# Patient Record
Sex: MALE | Race: WHITE | Employment: OTHER | URBAN - METROPOLITAN AREA
[De-identification: names, ages, dates, MRNs, and addresses within clinical notes are randomized per-mention and may not be internally consistent; named-entity substitution may affect disease eponyms.]

---

## 2023-04-10 LAB
ANION GAP IN SER/PLAS: 12 MMOL/L (ref 10–20)
CALCIUM (MG/DL) IN SER/PLAS: 8.5 MG/DL (ref 8.6–10.3)
CARBON DIOXIDE, TOTAL (MMOL/L) IN SER/PLAS: 28 MMOL/L (ref 21–32)
CHLORIDE (MMOL/L) IN SER/PLAS: 104 MMOL/L (ref 98–107)
CHOLESTEROL (MG/DL) IN SER/PLAS: 129 MG/DL (ref 0–199)
CHOLESTEROL IN HDL (MG/DL) IN SER/PLAS: 60.6 MG/DL
CHOLESTEROL/HDL RATIO: 2.1
CREATININE (MG/DL) IN SER/PLAS: 0.91 MG/DL (ref 0.5–1.3)
GFR MALE: 85 ML/MIN/1.73M2
GLUCOSE (MG/DL) IN SER/PLAS: 63 MG/DL (ref 74–99)
LDL: 57 MG/DL (ref 0–99)
POTASSIUM (MMOL/L) IN SER/PLAS: 4.5 MMOL/L (ref 3.5–5.3)
SODIUM (MMOL/L) IN SER/PLAS: 139 MMOL/L (ref 136–145)
THYROTROPIN (MIU/L) IN SER/PLAS BY DETECTION LIMIT <= 0.05 MIU/L: 7.02 MIU/L (ref 0.44–3.98)
THYROXINE (T4) FREE (NG/DL) IN SER/PLAS: 0.66 NG/DL (ref 0.61–1.12)
TRIGLYCERIDE (MG/DL) IN SER/PLAS: 58 MG/DL (ref 0–149)
UREA NITROGEN (MG/DL) IN SER/PLAS: 25 MG/DL (ref 6–23)
VLDL: 12 MG/DL (ref 0–40)

## 2023-04-12 ENCOUNTER — APPOINTMENT (OUTPATIENT)
Dept: PRIMARY CARE | Facility: CLINIC | Age: 78
End: 2023-04-12
Payer: MEDICARE

## 2023-04-13 ENCOUNTER — OFFICE VISIT (OUTPATIENT)
Dept: PRIMARY CARE | Facility: CLINIC | Age: 78
End: 2023-04-13
Payer: MEDICARE

## 2023-04-13 VITALS
HEART RATE: 56 BPM | DIASTOLIC BLOOD PRESSURE: 70 MMHG | TEMPERATURE: 97.7 F | SYSTOLIC BLOOD PRESSURE: 124 MMHG | OXYGEN SATURATION: 96 %

## 2023-04-13 DIAGNOSIS — E78.2 MIXED HYPERLIPIDEMIA: ICD-10-CM

## 2023-04-13 DIAGNOSIS — I10 ESSENTIAL HYPERTENSION: ICD-10-CM

## 2023-04-13 DIAGNOSIS — R32 URINARY INCONTINENCE, UNSPECIFIED TYPE: ICD-10-CM

## 2023-04-13 DIAGNOSIS — E55.9 VITAMIN D DEFICIENCY: ICD-10-CM

## 2023-04-13 DIAGNOSIS — R79.9 ELEVATED BLOOD UREA NITROGEN: ICD-10-CM

## 2023-04-13 DIAGNOSIS — E03.9 HYPOTHYROIDISM, UNSPECIFIED TYPE: ICD-10-CM

## 2023-04-13 DIAGNOSIS — F32.A DEPRESSIVE DISORDER: ICD-10-CM

## 2023-04-13 DIAGNOSIS — H57.89 REDNESS OF LEFT EYE: ICD-10-CM

## 2023-04-13 DIAGNOSIS — I77.9 CAROTID ARTERY DISEASE, UNSPECIFIED LATERALITY, UNSPECIFIED TYPE (CMS-HCC): Primary | ICD-10-CM

## 2023-04-13 DIAGNOSIS — I73.9 PERIPHERAL VASCULAR DISEASE, UNSPECIFIED (CMS-HCC): ICD-10-CM

## 2023-04-13 DIAGNOSIS — R00.1 BRADYCARDIA: ICD-10-CM

## 2023-04-13 PROBLEM — E53.8 COBALAMIN DEFICIENCY: Status: ACTIVE | Noted: 2019-08-13

## 2023-04-13 PROBLEM — E78.5 DYSLIPIDEMIA: Status: ACTIVE | Noted: 2023-04-13

## 2023-04-13 PROBLEM — R60.0 LOWER EXTREMITY EDEMA: Status: ACTIVE | Noted: 2023-04-13

## 2023-04-13 PROBLEM — I63.9 CEREBRAL INFARCTION (MULTI): Status: ACTIVE | Noted: 2023-04-13

## 2023-04-13 PROBLEM — E78.5 DYSLIPIDEMIA: Status: RESOLVED | Noted: 2023-04-13 | Resolved: 2023-04-13

## 2023-04-13 PROBLEM — I65.29 CAROTID ARTERY OCCLUSION: Status: ACTIVE | Noted: 2020-08-04

## 2023-04-13 PROCEDURE — 3074F SYST BP LT 130 MM HG: CPT | Performed by: FAMILY MEDICINE

## 2023-04-13 PROCEDURE — 99214 OFFICE O/P EST MOD 30 MIN: CPT | Performed by: FAMILY MEDICINE

## 2023-04-13 PROCEDURE — 3078F DIAST BP <80 MM HG: CPT | Performed by: FAMILY MEDICINE

## 2023-04-13 PROCEDURE — 1036F TOBACCO NON-USER: CPT | Performed by: FAMILY MEDICINE

## 2023-04-13 PROCEDURE — 1159F MED LIST DOCD IN RCRD: CPT | Performed by: FAMILY MEDICINE

## 2023-04-13 RX ORDER — CLOPIDOGREL BISULFATE 75 MG/1
75 TABLET ORAL DAILY
Qty: 90 TABLET | Refills: 1 | Status: ON HOLD | OUTPATIENT
Start: 2023-04-13 | End: 2023-10-10 | Stop reason: SDUPTHER

## 2023-04-13 RX ORDER — LANOLIN ALCOHOL/MO/W.PET/CERES
1000 CREAM (GRAM) TOPICAL DAILY
COMMUNITY

## 2023-04-13 RX ORDER — ERGOCALCIFEROL 1.25 MG/1
CAPSULE ORAL
COMMUNITY
Start: 2023-04-07 | End: 2023-04-13 | Stop reason: SDUPTHER

## 2023-04-13 RX ORDER — ATORVASTATIN CALCIUM 40 MG/1
40 TABLET, FILM COATED ORAL DAILY
Qty: 90 TABLET | Refills: 1 | Status: ON HOLD | OUTPATIENT
Start: 2023-04-13 | End: 2023-10-10 | Stop reason: SDUPTHER

## 2023-04-13 RX ORDER — SERTRALINE HYDROCHLORIDE 100 MG/1
TABLET, FILM COATED ORAL
COMMUNITY
Start: 2012-01-04 | End: 2023-04-13 | Stop reason: SDUPTHER

## 2023-04-13 RX ORDER — BACLOFEN 10 MG/1
TABLET ORAL
COMMUNITY
Start: 2023-04-07 | End: 2023-04-13 | Stop reason: ALTCHOICE

## 2023-04-13 RX ORDER — OXYBUTYNIN CHLORIDE 5 MG/1
TABLET ORAL
COMMUNITY
Start: 2009-07-28 | End: 2023-04-13 | Stop reason: SDUPTHER

## 2023-04-13 RX ORDER — ATORVASTATIN CALCIUM 40 MG/1
TABLET, FILM COATED ORAL
COMMUNITY
Start: 2014-10-28 | End: 2023-04-13 | Stop reason: SDUPTHER

## 2023-04-13 RX ORDER — ERYTHROMYCIN 5 MG/G
OINTMENT OPHTHALMIC
COMMUNITY
Start: 2022-03-29 | End: 2023-04-13 | Stop reason: WASHOUT

## 2023-04-13 RX ORDER — ERGOCALCIFEROL 1.25 MG/1
50000 CAPSULE ORAL
Qty: 12 CAPSULE | Refills: 1 | Status: SHIPPED | OUTPATIENT
Start: 2023-04-13 | End: 2023-10-12 | Stop reason: HOSPADM

## 2023-04-13 RX ORDER — LEVOTHYROXINE SODIUM 112 UG/1
TABLET ORAL
COMMUNITY
Start: 2020-02-04 | End: 2023-04-13 | Stop reason: SDUPTHER

## 2023-04-13 RX ORDER — MELATONIN 3 MG
CAPSULE ORAL
COMMUNITY
Start: 2018-03-06 | End: 2023-10-12 | Stop reason: HOSPADM

## 2023-04-13 RX ORDER — CLOPIDOGREL BISULFATE 75 MG/1
TABLET ORAL
COMMUNITY
Start: 2018-03-06 | End: 2023-04-13 | Stop reason: SDUPTHER

## 2023-04-13 RX ORDER — OXYBUTYNIN CHLORIDE 5 MG/1
5 TABLET ORAL 3 TIMES DAILY
Qty: 270 TABLET | Refills: 1 | Status: ON HOLD | OUTPATIENT
Start: 2023-04-13 | End: 2023-10-10 | Stop reason: SDUPTHER

## 2023-04-13 RX ORDER — LEVOTHYROXINE SODIUM 112 UG/1
TABLET ORAL
Qty: 100 TABLET | Refills: 1 | Status: ON HOLD | OUTPATIENT
Start: 2023-04-13 | End: 2023-10-10 | Stop reason: SDUPTHER

## 2023-04-13 RX ORDER — CYANOCOBALAMIN (VITAMIN B-12) 500 MCG
1 TABLET ORAL DAILY
COMMUNITY
Start: 2018-03-06

## 2023-04-13 RX ORDER — SERTRALINE HYDROCHLORIDE 100 MG/1
100 TABLET, FILM COATED ORAL DAILY
Qty: 90 TABLET | Refills: 1 | Status: ON HOLD | OUTPATIENT
Start: 2023-04-13 | End: 2023-10-10 | Stop reason: SDUPTHER

## 2023-04-13 ASSESSMENT — PATIENT HEALTH QUESTIONNAIRE - PHQ9
SUM OF ALL RESPONSES TO PHQ9 QUESTIONS 1 AND 2: 0
1. LITTLE INTEREST OR PLEASURE IN DOING THINGS: NOT AT ALL
2. FEELING DOWN, DEPRESSED OR HOPELESS: NOT AT ALL

## 2023-04-13 NOTE — PROGRESS NOTES
Assessment     ASSESSMENT/PLAN:      Problem List Items Addressed This Visit          Circulatory    Carotid artery disease (CMS/HCC) - Primary    Essential hypertension       Endocrine/Metabolic    Hypothyroidism    Relevant Medications    levothyroxine (Synthroid, Levoxyl) 112 mcg tablet    Other Relevant Orders    Tsh With Reflex To Free T4 If Abnormal       Other    Depressive disorder    Relevant Medications    sertraline (Zoloft) 100 mg tablet    Mixed hyperlipidemia    Relevant Medications    atorvastatin (Lipitor) 40 mg tablet    Other Relevant Orders    Lipid Panel     Other Visit Diagnoses       Peripheral vascular disease, unspecified (CMS/HCC)        Relevant Medications    clopidogrel (Plavix) 75 mg tablet    Elevated blood urea nitrogen        Relevant Orders    Comprehensive Metabolic Panel    Redness of left eye        Relevant Orders    Referral to Ophthalmology    Vitamin D deficiency        Relevant Medications    ergocalciferol (Vitamin D-2) 1.25 MG (20727 UT) capsule    Urinary incontinence, unspecified type        Relevant Medications    oxybutynin (Ditropan) 5 mg tablet            Patient Instructions:  Patient Instructions   Chronic conditions controlled  Encouraged to continue eating healthy and exercising daily   Medications were reviewed and refilled   Discussed the following  Hypothyroid: Take 1.5 tablet on Sunday and 1 tablets on the rest of the days   Follow up in 6 months        Signed by: Mohan Hernandez DO       FUTURE DIRECTION:   Review ophthalmology recommendations, AWV and next visit  Reviewed TSH  Consider EKG if still bradycardic    Subjective   SUBJECTIVE:     HPI : Patient is a 77 y.o. 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 Fisher  Cardiology: Dr. Ruiz    Review of Systems    Past Medical History:   Diagnosis Date    Personal history of other diseases of the digestive system     History of gastroesophageal reflux (GERD)    Personal history of other endocrine, nutritional and metabolic disease     History of hypercholesterolemia    Personal history of other mental and behavioral disorders     History of depression    Personal history of transient ischemic attack (TIA), and cerebral infarction without residual deficits     History of cerebrovascular accident        Past Surgical History:   Procedure Laterality Date    MR HEAD ANGIO WO IV CONTRAST  3/13/2023    MR HEAD ANGIO WO IV CONTRAST [facility]    MR NECK ANGIO WO IV CONTRAST  6/7/2022    MR NECK ANGIO WO IV CONTRAST 6/7/2022 [facility]    MR NECK ANGIO WO IV CONTRAST  3/13/2023    MR NECK ANGIO WO IV CONTRAST [facility] MRI    OTHER SURGICAL HISTORY  04/13/2022    Inguinal hernia repair        Current Outpatient Medications   Medication Instructions    atorvastatin (LIPITOR) 40 mg, oral, Daily    clopidogrel (PLAVIX) 75 mg, oral, Daily    cyanocobalamin (Vitamin B-12) 1,000 mcg tablet oral    ergocalciferol (VITAMIN D-2) 50,000 Units, oral, Weekly    folic acid 0.8 mg capsule oral    levothyroxine (Synthroid, Levoxyl) 112 mcg tablet Take 1.5 tablet on Sundays and 1 tablet all other days    melatonin 3 mg capsule     oxybutynin (DITROPAN) 5 mg, oral, 3 times daily    sertraline (ZOLOFT) 100 mg, oral, Daily        Allergies   Allergen Reactions    Hexadrol Rash        Social History     Socioeconomic History    Marital status:      Spouse name: Not on file    Number of children: Not on file    Years of education: Not on  file    Highest education level: Not on file   Occupational History    Not on file   Tobacco Use    Smoking status: Former     Packs/day: 1.00     Types: Cigarettes     Quit date:      Years since quittin.2    Smokeless tobacco: Never   Vaping Use    Vaping status: Not on file   Substance and Sexual Activity    Alcohol use: Never    Drug use: Never    Sexual activity: Not on file   Other Topics Concern    Not on file   Social History Narrative    Not on file     Social Determinants of Health     Financial Resource Strain: Not on file   Food Insecurity: Not on file   Transportation Needs: Not on file   Physical Activity: Not on file   Stress: Not on file   Social Connections: Not on file   Intimate Partner Violence: Not on file   Housing Stability: Not on file        Family History   Problem Relation Name Age of Onset    Heart attack Mother      Coronary artery disease Mother      Coronary artery disease Brother      Heart attack Brother      Diabetes Other          Objective     OBJECTIVE:     Vitals:    23 1200 23 1225   BP: 124/70    Pulse: 50 56   Temp: 36.5 °C (97.7 °F)    SpO2: 96%         Physical Exam  Constitutional:       Appearance: Normal appearance.   HENT:      Head: Normocephalic.   Eyes:      Conjunctiva/sclera:      Left eye: Left conjunctiva is injected.      Comments: Bottom lid of left eye with erythema    Pulmonary:      Effort: Pulmonary effort is normal.   Musculoskeletal:      Cervical back: Normal range of motion.   Neurological:      Mental Status: He is alert.   Psychiatric:         Mood and Affect: Mood normal.

## 2023-04-13 NOTE — PATIENT INSTRUCTIONS
Chronic conditions controlled  Encouraged to continue eating healthy and exercising daily   Medications were reviewed and refilled   Discussed the following  Hypothyroid: Take 1.5 tablet on Sunday and 1 tablets on the rest of the days   Follow up in 6 months

## 2023-08-21 DIAGNOSIS — E55.9 VITAMIN D DEFICIENCY: ICD-10-CM

## 2023-08-23 RX ORDER — ERGOCALCIFEROL 1.25 1/1
1 CAPSULE ORAL
Qty: 11 CAPSULE | Refills: 3 | OUTPATIENT
Start: 2023-08-23

## 2023-08-30 DIAGNOSIS — F32.A DEPRESSIVE DISORDER: ICD-10-CM

## 2023-08-30 DIAGNOSIS — E78.2 MIXED HYPERLIPIDEMIA: ICD-10-CM

## 2023-08-30 DIAGNOSIS — I73.9 PERIPHERAL VASCULAR DISEASE, UNSPECIFIED (CMS-HCC): ICD-10-CM

## 2023-08-30 DIAGNOSIS — E03.9 HYPOTHYROIDISM, UNSPECIFIED TYPE: ICD-10-CM

## 2023-08-30 DIAGNOSIS — R32 URINARY INCONTINENCE, UNSPECIFIED TYPE: ICD-10-CM

## 2023-08-31 RX ORDER — OXYBUTYNIN CHLORIDE 5 MG/1
5 TABLET ORAL 3 TIMES DAILY
Qty: 270 TABLET | Refills: 3 | OUTPATIENT
Start: 2023-08-31

## 2023-08-31 RX ORDER — CLOPIDOGREL BISULFATE 75 MG/1
75 TABLET ORAL DAILY
Qty: 90 TABLET | Refills: 3 | OUTPATIENT
Start: 2023-08-31

## 2023-08-31 RX ORDER — LEVOTHYROXINE SODIUM 112 UG/1
TABLET ORAL
Qty: 98 TABLET | Refills: 3 | OUTPATIENT
Start: 2023-08-31

## 2023-08-31 RX ORDER — SERTRALINE HYDROCHLORIDE 100 MG/1
100 TABLET, FILM COATED ORAL DAILY
Qty: 90 TABLET | Refills: 3 | OUTPATIENT
Start: 2023-08-31

## 2023-08-31 RX ORDER — ATORVASTATIN CALCIUM 40 MG/1
40 TABLET, FILM COATED ORAL DAILY
Qty: 90 TABLET | Refills: 3 | OUTPATIENT
Start: 2023-08-31

## 2023-10-06 ENCOUNTER — HOSPITAL ENCOUNTER (INPATIENT)
Facility: HOSPITAL | Age: 78
LOS: 5 days | Discharge: NURSING, REHAB OR LONG-TERM CARE | DRG: 480 | End: 2023-10-12
Attending: EMERGENCY MEDICINE | Admitting: INTERNAL MEDICINE
Payer: MEDICARE

## 2023-10-06 DIAGNOSIS — E55.9 VITAMIN D DEFICIENCY: ICD-10-CM

## 2023-10-06 DIAGNOSIS — I65.29 OCCLUSION OF CAROTID ARTERY, UNSPECIFIED LATERALITY: ICD-10-CM

## 2023-10-06 DIAGNOSIS — S72.002A CLOSED FRACTURE OF LEFT HIP, INITIAL ENCOUNTER (MULTI): Primary | ICD-10-CM

## 2023-10-06 DIAGNOSIS — R32 URINARY INCONTINENCE, UNSPECIFIED TYPE: ICD-10-CM

## 2023-10-06 DIAGNOSIS — S72.301K CLOSED FRACTURE OF SHAFT OF RIGHT FEMUR WITH NONUNION, UNSPECIFIED FRACTURE MORPHOLOGY, SUBSEQUENT ENCOUNTER: ICD-10-CM

## 2023-10-06 PROCEDURE — 96374 THER/PROPH/DIAG INJ IV PUSH: CPT

## 2023-10-06 PROCEDURE — 99285 EMERGENCY DEPT VISIT HI MDM: CPT | Mod: 25 | Performed by: EMERGENCY MEDICINE

## 2023-10-06 ASSESSMENT — PAIN DESCRIPTION - ORIENTATION: ORIENTATION: LEFT

## 2023-10-06 ASSESSMENT — COLUMBIA-SUICIDE SEVERITY RATING SCALE - C-SSRS
1. IN THE PAST MONTH, HAVE YOU WISHED YOU WERE DEAD OR WISHED YOU COULD GO TO SLEEP AND NOT WAKE UP?: NO
2. HAVE YOU ACTUALLY HAD ANY THOUGHTS OF KILLING YOURSELF?: NO
6. HAVE YOU EVER DONE ANYTHING, STARTED TO DO ANYTHING, OR PREPARED TO DO ANYTHING TO END YOUR LIFE?: NO

## 2023-10-06 ASSESSMENT — PAIN - FUNCTIONAL ASSESSMENT: PAIN_FUNCTIONAL_ASSESSMENT: 0-10

## 2023-10-06 ASSESSMENT — PAIN DESCRIPTION - PAIN TYPE: TYPE: ACUTE PAIN

## 2023-10-06 ASSESSMENT — PAIN SCALES - GENERAL: PAINLEVEL_OUTOF10: 5 - MODERATE PAIN

## 2023-10-06 ASSESSMENT — PAIN DESCRIPTION - LOCATION: LOCATION: HIP

## 2023-10-07 ENCOUNTER — ANESTHESIA EVENT (OUTPATIENT)
Dept: OPERATING ROOM | Facility: HOSPITAL | Age: 78
DRG: 480 | End: 2023-10-07
Payer: MEDICARE

## 2023-10-07 ENCOUNTER — APPOINTMENT (OUTPATIENT)
Dept: RADIOLOGY | Facility: HOSPITAL | Age: 78
DRG: 480 | End: 2023-10-07
Payer: MEDICARE

## 2023-10-07 ENCOUNTER — ANESTHESIA (OUTPATIENT)
Dept: OPERATING ROOM | Facility: HOSPITAL | Age: 78
DRG: 480 | End: 2023-10-07
Payer: MEDICARE

## 2023-10-07 PROBLEM — Z79.01 ANTICOAGULANT LONG-TERM USE: Status: ACTIVE | Noted: 2023-10-07

## 2023-10-07 PROBLEM — I25.10 CAD (CORONARY ARTERY DISEASE): Status: ACTIVE | Noted: 2023-10-07

## 2023-10-07 PROBLEM — S72.002A CLOSED FRACTURE OF LEFT HIP, INITIAL ENCOUNTER (MULTI): Status: ACTIVE | Noted: 2023-10-07

## 2023-10-07 PROBLEM — I63.9 CVA (CEREBRAL VASCULAR ACCIDENT) (MULTI): Status: ACTIVE | Noted: 2023-10-07

## 2023-10-07 LAB
ALBUMIN SERPL BCP-MCNC: 3.9 G/DL (ref 3.4–5)
ALP SERPL-CCNC: 47 U/L (ref 33–136)
ALT SERPL W P-5'-P-CCNC: 28 U/L (ref 10–52)
ANION GAP SERPL CALC-SCNC: 11 MMOL/L (ref 10–20)
AST SERPL W P-5'-P-CCNC: 26 U/L (ref 9–39)
BASOPHILS # BLD AUTO: 0.02 X10*3/UL (ref 0–0.1)
BASOPHILS NFR BLD AUTO: 0.2 %
BILIRUB DIRECT SERPL-MCNC: 0.2 MG/DL (ref 0–0.3)
BILIRUB SERPL-MCNC: 0.7 MG/DL (ref 0–1.2)
BUN SERPL-MCNC: 27 MG/DL (ref 6–23)
CALCIUM SERPL-MCNC: 9.3 MG/DL (ref 8.6–10.3)
CHLORIDE SERPL-SCNC: 107 MMOL/L (ref 98–107)
CO2 SERPL-SCNC: 26 MMOL/L (ref 21–32)
CREAT SERPL-MCNC: 0.88 MG/DL (ref 0.5–1.3)
EOSINOPHIL # BLD AUTO: 0.05 X10*3/UL (ref 0–0.4)
EOSINOPHIL NFR BLD AUTO: 0.4 %
ERYTHROCYTE [DISTWIDTH] IN BLOOD BY AUTOMATED COUNT: 13.2 % (ref 11.5–14.5)
GFR SERPL CREATININE-BSD FRML MDRD: 87 ML/MIN/1.73M*2
GLUCOSE SERPL-MCNC: 101 MG/DL (ref 74–99)
HCT VFR BLD AUTO: 40.1 % (ref 41–52)
HGB BLD-MCNC: 13.5 G/DL (ref 13.5–17.5)
IMM GRANULOCYTES # BLD AUTO: 0.04 X10*3/UL (ref 0–0.5)
IMM GRANULOCYTES NFR BLD AUTO: 0.3 % (ref 0–0.9)
INR PPP: 1.1 (ref 0.9–1.1)
LYMPHOCYTES # BLD AUTO: 1.13 X10*3/UL (ref 0.8–3)
LYMPHOCYTES NFR BLD AUTO: 9.2 %
MCH RBC QN AUTO: 29.9 PG (ref 26–34)
MCHC RBC AUTO-ENTMCNC: 33.7 G/DL (ref 32–36)
MCV RBC AUTO: 89 FL (ref 80–100)
MONOCYTES # BLD AUTO: 0.42 X10*3/UL (ref 0.05–0.8)
MONOCYTES NFR BLD AUTO: 3.4 %
NEUTROPHILS # BLD AUTO: 10.65 X10*3/UL (ref 1.6–5.5)
NEUTROPHILS NFR BLD AUTO: 86.5 %
NRBC BLD-RTO: 0 /100 WBCS (ref 0–0)
PLATELET # BLD AUTO: 194 X10*3/UL (ref 150–450)
PMV BLD AUTO: 10.5 FL (ref 7.5–11.5)
POTASSIUM SERPL-SCNC: 3.7 MMOL/L (ref 3.5–5.3)
PROT SERPL-MCNC: 7.1 G/DL (ref 6.4–8.2)
PROTHROMBIN TIME: 12.4 SECONDS (ref 9.8–12.8)
RBC # BLD AUTO: 4.52 X10*6/UL (ref 4.5–5.9)
SODIUM SERPL-SCNC: 140 MMOL/L (ref 136–145)
WBC # BLD AUTO: 12.3 X10*3/UL (ref 4.4–11.3)

## 2023-10-07 PROCEDURE — 2500000004 HC RX 250 GENERAL PHARMACY W/ HCPCS (ALT 636 FOR OP/ED): Performed by: ANESTHESIOLOGY

## 2023-10-07 PROCEDURE — 3600000009 HC OR TIME - EACH INCREMENTAL 1 MINUTE - PROCEDURE LEVEL FOUR: Performed by: STUDENT IN AN ORGANIZED HEALTH CARE EDUCATION/TRAINING PROGRAM

## 2023-10-07 PROCEDURE — 27235 TREAT THIGH FRACTURE: CPT | Performed by: STUDENT IN AN ORGANIZED HEALTH CARE EDUCATION/TRAINING PROGRAM

## 2023-10-07 PROCEDURE — 36415 COLL VENOUS BLD VENIPUNCTURE: CPT | Performed by: EMERGENCY MEDICINE

## 2023-10-07 PROCEDURE — 73552 X-RAY EXAM OF FEMUR 2/>: CPT | Mod: LEFT SIDE | Performed by: RADIOLOGY

## 2023-10-07 PROCEDURE — 73502 X-RAY EXAM HIP UNI 2-3 VIEWS: CPT | Mod: LT

## 2023-10-07 PROCEDURE — 99233 SBSQ HOSP IP/OBS HIGH 50: CPT | Performed by: HOSPITALIST

## 2023-10-07 PROCEDURE — 73502 X-RAY EXAM HIP UNI 2-3 VIEWS: CPT | Mod: LEFT SIDE | Performed by: RADIOLOGY

## 2023-10-07 PROCEDURE — 71045 X-RAY EXAM CHEST 1 VIEW: CPT | Mod: FY

## 2023-10-07 PROCEDURE — 85025 COMPLETE CBC W/AUTO DIFF WBC: CPT | Performed by: EMERGENCY MEDICINE

## 2023-10-07 PROCEDURE — 84075 ASSAY ALKALINE PHOSPHATASE: CPT | Performed by: EMERGENCY MEDICINE

## 2023-10-07 PROCEDURE — 2500000001 HC RX 250 WO HCPCS SELF ADMINISTERED DRUGS (ALT 637 FOR MEDICARE OP): Performed by: STUDENT IN AN ORGANIZED HEALTH CARE EDUCATION/TRAINING PROGRAM

## 2023-10-07 PROCEDURE — 96372 THER/PROPH/DIAG INJ SC/IM: CPT | Performed by: HOSPITALIST

## 2023-10-07 PROCEDURE — 3700000002 HC GENERAL ANESTHESIA TIME - EACH INCREMENTAL 1 MINUTE: Performed by: STUDENT IN AN ORGANIZED HEALTH CARE EDUCATION/TRAINING PROGRAM

## 2023-10-07 PROCEDURE — 2500000004 HC RX 250 GENERAL PHARMACY W/ HCPCS (ALT 636 FOR OP/ED): Performed by: HOSPITALIST

## 2023-10-07 PROCEDURE — 70450 CT HEAD/BRAIN W/O DYE: CPT | Performed by: SURGERY

## 2023-10-07 PROCEDURE — 2580000001 HC RX 258 IV SOLUTIONS: Performed by: ANESTHESIOLOGY

## 2023-10-07 PROCEDURE — 2500000004 HC RX 250 GENERAL PHARMACY W/ HCPCS (ALT 636 FOR OP/ED): Performed by: INTERNAL MEDICINE

## 2023-10-07 PROCEDURE — 73552 X-RAY EXAM OF FEMUR 2/>: CPT | Mod: LT,FY

## 2023-10-07 PROCEDURE — 7100000001 HC RECOVERY ROOM TIME - INITIAL BASE CHARGE: Performed by: STUDENT IN AN ORGANIZED HEALTH CARE EDUCATION/TRAINING PROGRAM

## 2023-10-07 PROCEDURE — 70450 CT HEAD/BRAIN W/O DYE: CPT | Mod: ME

## 2023-10-07 PROCEDURE — 99221 1ST HOSP IP/OBS SF/LOW 40: CPT | Performed by: STUDENT IN AN ORGANIZED HEALTH CARE EDUCATION/TRAINING PROGRAM

## 2023-10-07 PROCEDURE — 99223 1ST HOSP IP/OBS HIGH 75: CPT | Performed by: INTERNAL MEDICINE

## 2023-10-07 PROCEDURE — 2500000005 HC RX 250 GENERAL PHARMACY W/O HCPCS: Performed by: ANESTHESIOLOGY

## 2023-10-07 PROCEDURE — G1004 CDSM NDSC: HCPCS

## 2023-10-07 PROCEDURE — 72192 CT PELVIS W/O DYE: CPT | Mod: LEFT SIDE | Performed by: RADIOLOGY

## 2023-10-07 PROCEDURE — 71045 X-RAY EXAM CHEST 1 VIEW: CPT | Performed by: RADIOLOGY

## 2023-10-07 PROCEDURE — 82310 ASSAY OF CALCIUM: CPT | Performed by: EMERGENCY MEDICINE

## 2023-10-07 PROCEDURE — 2500000001 HC RX 250 WO HCPCS SELF ADMINISTERED DRUGS (ALT 637 FOR MEDICARE OP): Performed by: INTERNAL MEDICINE

## 2023-10-07 PROCEDURE — C1713 ANCHOR/SCREW BN/BN,TIS/BN: HCPCS | Performed by: STUDENT IN AN ORGANIZED HEALTH CARE EDUCATION/TRAINING PROGRAM

## 2023-10-07 PROCEDURE — 2580000001 HC RX 258 IV SOLUTIONS: Performed by: INTERNAL MEDICINE

## 2023-10-07 PROCEDURE — 2720000007 HC OR 272 NO HCPCS: Performed by: STUDENT IN AN ORGANIZED HEALTH CARE EDUCATION/TRAINING PROGRAM

## 2023-10-07 PROCEDURE — 1210000001 HC SEMI-PRIVATE ROOM DAILY

## 2023-10-07 PROCEDURE — 3700000001 HC GENERAL ANESTHESIA TIME - INITIAL BASE CHARGE: Performed by: STUDENT IN AN ORGANIZED HEALTH CARE EDUCATION/TRAINING PROGRAM

## 2023-10-07 PROCEDURE — 85610 PROTHROMBIN TIME: CPT | Performed by: EMERGENCY MEDICINE

## 2023-10-07 PROCEDURE — 73552 X-RAY EXAM OF FEMUR 2/>: CPT | Performed by: STUDENT IN AN ORGANIZED HEALTH CARE EDUCATION/TRAINING PROGRAM

## 2023-10-07 PROCEDURE — 0QS734Z REPOSITION LEFT UPPER FEMUR WITH INTERNAL FIXATION DEVICE, PERCUTANEOUS APPROACH: ICD-10-PCS | Performed by: STUDENT IN AN ORGANIZED HEALTH CARE EDUCATION/TRAINING PROGRAM

## 2023-10-07 PROCEDURE — 7100000002 HC RECOVERY ROOM TIME - EACH INCREMENTAL 1 MINUTE: Performed by: STUDENT IN AN ORGANIZED HEALTH CARE EDUCATION/TRAINING PROGRAM

## 2023-10-07 PROCEDURE — 73700 CT LOWER EXTREMITY W/O DYE: CPT | Mod: LT,MG

## 2023-10-07 PROCEDURE — 2780000003 HC OR 278 NO HCPCS: Performed by: STUDENT IN AN ORGANIZED HEALTH CARE EDUCATION/TRAINING PROGRAM

## 2023-10-07 PROCEDURE — 76000 FLUOROSCOPY <1 HR PHYS/QHP: CPT

## 2023-10-07 PROCEDURE — 3600000004 HC OR TIME - INITIAL BASE CHARGE - PROCEDURE LEVEL FOUR: Performed by: STUDENT IN AN ORGANIZED HEALTH CARE EDUCATION/TRAINING PROGRAM

## 2023-10-07 DEVICE — IMPLANTABLE DEVICE: Type: IMPLANTABLE DEVICE | Site: HIP | Status: FUNCTIONAL

## 2023-10-07 RX ORDER — LANOLIN ALCOHOL/MO/W.PET/CERES
1000 CREAM (GRAM) TOPICAL DAILY
Status: DISCONTINUED | OUTPATIENT
Start: 2023-10-07 | End: 2023-10-12 | Stop reason: HOSPADM

## 2023-10-07 RX ORDER — ONDANSETRON HYDROCHLORIDE 2 MG/ML
INJECTION, SOLUTION INTRAVENOUS AS NEEDED
Status: DISCONTINUED | OUTPATIENT
Start: 2023-10-07 | End: 2023-10-07

## 2023-10-07 RX ORDER — ACETAMINOPHEN 650 MG/1
650 SUPPOSITORY RECTAL EVERY 4 HOURS PRN
Status: DISCONTINUED | OUTPATIENT
Start: 2023-10-07 | End: 2023-10-12 | Stop reason: HOSPADM

## 2023-10-07 RX ORDER — ACETAMINOPHEN 325 MG/1
975 TABLET ORAL ONCE
Status: DISCONTINUED | OUTPATIENT
Start: 2023-10-07 | End: 2023-10-07 | Stop reason: HOSPADM

## 2023-10-07 RX ORDER — NORETHINDRONE AND ETHINYL ESTRADIOL 0.5-0.035
KIT ORAL AS NEEDED
Status: DISCONTINUED | OUTPATIENT
Start: 2023-10-07 | End: 2023-10-07

## 2023-10-07 RX ORDER — TALC
6 POWDER (GRAM) TOPICAL NIGHTLY PRN
Status: DISCONTINUED | OUTPATIENT
Start: 2023-10-07 | End: 2023-10-12 | Stop reason: HOSPADM

## 2023-10-07 RX ORDER — HYDRALAZINE HYDROCHLORIDE 20 MG/ML
5 INJECTION INTRAMUSCULAR; INTRAVENOUS EVERY 30 MIN PRN
Status: DISCONTINUED | OUTPATIENT
Start: 2023-10-07 | End: 2023-10-07 | Stop reason: HOSPADM

## 2023-10-07 RX ORDER — FENTANYL CITRATE 50 UG/ML
INJECTION, SOLUTION INTRAMUSCULAR; INTRAVENOUS AS NEEDED
Status: DISCONTINUED | OUTPATIENT
Start: 2023-10-07 | End: 2023-10-07

## 2023-10-07 RX ORDER — ONDANSETRON HYDROCHLORIDE 2 MG/ML
4 INJECTION, SOLUTION INTRAVENOUS EVERY 8 HOURS PRN
Status: DISCONTINUED | OUTPATIENT
Start: 2023-10-07 | End: 2023-10-12 | Stop reason: HOSPADM

## 2023-10-07 RX ORDER — TALC
3 POWDER (GRAM) TOPICAL DAILY
Status: DISCONTINUED | OUTPATIENT
Start: 2023-10-07 | End: 2023-10-12 | Stop reason: HOSPADM

## 2023-10-07 RX ORDER — CEFAZOLIN SODIUM 2 G/100ML
2 INJECTION, SOLUTION INTRAVENOUS EVERY 8 HOURS
Status: COMPLETED | OUTPATIENT
Start: 2023-10-07 | End: 2023-10-08

## 2023-10-07 RX ORDER — ONDANSETRON HYDROCHLORIDE 2 MG/ML
4 INJECTION, SOLUTION INTRAVENOUS ONCE AS NEEDED
Status: DISCONTINUED | OUTPATIENT
Start: 2023-10-07 | End: 2023-10-07 | Stop reason: HOSPADM

## 2023-10-07 RX ORDER — PHENYLEPHRINE 10 MG/250 ML(40 MCG/ML)IN 0.9 % SOD.CHLORIDE INTRAVENOUS
CONTINUOUS PRN
Status: DISCONTINUED | OUTPATIENT
Start: 2023-10-07 | End: 2023-10-07

## 2023-10-07 RX ORDER — OXYBUTYNIN CHLORIDE 5 MG/1
5 TABLET ORAL 3 TIMES DAILY
Status: DISCONTINUED | OUTPATIENT
Start: 2023-10-07 | End: 2023-10-12 | Stop reason: HOSPADM

## 2023-10-07 RX ORDER — PROPOFOL 10 MG/ML
INJECTION, EMULSION INTRAVENOUS AS NEEDED
Status: DISCONTINUED | OUTPATIENT
Start: 2023-10-07 | End: 2023-10-07

## 2023-10-07 RX ORDER — POLYETHYLENE GLYCOL 3350 17 G/17G
17 POWDER, FOR SOLUTION ORAL DAILY
Status: DISCONTINUED | OUTPATIENT
Start: 2023-10-07 | End: 2023-10-12 | Stop reason: HOSPADM

## 2023-10-07 RX ORDER — OXYCODONE HYDROCHLORIDE 10 MG/1
10 TABLET ORAL EVERY 4 HOURS PRN
Status: DISCONTINUED | OUTPATIENT
Start: 2023-10-07 | End: 2023-10-12 | Stop reason: HOSPADM

## 2023-10-07 RX ORDER — ONDANSETRON 4 MG/1
4 TABLET, ORALLY DISINTEGRATING ORAL EVERY 8 HOURS PRN
Status: DISCONTINUED | OUTPATIENT
Start: 2023-10-07 | End: 2023-10-12 | Stop reason: HOSPADM

## 2023-10-07 RX ORDER — HYDROCODONE BITARTRATE AND ACETAMINOPHEN 5; 325 MG/1; MG/1
1 TABLET ORAL EVERY 6 HOURS PRN
Status: DISCONTINUED | OUTPATIENT
Start: 2023-10-07 | End: 2023-10-07

## 2023-10-07 RX ORDER — MORPHINE SULFATE 2 MG/ML
2 INJECTION, SOLUTION INTRAMUSCULAR; INTRAVENOUS EVERY 5 MIN PRN
Status: DISCONTINUED | OUTPATIENT
Start: 2023-10-07 | End: 2023-10-07 | Stop reason: HOSPADM

## 2023-10-07 RX ORDER — LEVOTHYROXINE SODIUM 112 UG/1
112 TABLET ORAL
Status: DISCONTINUED | OUTPATIENT
Start: 2023-10-07 | End: 2023-10-12 | Stop reason: HOSPADM

## 2023-10-07 RX ORDER — ENOXAPARIN SODIUM 100 MG/ML
40 INJECTION SUBCUTANEOUS DAILY
Status: DISCONTINUED | OUTPATIENT
Start: 2023-10-07 | End: 2023-10-10

## 2023-10-07 RX ORDER — LABETALOL HYDROCHLORIDE 5 MG/ML
5 INJECTION, SOLUTION INTRAVENOUS ONCE AS NEEDED
Status: DISCONTINUED | OUTPATIENT
Start: 2023-10-07 | End: 2023-10-07 | Stop reason: HOSPADM

## 2023-10-07 RX ORDER — SODIUM CHLORIDE, SODIUM LACTATE, POTASSIUM CHLORIDE, CALCIUM CHLORIDE 600; 310; 30; 20 MG/100ML; MG/100ML; MG/100ML; MG/100ML
100 INJECTION, SOLUTION INTRAVENOUS CONTINUOUS
Status: DISCONTINUED | OUTPATIENT
Start: 2023-10-07 | End: 2023-10-07 | Stop reason: HOSPADM

## 2023-10-07 RX ORDER — SODIUM CHLORIDE, SODIUM LACTATE, POTASSIUM CHLORIDE, CALCIUM CHLORIDE 600; 310; 30; 20 MG/100ML; MG/100ML; MG/100ML; MG/100ML
50 INJECTION, SOLUTION INTRAVENOUS CONTINUOUS
Status: DISCONTINUED | OUTPATIENT
Start: 2023-10-07 | End: 2023-10-07 | Stop reason: HOSPADM

## 2023-10-07 RX ORDER — LIDOCAINE HYDROCHLORIDE 20 MG/ML
INJECTION, SOLUTION INFILTRATION; PERINEURAL AS NEEDED
Status: DISCONTINUED | OUTPATIENT
Start: 2023-10-07 | End: 2023-10-07

## 2023-10-07 RX ORDER — SODIUM CHLORIDE, SODIUM LACTATE, POTASSIUM CHLORIDE, CALCIUM CHLORIDE 600; 310; 30; 20 MG/100ML; MG/100ML; MG/100ML; MG/100ML
INJECTION, SOLUTION INTRAVENOUS CONTINUOUS PRN
Status: DISCONTINUED | OUTPATIENT
Start: 2023-10-07 | End: 2023-10-07

## 2023-10-07 RX ORDER — OXYCODONE HYDROCHLORIDE 5 MG/1
5 TABLET ORAL EVERY 4 HOURS PRN
Status: DISCONTINUED | OUTPATIENT
Start: 2023-10-07 | End: 2023-10-12 | Stop reason: HOSPADM

## 2023-10-07 RX ORDER — SERTRALINE HYDROCHLORIDE 100 MG/1
100 TABLET, FILM COATED ORAL DAILY
Status: DISCONTINUED | OUTPATIENT
Start: 2023-10-07 | End: 2023-10-12 | Stop reason: HOSPADM

## 2023-10-07 RX ORDER — ATORVASTATIN CALCIUM 40 MG/1
40 TABLET, FILM COATED ORAL DAILY
Status: DISCONTINUED | OUTPATIENT
Start: 2023-10-07 | End: 2023-10-12 | Stop reason: HOSPADM

## 2023-10-07 RX ORDER — OXYCODONE HYDROCHLORIDE 10 MG/1
10 TABLET ORAL EVERY 4 HOURS PRN
Status: DISCONTINUED | OUTPATIENT
Start: 2023-10-07 | End: 2023-10-07

## 2023-10-07 RX ORDER — ACETAMINOPHEN 160 MG/5ML
650 SOLUTION ORAL EVERY 4 HOURS PRN
Status: DISCONTINUED | OUTPATIENT
Start: 2023-10-07 | End: 2023-10-12 | Stop reason: HOSPADM

## 2023-10-07 RX ORDER — OXYCODONE HYDROCHLORIDE 5 MG/1
5 TABLET ORAL EVERY 4 HOURS PRN
Status: DISCONTINUED | OUTPATIENT
Start: 2023-10-07 | End: 2023-10-07 | Stop reason: HOSPADM

## 2023-10-07 RX ORDER — PHENYLEPHRINE HCL IN 0.9% NACL 0.4MG/10ML
SYRINGE (ML) INTRAVENOUS AS NEEDED
Status: DISCONTINUED | OUTPATIENT
Start: 2023-10-07 | End: 2023-10-07

## 2023-10-07 RX ORDER — MORPHINE SULFATE 4 MG/ML
4 INJECTION INTRAVENOUS EVERY 5 MIN PRN
Status: DISCONTINUED | OUTPATIENT
Start: 2023-10-07 | End: 2023-10-07 | Stop reason: HOSPADM

## 2023-10-07 RX ORDER — ALBUTEROL SULFATE 0.83 MG/ML
2.5 SOLUTION RESPIRATORY (INHALATION) ONCE AS NEEDED
Status: DISCONTINUED | OUTPATIENT
Start: 2023-10-07 | End: 2023-10-07 | Stop reason: HOSPADM

## 2023-10-07 RX ORDER — SODIUM CHLORIDE, SODIUM LACTATE, POTASSIUM CHLORIDE, CALCIUM CHLORIDE 600; 310; 30; 20 MG/100ML; MG/100ML; MG/100ML; MG/100ML
100 INJECTION, SOLUTION INTRAVENOUS CONTINUOUS
Status: DISCONTINUED | OUTPATIENT
Start: 2023-10-07 | End: 2023-10-10

## 2023-10-07 RX ORDER — ACETAMINOPHEN 325 MG/1
650 TABLET ORAL EVERY 4 HOURS PRN
Status: DISCONTINUED | OUTPATIENT
Start: 2023-10-07 | End: 2023-10-12 | Stop reason: HOSPADM

## 2023-10-07 RX ORDER — CEFAZOLIN SODIUM 2 G/100ML
INJECTION, SOLUTION INTRAVENOUS AS NEEDED
Status: DISCONTINUED | OUTPATIENT
Start: 2023-10-07 | End: 2023-10-07

## 2023-10-07 RX ADMIN — ONDANSETRON 4 MG: 2 INJECTION INTRAMUSCULAR; INTRAVENOUS at 15:28

## 2023-10-07 RX ADMIN — PROPOFOL 50 MG: 10 INJECTION, EMULSION INTRAVENOUS at 14:56

## 2023-10-07 RX ADMIN — Medication 100 MCG: at 15:00

## 2023-10-07 RX ADMIN — PROPOFOL 100 MG: 10 INJECTION, EMULSION INTRAVENOUS at 14:16

## 2023-10-07 RX ADMIN — MORPHINE SULFATE 4 MG: 4 INJECTION INTRAVENOUS at 16:16

## 2023-10-07 RX ADMIN — SODIUM CHLORIDE, POTASSIUM CHLORIDE, SODIUM LACTATE AND CALCIUM CHLORIDE: 600; 310; 30; 20 INJECTION, SOLUTION INTRAVENOUS at 15:00

## 2023-10-07 RX ADMIN — LIDOCAINE HYDROCHLORIDE 70 MG: 20 INJECTION, SOLUTION INFILTRATION; PERINEURAL at 14:16

## 2023-10-07 RX ADMIN — OXYCODONE HYDROCHLORIDE 10 MG: 10 TABLET ORAL at 20:55

## 2023-10-07 RX ADMIN — SODIUM CHLORIDE, POTASSIUM CHLORIDE, SODIUM LACTATE AND CALCIUM CHLORIDE: 600; 310; 30; 20 INJECTION, SOLUTION INTRAVENOUS at 14:10

## 2023-10-07 RX ADMIN — CEFAZOLIN SODIUM 2 G: 2 INJECTION, SOLUTION INTRAVENOUS at 14:26

## 2023-10-07 RX ADMIN — EPHEDRINE SULFATE 10 MG: 50 INJECTION, SOLUTION INTRAVENOUS at 14:59

## 2023-10-07 RX ADMIN — EPHEDRINE SULFATE 15 MG: 50 INJECTION, SOLUTION INTRAVENOUS at 14:22

## 2023-10-07 RX ADMIN — CEFAZOLIN SODIUM 2 G: 2 INJECTION, SOLUTION INTRAVENOUS at 23:15

## 2023-10-07 RX ADMIN — ATORVASTATIN CALCIUM 40 MG: 40 TABLET, FILM COATED ORAL at 20:55

## 2023-10-07 RX ADMIN — SODIUM CHLORIDE, POTASSIUM CHLORIDE, SODIUM LACTATE AND CALCIUM CHLORIDE 100 ML/HR: 600; 310; 30; 20 INJECTION, SOLUTION INTRAVENOUS at 18:50

## 2023-10-07 RX ADMIN — HYDROMORPHONE HYDROCHLORIDE 0.2 MG: 0.5 INJECTION, SOLUTION INTRAMUSCULAR; INTRAVENOUS; SUBCUTANEOUS at 02:28

## 2023-10-07 RX ADMIN — FENTANYL CITRATE 50 MCG: 50 INJECTION, SOLUTION INTRAMUSCULAR; INTRAVENOUS at 14:26

## 2023-10-07 RX ADMIN — Medication 100 MCG: at 14:25

## 2023-10-07 RX ADMIN — Medication 3 MG: at 20:55

## 2023-10-07 RX ADMIN — PROPOFOL 50 MG: 10 INJECTION, EMULSION INTRAVENOUS at 14:53

## 2023-10-07 RX ADMIN — PROPOFOL 50 MG: 10 INJECTION, EMULSION INTRAVENOUS at 14:21

## 2023-10-07 RX ADMIN — PROPOFOL 50 MG: 10 INJECTION, EMULSION INTRAVENOUS at 14:24

## 2023-10-07 RX ADMIN — FENTANYL CITRATE 50 MCG: 50 INJECTION, SOLUTION INTRAMUSCULAR; INTRAVENOUS at 15:07

## 2023-10-07 RX ADMIN — Medication 200 MCG: at 14:27

## 2023-10-07 RX ADMIN — OXYBUTYNIN CHLORIDE 5 MG: 5 TABLET ORAL at 20:55

## 2023-10-07 RX ADMIN — ENOXAPARIN SODIUM 40 MG: 40 INJECTION SUBCUTANEOUS at 18:50

## 2023-10-07 RX ADMIN — Medication 200 MCG: at 14:31

## 2023-10-07 SDOH — SOCIAL STABILITY: SOCIAL INSECURITY: DO YOU FEEL ANYONE HAS EXPLOITED OR TAKEN ADVANTAGE OF YOU FINANCIALLY OR OF YOUR PERSONAL PROPERTY?: NO

## 2023-10-07 SDOH — SOCIAL STABILITY: SOCIAL INSECURITY: DO YOU FEEL UNSAFE GOING BACK TO THE PLACE WHERE YOU ARE LIVING?: NO

## 2023-10-07 SDOH — SOCIAL STABILITY: SOCIAL INSECURITY: HAVE YOU HAD THOUGHTS OF HARMING ANYONE ELSE?: NO

## 2023-10-07 SDOH — SOCIAL STABILITY: SOCIAL INSECURITY: ARE THERE ANY APPARENT SIGNS OF INJURIES/BEHAVIORS THAT COULD BE RELATED TO ABUSE/NEGLECT?: NO

## 2023-10-07 SDOH — SOCIAL STABILITY: SOCIAL INSECURITY: HAS ANYONE EVER THREATENED TO HURT YOUR FAMILY OR YOUR PETS?: NO

## 2023-10-07 SDOH — SOCIAL STABILITY: SOCIAL INSECURITY: ABUSE: ADULT

## 2023-10-07 SDOH — HEALTH STABILITY: MENTAL HEALTH: CURRENT SMOKER: 0

## 2023-10-07 SDOH — SOCIAL STABILITY: SOCIAL INSECURITY: DOES ANYONE TRY TO KEEP YOU FROM HAVING/CONTACTING OTHER FRIENDS OR DOING THINGS OUTSIDE YOUR HOME?: NO

## 2023-10-07 SDOH — SOCIAL STABILITY: SOCIAL INSECURITY: ARE YOU OR HAVE YOU BEEN THREATENED OR ABUSED PHYSICALLY, EMOTIONALLY, OR SEXUALLY BY ANYONE?: NO

## 2023-10-07 ASSESSMENT — ACTIVITIES OF DAILY LIVING (ADL)
BATHING: NEEDS ASSISTANCE
TOILETING: NEEDS ASSISTANCE
PATIENT'S MEMORY ADEQUATE TO SAFELY COMPLETE DAILY ACTIVITIES?: YES
FEEDING YOURSELF: NEEDS ASSISTANCE
GROOMING: NEEDS ASSISTANCE
HEARING - LEFT EAR: FUNCTIONAL
TOILETING: NEEDS ASSISTANCE
FEEDING YOURSELF: NEEDS ASSISTANCE
HEARING - RIGHT EAR: FUNCTIONAL
HEARING - LEFT EAR: FUNCTIONAL
DRESSING YOURSELF: NEEDS ASSISTANCE
BATHING: NEEDS ASSISTANCE
WALKS IN HOME: INDEPENDENT
GROOMING: NEEDS ASSISTANCE
WALKS IN HOME: INDEPENDENT
JUDGMENT_ADEQUATE_SAFELY_COMPLETE_DAILY_ACTIVITIES: YES
ADEQUATE_TO_COMPLETE_ADL: YES
HEARING - RIGHT EAR: FUNCTIONAL
DRESSING YOURSELF: NEEDS ASSISTANCE

## 2023-10-07 ASSESSMENT — COGNITIVE AND FUNCTIONAL STATUS - GENERAL
DRESSING REGULAR LOWER BODY CLOTHING: A LOT
DAILY ACTIVITIY SCORE: 15
DRESSING REGULAR LOWER BODY CLOTHING: TOTAL
MOBILITY SCORE: 11
DRESSING REGULAR UPPER BODY CLOTHING: A LITTLE
PERSONAL GROOMING: A LITTLE
STANDING UP FROM CHAIR USING ARMS: A LOT
HELP NEEDED FOR BATHING: A LITTLE
MOBILITY SCORE: 11
EATING MEALS: A LITTLE
STANDING UP FROM CHAIR USING ARMS: TOTAL
DRESSING REGULAR UPPER BODY CLOTHING: A LOT
PATIENT BASELINE BEDBOUND: NO
TOILETING: TOTAL
TURNING FROM BACK TO SIDE WHILE IN FLAT BAD: A LOT
HELP NEEDED FOR BATHING: A LOT
MOVING FROM LYING ON BACK TO SITTING ON SIDE OF FLAT BED WITH BEDRAILS: A LITTLE
WALKING IN HOSPITAL ROOM: TOTAL
WALKING IN HOSPITAL ROOM: TOTAL
MOVING FROM LYING ON BACK TO SITTING ON SIDE OF FLAT BED WITH BEDRAILS: A LITTLE
TOILETING: TOTAL
CLIMB 3 TO 5 STEPS WITH RAILING: TOTAL
EATING MEALS: A LOT
CLIMB 3 TO 5 STEPS WITH RAILING: TOTAL
TURNING FROM BACK TO SIDE WHILE IN FLAT BAD: A LITTLE
PERSONAL GROOMING: A LOT
DAILY ACTIVITIY SCORE: 10
MOVING TO AND FROM BED TO CHAIR: A LOT
MOVING TO AND FROM BED TO CHAIR: A LOT

## 2023-10-07 ASSESSMENT — PAIN SCALES - PAIN ASSESSMENT IN ADVANCED DEMENTIA (PAINAD)
NEGVOCALIZATION: OCCASIONAL MOAN/GROAN, LOW SPEECH, NEGATIVE/DISAPPROVING QUALITY
BODYLANGUAGE: RIGID, FISTS CLENCHED, KNEES UP, PUSHING/PULLING AWAY, STRIKES OUT
TOTALSCORE: NORMAL
TOTALSCORE: 1
CONSOLABILITY: DISTRACTED OR REASSURED BY VOICE/TOUCH
BODYLANGUAGE: RELAXED
CONSOLABILITY: DISTRACTED OR REASSURED BY VOICE/TOUCH
CONSOLABILITY: NO NEED TO CONSOLE
FACIALEXPRESSION: SMILING OR INEXPRESSIVE
NEGVOCALIZATION: OCCASIONAL MOAN/GROAN, LOW SPEECH, NEGATIVE/DISAPPROVING QUALITY
BODYLANGUAGE: RELAXED
TOTALSCORE: NORMAL
TOTALSCORE: 6
FACIALEXPRESSION: FACIAL GRIMACING
TOTALSCORE: 1
TOTALSCORE: NORMAL
FACIALEXPRESSION: SMILING OR INEXPRESSIVE
TOTALSCORE: NORMAL
FACIALEXPRESSION: SMILING OR INEXPRESSIVE
TOTALSCORE: 0
TOTALSCORE: 6
BREATHING: NORMAL
FACIALEXPRESSION: FACIAL GRIMACING
BODYLANGUAGE: RELAXED
BODYLANGUAGE: RIGID, FISTS CLENCHED, KNEES UP, PUSHING/PULLING AWAY, STRIKES OUT
CONSOLABILITY: DISTRACTED OR REASSURED BY VOICE/TOUCH
CONSOLABILITY: DISTRACTED OR REASSURED BY VOICE/TOUCH

## 2023-10-07 ASSESSMENT — PAIN SCALES - GENERAL
PAINLEVEL_OUTOF10: 6
PAINLEVEL_OUTOF10: 0 - NO PAIN
PAINLEVEL_OUTOF10: 8
PAINLEVEL_OUTOF10: 0 - NO PAIN
PAINLEVEL_OUTOF10: 0 - NO PAIN
PAINLEVEL_OUTOF10: 6
PAINLEVEL_OUTOF10: 1
PAINLEVEL_OUTOF10: 6
PAINLEVEL_OUTOF10: 1
PAINLEVEL_OUTOF10: 1
PAIN_LEVEL: 7
PAINLEVEL_OUTOF10: 0 - NO PAIN

## 2023-10-07 ASSESSMENT — LIFESTYLE VARIABLES
HOW OFTEN DO YOU HAVE 6 OR MORE DRINKS ON ONE OCCASION: NEVER
HOW OFTEN DO YOU HAVE A DRINK CONTAINING ALCOHOL: NEVER
PRESCIPTION_ABUSE_PAST_12_MONTHS: NO
AUDIT-C TOTAL SCORE: 0
AUDIT-C TOTAL SCORE: 0
SKIP TO QUESTIONS 9-10: 1
HOW MANY STANDARD DRINKS CONTAINING ALCOHOL DO YOU HAVE ON A TYPICAL DAY: PATIENT DOES NOT DRINK
SUBSTANCE_ABUSE_PAST_12_MONTHS: NO

## 2023-10-07 ASSESSMENT — PAIN - FUNCTIONAL ASSESSMENT
PAIN_FUNCTIONAL_ASSESSMENT: 0-10
PAIN_FUNCTIONAL_ASSESSMENT: PAINAD (PAIN ASSESSMENT IN ADVANCED DEMENTIA SCALE)
PAIN_FUNCTIONAL_ASSESSMENT: 0-10
PAIN_FUNCTIONAL_ASSESSMENT: PAINAD (PAIN ASSESSMENT IN ADVANCED DEMENTIA SCALE)
PAIN_FUNCTIONAL_ASSESSMENT: WONG-BAKER FACES

## 2023-10-07 ASSESSMENT — PATIENT HEALTH QUESTIONNAIRE - PHQ9
1. LITTLE INTEREST OR PLEASURE IN DOING THINGS: NOT AT ALL
2. FEELING DOWN, DEPRESSED OR HOPELESS: NOT AT ALL
SUM OF ALL RESPONSES TO PHQ9 QUESTIONS 1 & 2: 0

## 2023-10-07 ASSESSMENT — PAIN DESCRIPTION - DESCRIPTORS
DESCRIPTORS: SHARP
DESCRIPTORS: ACHING;SORE;SHOOTING

## 2023-10-07 NOTE — ANESTHESIA PROCEDURE NOTES
Airway  Date/Time: 10/7/2023 2:25 PM  Urgency: elective    Airway not difficult    Staffing  Performed: CRNA and attending   Authorized by: Gulshan Ray MD    Performed by: Gulshan Ray MD  Patient location during procedure: OR    Indications and Patient Condition  Indications for airway management: anesthesia  Spontaneous ventilation: present  Sedation level: deep  Preoxygenated: yes  Patient position: sniffing  Mask difficulty assessment: 0 - not attempted    Final Airway Details  Final airway type: supraglottic airway      Successful airway: Supraglottic airway: iGel.  Size 4     Number of attempts at approach: 1

## 2023-10-07 NOTE — CONSULTS
Reason For Consult  Left hip fracture a left minimally displaced valgus impacted femoral neck fracture.    History Of Present Illness  Saleem Alba is a 78 y.o. male presenting with a left valgus impacted femoral neck fracture.  Patient fell yesterday onto the left side.  He sustained immediate pain to the left hip.  X-ray and CT confirms the fracture.  At baseline he does have some motor and sensory deficits to the left side due to previous stroke.  He ambulates with a walker at baseline.     Past Medical History  He has a past medical history of Personal history of other diseases of the digestive system, Personal history of other endocrine, nutritional and metabolic disease, Personal history of other mental and behavioral disorders, and Personal history of transient ischemic attack (TIA), and cerebral infarction without residual deficits.    Surgical History  He has a past surgical history that includes Other surgical history (04/13/2022); MR angio neck wo IV contrast (6/7/2022); MR angio neck wo IV contrast (3/13/2023); and MR angio head wo IV contrast (3/13/2023).     Social History  He reports that he quit smoking about 16 years ago. His smoking use included cigarettes. He smoked an average of 1 pack per day. He has never used smokeless tobacco. He reports that he does not drink alcohol and does not use drugs.    Family History  Family History   Problem Relation Name Age of Onset    Heart attack Mother      Coronary artery disease Mother      Coronary artery disease Brother      Heart attack Brother      Diabetes Other          Allergies  Hexadrol    Physical Exam  Is an elderly male resting in bed.  He has tenderness palpation over the left lateral hip.  He is able to plantarflex and dorsiflex the left foot with 2/5 strength due to baseline function status post a previous stroke.  He is sensate to light touch in the sural, saphenous, DP, SP, tibial distributions.  His foot is warm and well-perfused.     Last  "Recorded Vitals  Blood pressure 115/70, pulse 73, temperature 36.9 °C (98.5 °F), temperature source Temporal, resp. rate 20, height 1.753 m (5' 9\"), weight 72.6 kg (160 lb), SpO2 90 %.    Relevant Results      Scheduled medications     Continuous medications     PRN medications  PRN medications: HYDROcodone-acetaminophen, HYDROmorphone  Results for orders placed or performed during the hospital encounter of 10/06/23 (from the past 24 hour(s))   CBC and Auto Differential   Result Value Ref Range    WBC 12.3 (H) 4.4 - 11.3 x10*3/uL    nRBC 0.0 0.0 - 0.0 /100 WBCs    RBC 4.52 4.50 - 5.90 x10*6/uL    Hemoglobin 13.5 13.5 - 17.5 g/dL    Hematocrit 40.1 (L) 41.0 - 52.0 %    MCV 89 80 - 100 fL    MCH 29.9 26.0 - 34.0 pg    MCHC 33.7 32.0 - 36.0 g/dL    RDW 13.2 11.5 - 14.5 %    Platelets 194 150 - 450 x10*3/uL    MPV 10.5 7.5 - 11.5 fL    Neutrophils % 86.5 40.0 - 80.0 %    Immature Granulocytes %, Automated 0.3 0.0 - 0.9 %    Lymphocytes % 9.2 13.0 - 44.0 %    Monocytes % 3.4 2.0 - 10.0 %    Eosinophils % 0.4 0.0 - 6.0 %    Basophils % 0.2 0.0 - 2.0 %    Neutrophils Absolute 10.65 (H) 1.60 - 5.50 x10*3/uL    Immature Granulocytes Absolute, Automated 0.04 0.00 - 0.50 x10*3/uL    Lymphocytes Absolute 1.13 0.80 - 3.00 x10*3/uL    Monocytes Absolute 0.42 0.05 - 0.80 x10*3/uL    Eosinophils Absolute 0.05 0.00 - 0.40 x10*3/uL    Basophils Absolute 0.02 0.00 - 0.10 x10*3/uL   Basic metabolic panel   Result Value Ref Range    Glucose 101 (H) 74 - 99 mg/dL    Sodium 140 136 - 145 mmol/L    Potassium 3.7 3.5 - 5.3 mmol/L    Chloride 107 98 - 107 mmol/L    Bicarbonate 26 21 - 32 mmol/L    Anion Gap 11 10 - 20 mmol/L    Urea Nitrogen 27 (H) 6 - 23 mg/dL    Creatinine 0.88 0.50 - 1.30 mg/dL    eGFR 87 >60 mL/min/1.73m*2    Calcium 9.3 8.6 - 10.3 mg/dL   Hepatic function panel   Result Value Ref Range    Albumin 3.9 3.4 - 5.0 g/dL    Bilirubin, Total 0.7 0.0 - 1.2 mg/dL    Bilirubin, Direct 0.2 0.0 - 0.3 mg/dL    Alkaline Phosphatase " 47 33 - 136 U/L    ALT 28 10 - 52 U/L    AST 26 9 - 39 U/L    Total Protein 7.1 6.4 - 8.2 g/dL   Protime-INR   Result Value Ref Range    Protime 12.4 9.8 - 12.8 seconds    INR 1.1 0.9 - 1.1        Assessment/Plan   This is an 78-year-old male with a valgus impacted minimally displaced left femoral neck fracture.  Operative and nonoperative management options were discussed with the patient and their family.  We have elected to pursue operative management of the CRPP.    I explained to the patient the risk of surgery which includes but are not limited to blood loss, nerve damage, infection, persistent pain, loss of function, hardware failure, loss of fixation, persistent pain, blood clot, and death.  They are in understanding of these risks and agreed to surgical procedure to be performed today.    Donnie Meek MD MPH

## 2023-10-07 NOTE — ED PROVIDER NOTES
HPI   Chief Complaint   Patient presents with    Fall     Mechanical fall on thinners, no loc and didn't hit head       HPI: []  78-year-old white male with a history of hypertension, CVA with residual left-sided weakness on Plavix comes in with mechanical fall.  He states he tripped and fell hitting his left hip on the floor.  Hit his head also no LOC.  No neck pain.  He denies any chest pain pressure heaviness fever chills nausea vomit diarrhea cough congestion incontinence seizures syncope or near syncope comes in with left hip pain.    Past history: Hypertension, CVA, dyslipidemia  Social: Denies current tobacco alcohol drug abuse.  REVIEW OF SYSTEMS:    GENERAL.: No weight loss, fatigue, anorexia, insomnia, fever.    EYES: No vision loss, double vision, drainage, eye pain.    ENT: No pharyngitis, dry mouth.    CARDIOPULMONARY: No chest pain, palpitations, syncope, near syncope. No shortness of breath, cough, hemoptysis.    GI: No abdominal pain, change in bowel habits, melena, hematemesis, hematochezia, nausea, vomiting, diarrhea.    : No discharge, dysuria, frequency, urgency, hematuria.    MS: No limb pain, positive for left hip pain, no, joint swelling.    SKIN: No rashes.    PSYCH: No depression, anxiety, suicidality, homicidality.    Review of systems is otherwise negative unless stated above or in history of present illness.  Social history, family history, allergies reviewed.  PHYSICAL EXAM:    GENERAL: Vitals noted, no distress. Alert and oriented  x 3. Non-toxic.      EENT: TMs clear. Posterior oropharynx unremarkable. No meningismus. No LAD.  Negative hemotympanum negative wray sign negative mastoid tenderness  Eyes: Pupils equal round and react light accommodation EOMs are intact  NECK: Supple. Nontender. No midline tenderness.  Negative midline C-spine tenderness    CARDIAC: Regular, rate, rhythm. No murmurs rubs or gallops. No JVD    PULMONARY: Lungs clear bilaterally with good aeration. No  wheezes rales or rhonchi. No respiratory distress.  No tachypnea stridor or retractions able to speak in full sentences    ABDOMEN: Soft, nonsurgical. Nontender. No peritoneal signs. Normoactive bowel sounds. No pulsatile masses.  Negative CVA tenderness    EXTREMITIES: No peripheral edema. Negative Homans bilaterally, no cords.  2+ bounding possible perfused  Musculoskeletal: Patient has no midline C, T, L-spine tenderness.  Pelvis stable.  Left hip has no deformity but range of motion limited due to pain left knee has no deformity good range of motion left ankle has some swelling of the foot which is chronic.  SKIN: No rash. Intact.     NEURO: Has residual left-sided weakness from previous CVA no new focal neurodeficits.    MEDICAL DECISION MAKING:  EKG on my interpretation shows normal sinus rhythm normal axis rate mid 80s with no acute ischemic changes.    Labs including CBC with differential chemistry liver functions coags urinalysis are pending.  Chest x-ray pending.  CT head negative.  X-ray of the hip and pelvis did confirm subcapital left femoral neck fracture.    Treatment in ED: IV established, given IV fluids.    Consult: Discussed with Dr. Lomax [facility] and I will discuss with orthopedics.    Impression: #1 mechanical fall, #2 hip fracture    Plan/MDM: Elderly 78-year-old male on Plavix comes in with a mechanical fall.  Low suspicion for TIA or stroke systemic infection STEMI or NSTEMI.,  Imaging concerning for hip fracture patient will be hospitalized for further care.                              Brittany Coma Scale Score: 15                  Patient History   Past Medical History:   Diagnosis Date    Personal history of other diseases of the digestive system     History of gastroesophageal reflux (GERD)    Personal history of other endocrine, nutritional and metabolic disease     History of hypercholesterolemia    Personal history of other mental and behavioral disorders     History of depression     Personal history of transient ischemic attack (TIA), and cerebral infarction without residual deficits     History of cerebrovascular accident     Past Surgical History:   Procedure Laterality Date    MR HEAD ANGIO WO IV CONTRAST  3/13/2023    MR HEAD ANGIO WO IV CONTRAST [facility] MRI    MR NECK ANGIO WO IV CONTRAST  2022    MR NECK ANGIO WO IV CONTRAST 2022 [facility]    MR NECK ANGIO WO IV CONTRAST  3/13/2023    MR NECK ANGIO WO IV CONTRAST [facility] MRI    OTHER SURGICAL HISTORY  2022    Inguinal hernia repair     Family History   Problem Relation Name Age of Onset    Heart attack Mother      Coronary artery disease Mother      Coronary artery disease Brother      Heart attack Brother      Diabetes Other       Social History     Tobacco Use    Smoking status: Former     Packs/day: 1     Types: Cigarettes     Quit date:      Years since quittin.7    Smokeless tobacco: Never   Substance Use Topics    Alcohol use: Never    Drug use: Never       Physical Exam   ED Triage Vitals [10/06/23 2336]   Temp Heart Rate Resp BP   -- 55 22 137/55      SpO2 Temp src Heart Rate Source Patient Position   96 % -- Monitor --      BP Location FiO2 (%)     -- --       Physical Exam    ED Course & MDM   ED Course as of 23 2307   Sat Oct 07, 2023   0142 Patient's head CT is negative x-ray of the hip and pelvis I do show impacted left femoral subcapital fracture.,  I have ordered preop labs discussed with the hospitalist I will consult orthopedic surgery and patient will be hospitalized. [MT]      ED Course User Index  [MT] Lucy Seo MD         Diagnoses as of 23   Closed fracture of left hip, initial encounter (CMS/[facility])   Occlusion of carotid artery, unspecified laterality   Closed fracture of shaft of right femur with nonunion, unspecified fracture morphology, subsequent encounter       Medical Decision Making      Procedure  Procedures     Lucy Seo MD  10/07/23 0144        Lucy Seo MD  11/26/23 1838

## 2023-10-07 NOTE — BRIEF OP NOTE
Date: 10/7/2023  OR Location: [facility] OR    Name: Saleem Alba, : 1945, Age: 78 y.o., MRN: [id number], Sex: male    Diagnosis  L minimally displaced, valgus impacted femoral neck fracture    Procedures  L hip CRPP    Surgeons      * Donnie Meek - Primary    Resident/Fellow/Other Assistant:  No surgical staff documented.    Procedure Summary  Anesthesia: * No anesthesia type entered *  ASA: III  Anesthesia Staff: Anesthesiologist: Gulshan Ray MD  Estimated Blood Loss: 200mL  Intra-op Medications: * No intraprocedure medications in log *           Anesthesia Record               Intraprocedure I/O Totals          Intake    lactated Ringer's infusion 1600.00 mL    Total Intake 1600 mL       Output    Est. Blood Loss 200 mL    Total Output 200 mL       Net    Net Volume 1400 mL          Specimen: No specimens collected     Staff:   Circulator: Alexy Erwin RN; Megan He RN  Relief Scrub: Stacy Metcalf RN  Scrub Person: Thania Cardona RN          Findings: L minimally displaced, valgua impacted femoral neck fracture    Complications:  None; patient tolerated the procedure well.     Disposition: PACU - hemodynamically stable.  Condition: stable  Specimens Collected: No specimens collected  Attending Attestation: I was present and scrubbed for the entire procedure.    Donnie Meek  Phone Number: [phone]

## 2023-10-07 NOTE — ANESTHESIA POSTPROCEDURE EVALUATION
Patient: Saleem Alba    Procedure Summary       Date: 10/07/23 Room / Location: [facility] OR 03 / Virtual [facility] OR    Anesthesia Start: 1411 Anesthesia Stop: 1619    Procedure: Hip Fracture ORIF w/Nail Recon (Left: Hip) Diagnosis:     Surgeons: Donnie Meek MD MPH Responsible Provider: Gulshan Ray MD    Anesthesia Type: general ASA Status: 3            Anesthesia Type: general    Vitals Value Taken Time   /74 10/07/23 1620   Temp 36.3 °C (97.4 °F) 10/07/23 1606   Pulse 76 10/07/23 1606   Resp 16 10/07/23 1606   SpO2 99 % 10/07/23 1606       Anesthesia Post Evaluation    Patient location during evaluation: PACU  Patient participation: complete - patient participated  Level of consciousness: responsive to light touch and sleepy but conscious  Pain score: 7  Pain management: inadequate (administering morphine in PACU)  Multimodal analgesia pain management approach  Airway patency: patent  Cardiovascular status: acceptable  Respiratory status: acceptable and face mask  Hydration status: acceptable      No notable events documented.

## 2023-10-07 NOTE — ANESTHESIA PREPROCEDURE EVALUATION
Patient: Saleem Alba    Procedure Information       Date/Time: 10/07/23 1330    Procedure: Hip Fracture ORIF w/Nail Recon (Left: Hip)    Location: [facility] / [facility]    Surgeons: Donnie Meek MD MPH        History Of Present Illness  Saleem Alba is a 78 y.o. male presenting with past medical history as below that includes stroke with residual left-sided weakness who is mostly wheelchair-bound but able to use a walker with difficulty at baseline.  He presented to the ER after a fall at home described as tripped and fell while trying to get out of the dining table to the room.  He did hit his head but no loss of consciousness reported.  He started to have pain in the left hip area.  He denies pain at other locations.  ER course: Patient was awake, alert, oriented.  He is in no acute distress.  He does have dysarthria and left-sided weakness from prior stroke.  His vitals were stable.  There is mild leukocytosis 12.3 likely reactive, and chemistry in general is within acceptable range.  CT head reported negative for acute intracranial pathology.  Chest x-ray did not reveal rib fractures.  X-ray of the left hip and left femur did confirm impacted left subcapital femoral fracture.  Orthopedic surgery consulted by ER provider.  Admission is for official orthopedic consultation, rehab, and further management.    Relevant Problems   Cardiovascular   (+) Carotid artery disease (CMS/HCC)   (+) Carotid artery occlusion   (+) Essential hypertension   (+) Mixed hyperlipidemia      Endocrine   (+) Hypothyroidism      Neuro/Psych   (+) Carotid artery disease (CMS/HCC)   (+) Carotid artery occlusion   (+) Depressive disorder      Hematology  On plavix for hx CVA       Clinical information reviewed:    Allergies    Med Hx             NPO Detail:  No data recorded     Vitals:    10/07/23 0812   BP: 115/70   Pulse: 73   Resp: 20   Temp: 36.9 °C (98.5 °F)   SpO2: 90%       Past Surgical History:   Procedure  Laterality Date    MR HEAD ANGIO WO IV CONTRAST  3/13/2023    MR HEAD ANGIO WO IV CONTRAST [facility]    MR NECK ANGIO WO IV CONTRAST  6/7/2022    MR NECK ANGIO WO IV CONTRAST 6/7/2022 [facility]    MR NECK ANGIO WO IV CONTRAST  3/13/2023    MR NECK ANGIO WO IV CONTRAST [facility]    OTHER SURGICAL HISTORY  04/13/2022    Inguinal hernia repair     Past Medical History:   Diagnosis Date    Personal history of other diseases of the digestive system     History of gastroesophageal reflux (GERD)    Personal history of other endocrine, nutritional and metabolic disease     History of hypercholesterolemia    Personal history of other mental and behavioral disorders     History of depression    Personal history of transient ischemic attack (TIA), and cerebral infarction without residual deficits     History of cerebrovascular accident       Current Facility-Administered Medications:     HYDROcodone-acetaminophen (Norco) 5-325 mg per tablet 1 tablet, 1 tablet, oral, q6h PRN, Christina Will MD    HYDROmorphone (Dilaudid) injection 0.2 mg, 0.2 mg, intravenous, q4h PRN, Christina Will MD, 0.2 mg at 10/07/23 0228    Current Outpatient Medications:     atorvastatin (Lipitor) 40 mg tablet, Take 1 tablet (40 mg) by mouth once daily., Disp: 90 tablet, Rfl: 1    clopidogrel (Plavix) 75 mg tablet, Take 1 tablet (75 mg) by mouth once daily., Disp: 90 tablet, Rfl: 1    cyanocobalamin (Vitamin B-12) 1,000 mcg tablet, Take by mouth., Disp: , Rfl:     ergocalciferol (Vitamin D-2) 1.25 MG (98575 UT) capsule, Take 1 capsule (50,000 Units) by mouth 1 (one) time per week., Disp: 12 capsule, Rfl: 1    folic acid 0.8 mg capsule, Take by mouth., Disp: , Rfl:     levothyroxine (Synthroid, Levoxyl) 112 mcg tablet, Take 1.5 tablet on Sundays and 1 tablet all other days, Disp: 100 tablet, Rfl: 1    melatonin 3 mg capsule, , Disp: , Rfl:     oxybutynin (Ditropan) 5 mg tablet, Take 1 tablet (5 mg) by mouth in the morning and 1 tablet (5 mg) in the  evening and 1 tablet (5 mg) before bedtime., Disp: 270 tablet, Rfl: 1    sertraline (Zoloft) 100 mg tablet, Take 1 tablet (100 mg) by mouth once daily., Disp: 90 tablet, Rfl: 1  Prior to Admission medications    Medication Sig Start Date End Date Taking? Authorizing Provider   atorvastatin (Lipitor) 40 mg tablet Take 1 tablet (40 mg) by mouth once daily. 4/13/23 10/10/23  Mohan Hernandez DO   clopidogrel (Plavix) 75 mg tablet Take 1 tablet (75 mg) by mouth once daily. 4/13/23 10/10/23  Mohan Hernandez DO   cyanocobalamin (Vitamin B-12) 1,000 mcg tablet Take by mouth. 10/28/14   Historical Provider, MD   ergocalciferol (Vitamin D-2) 1.25 MG (58798 UT) capsule Take 1 capsule (50,000 Units) by mouth 1 (one) time per week. 4/13/23 10/10/23  Mohan Hernandez DO   folic acid 0.8 mg capsule Take by mouth. 3/6/18   Historical Provider, MD   levothyroxine (Synthroid, Levoxyl) 112 mcg tablet Take 1.5 tablet on Sundays and 1 tablet all other days 23   Mohan Hernandez DO   melatonin 3 mg capsule  3/6/18   Historical Provider, MD   oxybutynin (Ditropan) 5 mg tablet Take 1 tablet (5 mg) by mouth in the morning and 1 tablet (5 mg) in the evening and 1 tablet (5 mg) before bedtime. 4/13/23 10/10/23  Mohan Hernandez DO   sertraline (Zoloft) 100 mg tablet Take 1 tablet (100 mg) by mouth once daily. 4/13/23 10/10/23  Mohan Hernandez DO     Allergies   Allergen Reactions    Hexadrol Rash     Social History     Tobacco Use    Smoking status: Former     Packs/day: 1     Types: Cigarettes     Quit date:      Years since quittin.7    Smokeless tobacco: Never   Substance Use Topics    Alcohol use: Never         Chemistry    Lab Results   Component Value Date/Time     10/07/2023 0233    K 3.7 10/07/2023 0233     10/07/2023 0233    CO2 26 10/07/2023 0233    BUN 27 (H) 10/07/2023 0233    CREATININE 0.88 10/07/2023 0233    Lab Results   Component Value Date/Time    CALCIUM 9.3  10/07/2023 0233    ALKPHOS 47 10/07/2023 0233    AST 26 10/07/2023 0233    ALT 28 10/07/2023 0233    BILITOT 0.7 10/07/2023 0233          Lab Results   Component Value Date/Time    WBC 12.3 (H) 10/07/2023 0233    HGB 13.5 10/07/2023 0233    HCT 40.1 (L) 10/07/2023 0233     10/07/2023 0233     Lab Results   Component Value Date/Time    PROTIME 12.4 10/07/2023 0233    INR 1.1 10/07/2023 0233     No results found for this or any previous visit (from the past 4464 hour(s)).    Nuclear Stress Test 8/11/2022  IMPRESSION:  Small area of fixed perfusion defect of the apical segment, either  soft tissue attenuation or small myocardial scar in LAD territory. No  prone images available. No ischemia was identified.  Well-maintained left ventricular function.  No results found for this or any previous visit from the past 1095 days.     Physical Exam    Airway  TM distance: >3 FB  Neck ROM: full     Cardiovascular   Rhythm: regular  Rate: normal     Dental        Pulmonary   Breath sounds clear to auscultation     Abdominal   Abdomen: soft  Bowel sounds: normal     Other findings: Poor dentition, 4 teeth, all loose        Anesthesia Plan    ASA 3     general     The patient is not a current smoker.  Patient was not previously instructed to abstain from smoking on day of procedure.  Patient did not smoke on day of procedure.    intravenous induction   Postoperative administration of opioids is intended.  Anesthetic plan and risks discussed with patient.  Use of blood products discussed with patient who.    Plan discussed with attending.

## 2023-10-07 NOTE — PROGRESS NOTES
Patient care signed out to me by Dr. Seo. Pt admitted.    MDM/ED Course  ED Course as of 11/28/23 0946   Sat Oct 07, 2023   0142 Patient's head CT is negative x-ray of the hip and pelvis I do show impacted left femoral subcapital fracture.,  I have ordered preop labs discussed with the hospitalist I will consult orthopedic surgery and patient will be hospitalized. [MT]      ED Course User Index  [MT] Lucy Seo MD         Diagnoses as of 11/28/23 0946   Closed fracture of left hip, initial encounter (CMS/[facility])   Occlusion of carotid artery, unspecified laterality   Closed fracture of shaft of right femur with nonunion, unspecified fracture morphology, subsequent encounter     EKG obtained at 232 was interpreted by me, rate 65, sinus rhythm, , QTc is 475, , right bundle branch block, inverted T wave in V2 and V3

## 2023-10-07 NOTE — H&P
History Of Present Illness  Saleem Alba is a 78 y.o. male presenting with past medical history as below that includes stroke with residual left-sided weakness who is mostly wheelchair-bound but able to use a walker with difficulty at baseline.  He presented to the ER after a fall at home described as tripped and fell while trying to get out of the dining table to the room.  He did hit his head but no loss of consciousness reported.  He started to have pain in the left hip area.  He denies pain at other locations.  ER course: Patient was awake, alert, oriented.  He is in no acute distress.  He does have dysarthria and left-sided weakness from prior stroke.  His vitals were stable.  There is mild leukocytosis 12.3 likely reactive, and chemistry in general is within acceptable range.  CT head reported negative for acute intracranial pathology.  Chest x-ray did not reveal rib fractures.  X-ray of the left hip and left femur did confirm impacted left subcapital femoral fracture.  Orthopedic surgery consulted by ER provider.  Admission is for official orthopedic consultation, rehab, and further management.      Past Medical History  He has a past medical history of Personal history of other diseases of the digestive system, Personal history of other endocrine, nutritional and metabolic disease, Personal history of other mental and behavioral disorders, and Personal history of transient ischemic attack (TIA), and cerebral infarction without residual deficits.    Surgical History  He has a past surgical history that includes Other surgical history (04/13/2022); MR angio neck wo IV contrast (6/7/2022); MR angio neck wo IV contrast (3/13/2023); and MR angio head wo IV contrast (3/13/2023).     Social History  He reports that he quit smoking about 16 years ago. His smoking use included cigarettes. He smoked an average of 1 pack per day. He has never used smokeless tobacco. He reports that he does not drink alcohol and does  "not use drugs.    Family History  Family History   Problem Relation Name Age of Onset    Heart attack Mother      Coronary artery disease Mother      Coronary artery disease Brother      Heart attack Brother      Diabetes Other          Allergies  Hexadrol    Review of Systems  10/10 points review of system were conducted, negative except    Physical Exam  Constitutional:       Appearance: Normal appearance.   HENT:      Head: Normocephalic and atraumatic.      Nose: No congestion or rhinorrhea.      Mouth/Throat:      Mouth: Mucous membranes are moist.      Pharynx: No posterior oropharyngeal erythema.   Eyes:      Pupils: Pupils are equal, round, and reactive to light.   Cardiovascular:      Rate and Rhythm: Normal rate and regular rhythm.   Pulmonary:      Effort: Pulmonary effort is normal.      Breath sounds: Normal breath sounds. No wheezing or rhonchi.   Abdominal:      General: Abdomen is flat. There is no distension.      Palpations: Abdomen is soft. There is no mass.      Tenderness: There is no abdominal tenderness.   Musculoskeletal:      Cervical back: Normal range of motion and neck supple.      Comments: Patient does have limited range of motion of the left upper and lower extremity with some contractures due to hemiplegia from prior stroke.   Skin:     General: Skin is warm and dry.   Neurological:      Mental Status: He is alert and oriented to person, place, and time. Mental status is at baseline.      Comments: Patient does have left facial droop, left eyelid drop, and weakness of both left upper and lower extremities motor 1/5   Psychiatric:         Mood and Affect: Mood normal.          Last Recorded Vitals  Blood pressure 137/75, pulse 66, temperature 36.7 °C (98.1 °F), temperature source Temporal, resp. rate 18, height 1.753 m (5' 9\"), weight 72.6 kg (160 lb), SpO2 94 %.    Relevant Results    Scheduled medications     Continuous medications     PRN medications  PRN medications: " HYDROcodone-acetaminophen, HYDROmorphone    Current Outpatient Medications   Medication Instructions    atorvastatin (LIPITOR) 40 mg, oral, Daily    clopidogrel (PLAVIX) 75 mg, oral, Daily    cyanocobalamin (Vitamin B-12) 1,000 mcg tablet oral    ergocalciferol (VITAMIN D-2) 50,000 Units, oral, Weekly    folic acid 0.8 mg capsule oral    levothyroxine (Synthroid, Levoxyl) 112 mcg tablet Take 1.5 tablet on Sundays and 1 tablet all other days    melatonin 3 mg capsule     oxybutynin (DITROPAN) 5 mg, oral, 3 times daily    sertraline (ZOLOFT) 100 mg, oral, Daily      Results for orders placed or performed during the hospital encounter of 10/06/23 (from the past 24 hour(s))   CBC and Auto Differential   Result Value Ref Range    WBC 12.3 (H) 4.4 - 11.3 x10*3/uL    nRBC 0.0 0.0 - 0.0 /100 WBCs    RBC 4.52 4.50 - 5.90 x10*6/uL    Hemoglobin 13.5 13.5 - 17.5 g/dL    Hematocrit 40.1 (L) 41.0 - 52.0 %    MCV 89 80 - 100 fL    MCH 29.9 26.0 - 34.0 pg    MCHC 33.7 32.0 - 36.0 g/dL    RDW 13.2 11.5 - 14.5 %    Platelets 194 150 - 450 x10*3/uL    MPV 10.5 7.5 - 11.5 fL    Neutrophils % 86.5 40.0 - 80.0 %    Immature Granulocytes %, Automated 0.3 0.0 - 0.9 %    Lymphocytes % 9.2 13.0 - 44.0 %    Monocytes % 3.4 2.0 - 10.0 %    Eosinophils % 0.4 0.0 - 6.0 %    Basophils % 0.2 0.0 - 2.0 %    Neutrophils Absolute 10.65 (H) 1.60 - 5.50 x10*3/uL    Immature Granulocytes Absolute, Automated 0.04 0.00 - 0.50 x10*3/uL    Lymphocytes Absolute 1.13 0.80 - 3.00 x10*3/uL    Monocytes Absolute 0.42 0.05 - 0.80 x10*3/uL    Eosinophils Absolute 0.05 0.00 - 0.40 x10*3/uL    Basophils Absolute 0.02 0.00 - 0.10 x10*3/uL   Basic metabolic panel   Result Value Ref Range    Glucose 101 (H) 74 - 99 mg/dL    Sodium 140 136 - 145 mmol/L    Potassium 3.7 3.5 - 5.3 mmol/L    Chloride 107 98 - 107 mmol/L    Bicarbonate 26 21 - 32 mmol/L    Anion Gap 11 10 - 20 mmol/L    Urea Nitrogen 27 (H) 6 - 23 mg/dL    Creatinine 0.88 0.50 - 1.30 mg/dL    eGFR 87 >60  mL/min/1.73m*2    Calcium 9.3 8.6 - 10.3 mg/dL   Hepatic function panel   Result Value Ref Range    Albumin 3.9 3.4 - 5.0 g/dL    Bilirubin, Total 0.7 0.0 - 1.2 mg/dL    Bilirubin, Direct 0.2 0.0 - 0.3 mg/dL    Alkaline Phosphatase 47 33 - 136 U/L    ALT 28 10 - 52 U/L    AST 26 9 - 39 U/L    Total Protein 7.1 6.4 - 8.2 g/dL   Protime-INR   Result Value Ref Range    Protime 12.4 9.8 - 12.8 seconds    INR 1.1 0.9 - 1.1         XR chest 1 view    Result Date: 10/7/2023  Interpreted By:  Austin Webster, STUDY: XR CHEST 1 VIEW;  10/7/2023 2:02 am   INDICATION: Hypertension status post fall   COMPARISON: Chest x-ray 03/12/2023   ACCESSION NUMBER(S): [id number]   ORDERING CLINICIAN: PEPE MORAN   FINDINGS: Overlying leads noted.   CARDIOMEDIASTINAL SILHOUETTE: Cardiomediastinal silhouette is stable in size and configuration. Atherosclerotic calcification of the aorta.   LUNGS: There is subtle increased airspace opacity overlying the left mid lung which may relate to atelectasis versus developing airspace disease. Right lung is clear. No consolidation, effusion or pneumothorax   ABDOMEN: No remarkable upper abdominal findings.   BONES: Multilevel degenerative changes of the spine.       Subtle increased airspace opacity in the left mid lung may relate to atelectasis versus developing airspace disease. Correlate clinically.   MACRO: None   Signed by: Austin Webster 10/7/2023 2:04 AM Dictation workstation:   [id number]    XR hip left 2 or 3 views    Result Date: 10/7/2023  Interpreted By:  Austin Webster, STUDY: XR HIP LEFT 2 OR 3 VIEWS; XR FEMUR LEFT 2+ VIEWS; ;  10/7/2023 1:03 am   INDICATION: Signs/Symptoms:fall.   COMPARISON: Pelvic x-rays 3/12/2023.   ACCESSION NUMBER(S): [id number]; [id number]   ORDERING CLINICIAN: PEPE MORAN   FINDINGS: AP view of the pelvis as well as AP and lateral views of the left hip and femur are obtained.   Generalized diffuse osteopenia. There is an acute mildly impacted  subcapital femoral fracture. The femoral head articulates with the acetabulum. Mild bilateral hip osteoarthrosis. Degenerative changes of the lower lumbar spine, SI joints and symphysis pubis. Left knee osteoarthrosis. No significant knee joint effusion. Enthesopathy changes noted along the superior pole of the patella.       Acute impacted left femoral subcapital fracture.   Degenerative changes as described above.   MACRO: None   Signed by: Austin Webster 10/7/2023 1:21 AM Dictation workstation:   [id number]    XR femur left 2+ views    Result Date: 10/7/2023  Interpreted By:  Austin Webster, STUDY: XR HIP LEFT 2 OR 3 VIEWS; XR FEMUR LEFT 2+ VIEWS; ;  10/7/2023 1:03 am   INDICATION: Signs/Symptoms:fall.   COMPARISON: Pelvic x-rays 3/12/2023.   ACCESSION NUMBER(S): [id number]; [id number]   ORDERING CLINICIAN: PEPE MORAN   FINDINGS: AP view of the pelvis as well as AP and lateral views of the left hip and femur are obtained.   Generalized diffuse osteopenia. There is an acute mildly impacted subcapital femoral fracture. The femoral head articulates with the acetabulum. Mild bilateral hip osteoarthrosis. Degenerative changes of the lower lumbar spine, SI joints and symphysis pubis. Left knee osteoarthrosis. No significant knee joint effusion. Enthesopathy changes noted along the superior pole of the patella.       Acute impacted left femoral subcapital fracture.   Degenerative changes as described above.   MACRO: None   Signed by: Austin Webster 10/7/2023 1:21 AM Dictation workstation:   [id number]    CT head wo IV contrast    Result Date: 10/7/2023  Interpreted By:  Eagle Rose, STUDY: CT HEAD WO IV CONTRAST;  10/7/2023 12:31 am   INDICATION: Signs/Symptoms:fall.   COMPARISON: Noncontrast head CT of 03/12/2023.   ACCESSION NUMBER(S): [id number]   ORDERING CLINICIAN: PEPE MORAN   TECHNIQUE: Noncontrast axial CT scan of head was performed. Angled reformats in brain and bone windows were generated.  The images were reviewed in bone, brain, blood and soft tissue windows.   FINDINGS: CSF Spaces: Redemonstrated ex vacuo dilation of the right lateral ventricle. Ventricular system is stable in caliber without definite hydrocephalus. There is no extraaxial fluid collection.   Parenchyma: Moderate to advanced volume loss.  There is periventricular and subcortical white matter hypoattenuation, most in keeping with chronic microvascular ischemic change. Stable right cerebral hemispheric encephalomalacia. The grey-white differentiation is intact. There is no mass effect or midline shift.  There is no intracranial hemorrhage.   Calvarium: The calvarium is unremarkable.   Paranasal sinuses and mastoids: Visualized paranasal sinuses and mastoids are clear.       No evidence of acute cortical infarct or intracranial hemorrhage. No substantial change relative to the prior exam.   MACRO: None   Signed by: Eagle Rose 10/7/2023 12:36 AM Dictation workstation:   [id number]             Assessment/Plan   Principal Problem:    Closed fracture of left hip, initial encounter (CMS/[facility])  Active Problems:    Cerebral infarction (CMS/[facility])    Essential hypertension    Hypothyroidism    Plan:  -Optimize pain control.  -Keep n.p.o. for now awaiting orthopedics evaluation.  -Hold Plavix for now awaiting orthopedics consult.  -Gentle IV fluid hydration.  -Fall precautions.  -Resume home medication levothyroxine  -Blood pressure monitoring.  So far well controlled.  -Resume Ditropan for hyperactive bladder.  -Resume home medication Zoloft for depression.  -OT/PT consultation    Christina Will MD

## 2023-10-07 NOTE — PROGRESS NOTES
Saleem Flores [id number]   Service: Internal Medicine / Hospitalist Date of service 10/7/23        .              History Of Present Illness  Saleem Alba is a 78 y.o. male presenting with past medical history as below that includes stroke with residual left-sided weakness who is mostly wheelchair-bound but able to use a walker with difficulty at baseline.  He presented to the ER after a fall at home described as tripped and fell while trying to get out of the dining table to the room.  He did hit his head but no loss of consciousness reported.  He started to have pain in the left hip area.  He denies pain at other locations.  ER course: Patient was awake, alert, oriented.  He is in no acute distress.  He does have dysarthria and left-sided weakness from prior stroke.  His vitals were stable.  There is mild leukocytosis 12.3 likely reactive, and chemistry in general is within acceptable range.  CT head reported negative for acute intracranial pathology.  Chest x-ray did not reveal rib fractures.  X-ray of the left hip and left femur did confirm impacted left subcapital femoral fracture.  Orthopedic surgery consulted by ER provider.  Admission is for official orthopedic consultation, rehab, and further management.        Past Medical History  He has a past medical history of Personal history of other diseases of the digestive system, Personal history of other endocrine, nutritional and metabolic disease, Personal history of other mental and behavioral disorders, and Personal history of transient ischemic attack (TIA), and cerebral infarction without residual deficits.     Surgical History  He has a past surgical history that includes Other surgical history (04/13/2022); MR angio neck wo IV contrast (6/7/2022); MR angio neck wo IV contrast (3/13/2023); and MR angio head wo IV contrast (3/13/2023).     Social History  He reports that he quit smoking about 16 years ago. His smoking use included cigarettes. He smoked an  average of 1 pack per day. He has never used smokeless tobacco. He reports that he does not drink alcohol and does not use drugs.             Above information gathered from admission H&P.        Subjective data:  Patient seen and examined, lab data and diagnostics reviewed.  No reported: acute symptomatology at the time of evaluation including but not limited to :chest pain, dyspnea, constipation, abdominal pain, dysuria , nausea, vomiting ,headache, new focal weakness, diaphoresis,fever, chills, syncope,presyncope or palpitations. The patient however did endorsed left hip pain      Review of Systems:   Review of system otherwise negative if not aforementioned above in subjective.    Objective    Physical Exam     Constitutional:       Appearance: Patient appeared in no acute cardiopulmonary distress.     Comments: Patient alert and oriented to person place time and situation.  HENT:      Head: Normocephalic and atraumatic.Trachea midline      Nose: Nose normal. No congestion or rhinorrhea.     Mouth: Mucous membranes Moist.   Eyes:      Extraocular Movements: Extraocular movements intact.      Pupils: Pupils are equal, round, and reactive to light.      Comments: No scleral icterus or conjunctival injection appreciated.   Cardiovascular:      Rate and Rhythm: Normal rate and regular rhythm. No clicks rubs or gallops, normal S1 and S2.No peripheral stigmata of endocarditis appreciated.     Pulmonary:      Effort: Pulmonary effort is normal.      Breath sounds: Lungs clear to auscultation globally  Abdominal:      General: Abdomen soft, nontender, active bowel sounds, no involuntary guarding or rebound tenderness appreciated.     Comments: None   Musculoskeletal:         General: No Swelling present.   Comment:Patient does have limited range of motion of the left upper and lower extremity with some contractures due to hemiplegia from prior stroke.   Lymphadenopathy:      No appreciable palpable  lymphadenopathy  Skin:     General: Skin is warm.      Coloration:  No jaundice     Findings: No abnormal appearing skin rashes or lesions that appeared acute noted on unclothed area of the skin..   Neurological:      General: No  new focal sensory or motor deficits appreciated, no meningeal signs or dysmetria noted. Cranial Nerves: Cranial nerves II to XII appearing grossly intact.  Comment: ( chronic) Patient does have left facial droop, left eyelid drop, and weakness of both left upper and lower extremities motor 1/5 and mild expressive aphasia .         Psychiatric:         The patient appears to be displaying normal mood and affect at the time of evaluation.    Labs:     Lab Results   Component Value Date    GLUCOSE 101 (H) 10/07/2023    CALCIUM 9.3 10/07/2023     10/07/2023    K 3.7 10/07/2023    CO2 26 10/07/2023     10/07/2023    BUN 27 (H) 10/07/2023    CREATININE 0.88 10/07/2023      Lab Results   Component Value Date    WBC 12.3 (H) 10/07/2023    HGB 13.5 10/07/2023    HCT 40.1 (L) 10/07/2023    MCV 89 10/07/2023     10/07/2023      [unfilled]   [unfilled]   No results found for the last 90 days.   X-rays/ Images      Radiology Results (last 21 days)    Procedure Component Value Units Date/Time   XR chest 1 view [[id number]] Collected: 10/07/23 0205   Order Status: Completed Updated: 10/07/23 0206   Narrative:     Interpreted By:  Austin Webster,  STUDY:  XR CHEST 1 VIEW;  10/7/2023 2:02 am      INDICATION:  Hypertension status post fall      COMPARISON:  Chest x-ray 03/12/2023      ACCESSION NUMBER(S):  [id number]      ORDERING CLINICIAN:  PEPE MORAN      FINDINGS:  Overlying leads noted.      CARDIOMEDIASTINAL SILHOUETTE:  Cardiomediastinal silhouette is stable in size and configuration.  Atherosclerotic calcification of the aorta.      LUNGS:  There is subtle increased airspace opacity overlying the left mid  lung which may relate to atelectasis versus developing  airspace  disease. Right lung is clear. No consolidation, effusion or  pneumothorax      ABDOMEN:  No remarkable upper abdominal findings.      BONES:  Multilevel degenerative changes of the spine.       Impression:     Subtle increased airspace opacity in the left mid lung may relate to  atelectasis versus developing airspace disease. Correlate clinically.      MACRO:  None      Signed by: Austin Webster 10/7/2023 2:04 AM  Dictation workstation:   [id number]   XR hip left 2 or 3 views [[id number]] Collected: 10/07/23 0122   Order Status: Completed Updated: 10/07/23 0127   Narrative:     Interpreted By:  Austin Webster,  STUDY:  XR HIP LEFT 2 OR 3 VIEWS; XR FEMUR LEFT 2+ VIEWS; ;  10/7/2023 1:03 am      INDICATION:  Signs/Symptoms:fall.      COMPARISON:  Pelvic x-rays 3/12/2023.      ACCESSION NUMBER(S):  [id number]; [id number]      ORDERING CLINICIAN:  PEPE MORAN      FINDINGS:  AP view of the pelvis as well as AP and lateral views of the left hip  and femur are obtained.      Generalized diffuse osteopenia. There is an acute mildly impacted  subcapital femoral fracture. The femoral head articulates with the  acetabulum. Mild bilateral hip osteoarthrosis. Degenerative changes  of the lower lumbar spine, SI joints and symphysis pubis. Left knee  osteoarthrosis. No significant knee joint effusion. Enthesopathy  changes noted along the superior pole of the patella.       Impression:     Acute impacted left femoral subcapital fracture.      Degenerative changes as described above.      MACRO:  None      Signed by: Austin Webster 10/7/2023 1:21 AM  Dictation workstation:   [id number]   XR femur left 2+ views [[id number]] Collected: 10/07/23 0122   Order Status: Completed Updated: 10/07/23 0127   Narrative:     Interpreted By:  Austin Webster,  STUDY:  XR HIP LEFT 2 OR 3 VIEWS; XR FEMUR LEFT 2+ VIEWS; ;  10/7/2023 1:03 am      INDICATION:  Signs/Symptoms:fall.      COMPARISON:  Pelvic x-rays 3/12/2023.       ACCESSION NUMBER(S):  [id number]; [id number]      ORDERING CLINICIAN:  PEPE MORAN      FINDINGS:  AP view of the pelvis as well as AP and lateral views of the left hip  and femur are obtained.      Generalized diffuse osteopenia. There is an acute mildly impacted  subcapital femoral fracture. The femoral head articulates with the  acetabulum. Mild bilateral hip osteoarthrosis. Degenerative changes  of the lower lumbar spine, SI joints and symphysis pubis. Left knee  osteoarthrosis. No significant knee joint effusion. Enthesopathy  changes noted along the superior pole of the patella.       Impression:     Acute impacted left femoral subcapital fracture.      Degenerative changes as described above.      MACRO:  None      Signed by: Austin Webster 10/7/2023 1:21 AM  Dictation workstation:   [id number]   CT head wo IV contrast [[id number]] Collected: 10/07/23 0038   Order Status: Completed Updated: 10/07/23 0038   Narrative:     Interpreted By:  Eagle Rose,  STUDY:  CT HEAD WO IV CONTRAST;  10/7/2023 12:31 am      INDICATION:  Signs/Symptoms:fall.      COMPARISON:  Noncontrast head CT of 03/12/2023.      ACCESSION NUMBER(S):  [id number]      ORDERING CLINICIAN:  PEPE MORAN      TECHNIQUE:  Noncontrast axial CT scan of head was performed. Angled reformats in  brain and bone windows were generated. The images were reviewed in  bone, brain, blood and soft tissue windows.      FINDINGS:  CSF Spaces: Redemonstrated ex vacuo dilation of the right lateral  ventricle. Ventricular system is stable in caliber without definite  hydrocephalus. There is no extraaxial fluid collection.      Parenchyma: Moderate to advanced volume loss.  There is  periventricular and subcortical white matter hypoattenuation, most in  keeping with chronic microvascular ischemic change. Stable right  cerebral hemispheric encephalomalacia. The grey-white differentiation  is intact. There is no mass effect or midline shift.  There is  no  intracranial hemorrhage.      Calvarium: The calvarium is unremarkable.      Paranasal sinuses and mastoids: Visualized paranasal sinuses and  mastoids are clear.       Impression:     No evidence of acute cortical infarct or intracranial hemorrhage. No  substantial change relative to the prior exam.      MACRO:  None      Signed by: Eagle Rose 10/7/2023 12:36 AM  Dictation workstation:   [id number]             Medical Problems       Problem List       * (Principal) Closed fracture of left hip, initial encounter (CMS/[facility])    Carotid artery disease (CMS/[facility])    Carotid artery occlusion    Cerebral infarction (CMS/[facility])    Cobalamin deficiency    Depressive disorder    Overview Signed 4/13/2023 11:50 AM by Marni Méndez MA     [place] [facility] [place]         Essential hypertension    Overview Signed 4/13/2023 11:50 AM by Marni Méndez MA     [place] [facility] [place]         Hypothyroidism    Overview Signed 4/13/2023 11:50 AM by Marni Méndez MA     [place] [facility] [place]         Lower extremity edema    Mixed hyperlipidemia    Overview Signed 4/13/2023 11:50 AM by Marni Méndez MA     [place] [facility] [place]         Vitamin D deficiency               Above medical problems may be reflective of historical medical problems that will resolve and may not related to acute clinical condition/medical problems.    Clinical impression/plan:    Principal Problem:    1.Closed fracture of left hip, initial encounter (CMS/[facility])  Active Problems:    2.Cerebral infarction (CMS/[facility])    3.Essential hypertension    4. Hypothyroidism     Plan:  -Optimize pain control.  -Keep n.p.o. for now awaiting orthopedics evaluation.  -Hold Plavix for now awaiting orthopedics consult.  -Gentle IV fluid hydration.  -Fall precautions.  -Resume home medication levothyroxine  -Blood pressure monitoring.  So far well controlled.  -Resume Ditropan for hyperactive bladder.  -Resume home medication Zoloft for depression.  -OT/PT consultation       Additional care plan: Discussed with  orthopedic service, recommendation by orthopedic service for CT scan of the hip.The patient is in agreement with orthopedic service recommendation for surgical intervention.  Patient understand the possible risk and complications of surgical intervention.  Geriatric-sensitive perioperative cardiac risk index ( GSCRI): Probability for perioperative myocardial infarction or cardiac arrest 6.5%    The patient was informed of differential diagnosis , work up , plan of care and possible sequelae of clinical disposition.Patient in agreement with plan of care. Further recommendations forthcoming in accordance with patient's clinical disposition and response to care.    Discharge planning:Anticipate discharge in the next 48 to 72 hours    Care time: >55 mins    Dictation performed with assistance of voice recognition device therefore transcription errors are possible.    Addendum: Communicated with orthopedic service post surgical intervention it appears that patient did quite well in surgery.  Orthopedic service recommended Lovenox for DVT prophylaxis and Ancef 2 g IV every 8 for 2 doses.  We will place order.

## 2023-10-08 LAB
ANION GAP SERPL CALC-SCNC: 8 MMOL/L (ref 10–20)
APPEARANCE UR: CLEAR
BILIRUB UR STRIP.AUTO-MCNC: NEGATIVE MG/DL
BUN SERPL-MCNC: 20 MG/DL (ref 6–23)
CALCIUM SERPL-MCNC: 7.9 MG/DL (ref 8.6–10.3)
CHLORIDE SERPL-SCNC: 106 MMOL/L (ref 98–107)
CO2 SERPL-SCNC: 29 MMOL/L (ref 21–32)
COLOR UR: YELLOW
CREAT SERPL-MCNC: 0.84 MG/DL (ref 0.5–1.3)
ERYTHROCYTE [DISTWIDTH] IN BLOOD BY AUTOMATED COUNT: 13.4 % (ref 11.5–14.5)
GFR SERPL CREATININE-BSD FRML MDRD: 88 ML/MIN/1.73M*2
GLUCOSE SERPL-MCNC: 124 MG/DL (ref 74–99)
GLUCOSE UR STRIP.AUTO-MCNC: NEGATIVE MG/DL
HCT VFR BLD AUTO: 32.5 % (ref 41–52)
HGB BLD-MCNC: 10.6 G/DL (ref 13.5–17.5)
KETONES UR STRIP.AUTO-MCNC: NEGATIVE MG/DL
LEUKOCYTE ESTERASE UR QL STRIP.AUTO: NEGATIVE
MCH RBC QN AUTO: 29.7 PG (ref 26–34)
MCHC RBC AUTO-ENTMCNC: 32.6 G/DL (ref 32–36)
MCV RBC AUTO: 91 FL (ref 80–100)
NITRITE UR QL STRIP.AUTO: NEGATIVE
NRBC BLD-RTO: 0 /100 WBCS (ref 0–0)
PH UR STRIP.AUTO: 5 [PH]
PLATELET # BLD AUTO: 144 X10*3/UL (ref 150–450)
PMV BLD AUTO: 11.2 FL (ref 7.5–11.5)
POTASSIUM SERPL-SCNC: 3.8 MMOL/L (ref 3.5–5.3)
PROT UR STRIP.AUTO-MCNC: NEGATIVE MG/DL
RBC # BLD AUTO: 3.57 X10*6/UL (ref 4.5–5.9)
RBC # UR STRIP.AUTO: NEGATIVE /UL
SODIUM SERPL-SCNC: 139 MMOL/L (ref 136–145)
SP GR UR STRIP.AUTO: 1.02
UROBILINOGEN UR STRIP.AUTO-MCNC: 2 MG/DL
WBC # BLD AUTO: 9.3 X10*3/UL (ref 4.4–11.3)

## 2023-10-08 PROCEDURE — 80048 BASIC METABOLIC PNL TOTAL CA: CPT | Performed by: HOSPITALIST

## 2023-10-08 PROCEDURE — 99024 POSTOP FOLLOW-UP VISIT: CPT | Performed by: STUDENT IN AN ORGANIZED HEALTH CARE EDUCATION/TRAINING PROGRAM

## 2023-10-08 PROCEDURE — 96372 THER/PROPH/DIAG INJ SC/IM: CPT | Performed by: HOSPITALIST

## 2023-10-08 PROCEDURE — 2500000001 HC RX 250 WO HCPCS SELF ADMINISTERED DRUGS (ALT 637 FOR MEDICARE OP): Performed by: INTERNAL MEDICINE

## 2023-10-08 PROCEDURE — 99232 SBSQ HOSP IP/OBS MODERATE 35: CPT | Performed by: HOSPITALIST

## 2023-10-08 PROCEDURE — 85027 COMPLETE CBC AUTOMATED: CPT | Performed by: HOSPITALIST

## 2023-10-08 PROCEDURE — 81003 URINALYSIS AUTO W/O SCOPE: CPT | Performed by: STUDENT IN AN ORGANIZED HEALTH CARE EDUCATION/TRAINING PROGRAM

## 2023-10-08 PROCEDURE — 1210000001 HC SEMI-PRIVATE ROOM DAILY

## 2023-10-08 PROCEDURE — 36415 COLL VENOUS BLD VENIPUNCTURE: CPT | Performed by: HOSPITALIST

## 2023-10-08 PROCEDURE — 2500000004 HC RX 250 GENERAL PHARMACY W/ HCPCS (ALT 636 FOR OP/ED): Performed by: HOSPITALIST

## 2023-10-08 PROCEDURE — 2500000001 HC RX 250 WO HCPCS SELF ADMINISTERED DRUGS (ALT 637 FOR MEDICARE OP): Performed by: STUDENT IN AN ORGANIZED HEALTH CARE EDUCATION/TRAINING PROGRAM

## 2023-10-08 PROCEDURE — 2500000004 HC RX 250 GENERAL PHARMACY W/ HCPCS (ALT 636 FOR OP/ED): Performed by: INTERNAL MEDICINE

## 2023-10-08 PROCEDURE — 2580000001 HC RX 258 IV SOLUTIONS: Performed by: INTERNAL MEDICINE

## 2023-10-08 RX ORDER — SODIUM CHLORIDE 0.9 % (FLUSH) 0.9 %
SYRINGE (ML) INJECTION
Status: COMPLETED
Start: 2023-10-08 | End: 2023-10-08

## 2023-10-08 RX ADMIN — ENOXAPARIN SODIUM 40 MG: 40 INJECTION SUBCUTANEOUS at 10:08

## 2023-10-08 RX ADMIN — ACETAMINOPHEN 650 MG: 325 TABLET ORAL at 17:32

## 2023-10-08 RX ADMIN — ATORVASTATIN CALCIUM 40 MG: 40 TABLET, FILM COATED ORAL at 10:08

## 2023-10-08 RX ADMIN — OXYBUTYNIN CHLORIDE 5 MG: 5 TABLET ORAL at 20:20

## 2023-10-08 RX ADMIN — OXYCODONE HYDROCHLORIDE 5 MG: 5 TABLET ORAL at 15:02

## 2023-10-08 RX ADMIN — OXYCODONE HYDROCHLORIDE 5 MG: 5 TABLET ORAL at 05:24

## 2023-10-08 RX ADMIN — Medication: at 20:00

## 2023-10-08 RX ADMIN — OXYBUTYNIN CHLORIDE 5 MG: 5 TABLET ORAL at 15:00

## 2023-10-08 RX ADMIN — LEVOTHYROXINE SODIUM 112 MCG: 112 TABLET ORAL at 05:38

## 2023-10-08 RX ADMIN — SERTRALINE 100 MG: 100 TABLET, FILM COATED ORAL at 10:09

## 2023-10-08 RX ADMIN — CEFAZOLIN SODIUM 2 G: 2 INJECTION, SOLUTION INTRAVENOUS at 05:17

## 2023-10-08 RX ADMIN — SODIUM CHLORIDE, POTASSIUM CHLORIDE, SODIUM LACTATE AND CALCIUM CHLORIDE 100 ML/HR: 600; 310; 30; 20 INJECTION, SOLUTION INTRAVENOUS at 03:28

## 2023-10-08 RX ADMIN — CYANOCOBALAMIN TAB 1000 MCG 1000 MCG: 1000 TAB at 10:08

## 2023-10-08 RX ADMIN — OXYCODONE HYDROCHLORIDE 5 MG: 5 TABLET ORAL at 19:49

## 2023-10-08 RX ADMIN — Medication 3 MG: at 20:20

## 2023-10-08 RX ADMIN — OXYBUTYNIN CHLORIDE 5 MG: 5 TABLET ORAL at 10:09

## 2023-10-08 ASSESSMENT — PAIN DESCRIPTION - DESCRIPTORS
DESCRIPTORS: DULL
DESCRIPTORS: DULL

## 2023-10-08 ASSESSMENT — COGNITIVE AND FUNCTIONAL STATUS - GENERAL
TURNING FROM BACK TO SIDE WHILE IN FLAT BAD: A LOT
TOILETING: TOTAL
STANDING UP FROM CHAIR USING ARMS: A LOT
MOVING FROM LYING ON BACK TO SITTING ON SIDE OF FLAT BED WITH BEDRAILS: A LITTLE
HELP NEEDED FOR BATHING: A LOT
WALKING IN HOSPITAL ROOM: TOTAL
DAILY ACTIVITIY SCORE: 10
EATING MEALS: A LOT
CLIMB 3 TO 5 STEPS WITH RAILING: TOTAL
MOVING TO AND FROM BED TO CHAIR: A LOT
DRESSING REGULAR LOWER BODY CLOTHING: TOTAL
DRESSING REGULAR UPPER BODY CLOTHING: A LOT
MOBILITY SCORE: 11
PERSONAL GROOMING: A LOT

## 2023-10-08 ASSESSMENT — PAIN - FUNCTIONAL ASSESSMENT
PAIN_FUNCTIONAL_ASSESSMENT: 0-10

## 2023-10-08 ASSESSMENT — PAIN SCALES - GENERAL
PAINLEVEL_OUTOF10: 5 - MODERATE PAIN
PAINLEVEL_OUTOF10: 4
PAINLEVEL_OUTOF10: 6

## 2023-10-08 NOTE — PROGRESS NOTES
Saleem Flores [id number]   Service: Internal Medicine / Hospitalist Date of service 10/8/23         .                    History Of Present Illness  Saleem Alba is a 78 y.o. male presenting with past medical history as below that includes stroke with residual left-sided weakness who is mostly wheelchair-bound but able to use a walker with difficulty at baseline.  He presented to the ER after a fall at home described as tripped and fell while trying to get out of the dining table to the room.  He did hit his head but no loss of consciousness reported.  He started to have pain in the left hip area.  He denies pain at other locations.  ER course: Patient was awake, alert, oriented.  He is in no acute distress.  He does have dysarthria and left-sided weakness from prior stroke.  His vitals were stable.  There is mild leukocytosis 12.3 likely reactive, and chemistry in general is within acceptable range.  CT head reported negative for acute intracranial pathology.  Chest x-ray did not reveal rib fractures.  X-ray of the left hip and left femur did confirm impacted left subcapital femoral fracture.  Orthopedic surgery consulted by ER provider.  Admission is for official orthopedic consultation, rehab, and further management.        Past Medical History  He has a past medical history of Personal history of other diseases of the digestive system, Personal history of other endocrine, nutritional and metabolic disease, Personal history of other mental and behavioral disorders, and Personal history of transient ischemic attack (TIA), and cerebral infarction without residual deficits.     Surgical History  He has a past surgical history that includes Other surgical history (04/13/2022); MR angio neck wo IV contrast (6/7/2022); MR angio neck wo IV contrast (3/13/2023); and MR angio head wo IV contrast (3/13/2023).     Social History  He reports that he quit smoking about 16 years ago. His smoking use included cigarettes. He  smoked an average of 1 pack per day. He has never used smokeless tobacco. He reports that he does not drink alcohol and does not use drugs.                  Above information gathered from admission H&P.           Subjective data:  Patient seen and examined, lab data and diagnostics reviewed.  No reported: acute symptomatology at the time of evaluation including but not limited to :chest pain, dyspnea, constipation, abdominal pain, dysuria , nausea, vomiting ,headache, new focal weakness, diaphoresis,fever, chills, syncope,presyncope or palpitations. The patient however did endorsed left hip pain.    10/8......................Patient seen and examined in presence of family members patient's granddaughter, patient's son and patient's wife.  Patient reported overall he is doing fairly well did ask for analgesics therapy at hand of my interview and exam.  No reported : Dyspnea, cough, chest pain, fevers, chills, headaches, nausea, vomiting or palpitations.        Review of Systems:   Review of system otherwise negative if not aforementioned above in subjective.     Objective     Physical Exam    Constitutional:       Appearance: Patient appeared in no acute cardiopulmonary distress.     Comments: Patient alert and oriented to person place time and situation.  HENT:      Head: Normocephalic and atraumatic.Trachea midline      Nose: Nose normal. No congestion or rhinorrhea.     Mouth: Mucous membranes Moist.   Eyes:      Extraocular Movements: Extraocular movements intact.      Pupils: Pupils are equal, round, and reactive to light.      Comments: No scleral icterus or conjunctival injection appreciated.   Cardiovascular:      Rate and Rhythm: Normal rate and regular rhythm. No clicks rubs or gallops, normal S1 and S2.No peripheral stigmata of endocarditis appreciated.     Pulmonary:      Effort: Pulmonary effort is normal.      Breath sounds: Lungs clear to auscultation globally  Abdominal:      General: Abdomen soft,  nontender, active bowel sounds, no involuntary guarding or rebound tenderness appreciated.     Comments: None   Musculoskeletal:         General: No Swelling present.   Comment:Patient does have limited range of motion of the left upper and lower extremity with some contractures due to hemiplegia from prior stroke and superimposed left hip fracture status post arthroplasty.   Lymphadenopathy:      No appreciable palpable lymphadenopathy  Skin:     General: Skin is warm.      Coloration:  No jaundice     Findings: No abnormal appearing skin rashes or lesions that appeared acute noted on unclothed area of the skin..   Neurological:      General: No  new focal sensory or motor deficits appreciated, no meningeal signs or dysmetria noted. Cranial Nerves: Cranial nerves II to XII appearing grossly intact.  Comment: ( chronic) Patient does have left facial droop, left eyelid drop, and weakness of both left upper and lower extremities motor 1/5 and mild expressive aphasia .          Psychiatric:         The patient appears to be displaying normal mood and affect at the time of evaluation.     Labs:           Lab Results   Component Value Date     GLUCOSE 101 (H) 10/07/2023     CALCIUM 9.3 10/07/2023      10/07/2023     K 3.7 10/07/2023     CO2 26 10/07/2023      10/07/2023     BUN 27 (H) 10/07/2023     CREATININE 0.88 10/07/2023            Lab Results   Component Value Date     WBC 12.3 (H) 10/07/2023     HGB 13.5 10/07/2023     HCT 40.1 (L) 10/07/2023     MCV 89 10/07/2023      10/07/2023      Lab Results   Component Value Date    GLUCOSE 124 (H) 10/08/2023    CALCIUM 7.9 (L) 10/08/2023     10/08/2023    K 3.8 10/08/2023    CO2 29 10/08/2023     10/08/2023    BUN 20 10/08/2023    CREATININE 0.84 10/08/2023      Lab Results   Component Value Date    WBC 9.3 10/08/2023    HGB 10.6 (L) 10/08/2023    HCT 32.5 (L) 10/08/2023    MCV 91 10/08/2023     (L) 10/08/2023       [unfilled]   [unfilled]   No results found for the last 90 days.   X-rays/ Images     Most recent CT  CT scan.      Radiology Results (last 21 days)    Procedure Component Value Units Date/Time   FL less than 1 hour [[id number]] Resulted: 10/07/23 1432   Order Status: Sent Updated: 10/07/23 1543   CT hip left wo IV contrast [[id number]] Collected: 10/07/23 1252   Order Status: Completed Updated: 10/07/23 1252   Narrative:     Interpreted By:  Kris Wagner,  STUDY:  CT HIP LEFT WO IV CONTRAST;  10/7/2023 11:38 am      INDICATION:  Signs/Symptoms:evaluate L hip fracture.      COMPARISON:  Plain film examination same day reporting impacted left femoral  subcapital fracture.      ACCESSION NUMBER(S):  [id number]      ORDERING CLINICIAN:  VEL HAYNES      TECHNIQUE:  Helical CT of the pelvis was performed without IV contrast. Axial,  coronal, and sagittal reconstructions were performed and reviewed. In  addition, oblique reconstructions of the bilateral hips and 3D  reconstructions of the hips and pelvis were performed.      FINDINGS:  11 mm bone lesion present of the lower left L4 vertebral body  nonspecific. This likely reflects a Schmorl's node. Other less likely  possibilities include hemangioma, plasmacytoma, lytic metastasis  among other causes.      There is a nondisplaced fracture of the left femoral neck. Minimal  impaction. No distraction or butterfly fragments. Small joint  effusion.      Prostate gland is enlarged. Consider digital rectal examination and  correlation with PSA.          .       Impression:     Nondisplaced mildly impacted fracture of the left femoral neck.      Probable benign lesion of the L4 vertebral body.          MACRO:  None      Signed by: Kris Wagner 10/7/2023 12:50 PM       Radiology Results (last 21 days)     Procedure Component Value Units Date/Time   XR chest 1 view [[id number]] Collected: 10/07/23 0205   Order Status: Completed Updated: 10/07/23 0206    Narrative:     Interpreted By:  Austin Webster,  STUDY:  XR CHEST 1 VIEW;  10/7/2023 2:02 am      INDICATION:  Hypertension status post fall      COMPARISON:  Chest x-ray 03/12/2023      ACCESSION NUMBER(S):  [id number]      ORDERING CLINICIAN:  PEPE MORAN      FINDINGS:  Overlying leads noted.      CARDIOMEDIASTINAL SILHOUETTE:  Cardiomediastinal silhouette is stable in size and configuration.  Atherosclerotic calcification of the aorta.      LUNGS:  There is subtle increased airspace opacity overlying the left mid  lung which may relate to atelectasis versus developing airspace  disease. Right lung is clear. No consolidation, effusion or  pneumothorax      ABDOMEN:  No remarkable upper abdominal findings.      BONES:  Multilevel degenerative changes of the spine.       Impression:     Subtle increased airspace opacity in the left mid lung may relate to  atelectasis versus developing airspace disease. Correlate clinically.      MACRO:  None      Signed by: Austin Webster 10/7/2023 2:04 AM  Dictation workstation:   [id number]   XR hip left 2 or 3 views [[id number]] Collected: 10/07/23 0122   Order Status: Completed Updated: 10/07/23 0127   Narrative:     Interpreted By:  Austin Webster,  STUDY:  XR HIP LEFT 2 OR 3 VIEWS; XR FEMUR LEFT 2+ VIEWS; ;  10/7/2023 1:03 am      INDICATION:  Signs/Symptoms:fall.      COMPARISON:  Pelvic x-rays 3/12/2023.      ACCESSION NUMBER(S):  [id number]; [id number]      ORDERING CLINICIAN:  PEPE MORAN      FINDINGS:  AP view of the pelvis as well as AP and lateral views of the left hip  and femur are obtained.      Generalized diffuse osteopenia. There is an acute mildly impacted  subcapital femoral fracture. The femoral head articulates with the  acetabulum. Mild bilateral hip osteoarthrosis. Degenerative changes  of the lower lumbar spine, SI joints and symphysis pubis. Left knee  osteoarthrosis. No significant knee joint effusion. Enthesopathy  changes noted along the  superior pole of the patella.       Impression:     Acute impacted left femoral subcapital fracture.      Degenerative changes as described above.      MACRO:  None      Signed by: Austin Webster 10/7/2023 1:21 AM  Dictation workstation:   [id number]   XR femur left 2+ views [[id number]] Collected: 10/07/23 0122   Order Status: Completed Updated: 10/07/23 0127   Narrative:     Interpreted By:  Austin Webster,  STUDY:  XR HIP LEFT 2 OR 3 VIEWS; XR FEMUR LEFT 2+ VIEWS; ;  10/7/2023 1:03 am      INDICATION:  Signs/Symptoms:fall.      COMPARISON:  Pelvic x-rays 3/12/2023.      ACCESSION NUMBER(S):  [id number]; [id number]      ORDERING CLINICIAN:  PEPE MORAN      FINDINGS:  AP view of the pelvis as well as AP and lateral views of the left hip  and femur are obtained.      Generalized diffuse osteopenia. There is an acute mildly impacted  subcapital femoral fracture. The femoral head articulates with the  acetabulum. Mild bilateral hip osteoarthrosis. Degenerative changes  of the lower lumbar spine, SI joints and symphysis pubis. Left knee  osteoarthrosis. No significant knee joint effusion. Enthesopathy  changes noted along the superior pole of the patella.       Impression:     Acute impacted left femoral subcapital fracture.      Degenerative changes as described above.      MACRO:  None      Signed by: Austin Webster 10/7/2023 1:21 AM  Dictation workstation:   [id number]   CT head wo IV contrast [[id number]] Collected: 10/07/23 0038   Order Status: Completed Updated: 10/07/23 0038   Narrative:     Interpreted By:  Eagle Rose,  STUDY:  CT HEAD WO IV CONTRAST;  10/7/2023 12:31 am      INDICATION:  Signs/Symptoms:fall.      COMPARISON:  Noncontrast head CT of 03/12/2023.      ACCESSION NUMBER(S):  [id number]      ORDERING CLINICIAN:  PEPE MORAN      TECHNIQUE:  Noncontrast axial CT scan of head was performed. Angled reformats in  brain and bone windows were generated. The images were reviewed in  bone,  brain, blood and soft tissue windows.      FINDINGS:  CSF Spaces: Redemonstrated ex vacuo dilation of the right lateral  ventricle. Ventricular system is stable in caliber without definite  hydrocephalus. There is no extraaxial fluid collection.      Parenchyma: Moderate to advanced volume loss.  There is  periventricular and subcortical white matter hypoattenuation, most in  keeping with chronic microvascular ischemic change. Stable right  cerebral hemispheric encephalomalacia. The grey-white differentiation  is intact. There is no mass effect or midline shift.  There is no  intracranial hemorrhage.      Calvarium: The calvarium is unremarkable.      Paranasal sinuses and mastoids: Visualized paranasal sinuses and  mastoids are clear.       Impression:     No evidence of acute cortical infarct or intracranial hemorrhage. No  substantial change relative to the prior exam.      MACRO:  None      Signed by: Eagle Rose 10/7/2023 12:36 AM  Dictation workstation:   [id number]                  Medical Problems         Problem List         * (Principal) Closed fracture of left hip, initial encounter (CMS/HCC)     Carotid artery disease (CMS/[facility])     Carotid artery occlusion     Cerebral infarction (CMS/[facility])     Cobalamin deficiency     Depressive disorder     Overview Signed 4/13/2023 11:50 AM by Marni Méndez MA       Note: [facility]           Essential hypertension     Overview Signed 4/13/2023 11:50 AM by Marni Méndez MA       Note: [facility]           Hypothyroidism     Overview Signed 4/13/2023 11:50 AM by Marni Méndez MA       Note: [facility]           Lower extremity edema     Mixed hyperlipidemia     Overview Signed 4/13/2023 11:50 AM by Marni Méndez MA       Note: [facility]           Vitamin D deficiency                  Above medical problems may be reflective of historical medical problems that will resolve and may not related to acute clinical condition/medical problems.     Clinical impression/plan:      Principal Problem:    1.Closed fracture of left hip, initial encounter (CMS/[facility]), Status post left hip arthroplasty, postop day #1.  Active Problems:    2.Cerebral infarction (CMS/[facility])    3.Essential hypertension    4. Hypothyroidism     Plan 10/8 :  -Continue toOptimize pain control.  -  -  -Continue current longitudinal care, secondary stroke modifying therapy for CVA.Resume Plavix when ok with orthopedics service , LMWH for DVT prophylaxis.  -Gentle IV fluid hydration.  -Fall precautions.  --Continue Synthroid  -Blood pressure monitoring.  So far well controlled.  -Resume Ditropan for hyperactive bladder.  -Resume home medication Zoloft for depression.  -OT/PT consultation        Additional care plan: 10/7/23...... Discussed with orthopedic service, recommendation by orthopedic service for CT scan of the hip.The patient is in agreement with orthopedic service recommendation for surgical intervention.  Patient understand the possible risk and complications of surgical intervention.  Geriatric-sensitive perioperative cardiac risk index ( GSCRI): Probability for perioperative myocardial infarction or cardiac arrest 6.5%    Additional plan of care 10/8/2023: Recommend close follow-up with family physician concerning prostate gland enlargement and consideration for digital rectal exam and correlation with PSA, in addition left millimeter bone lesion present at  lower L4 vertebral body nonspecific recommend follow-up with family physicianDiscussed in detail at bedside with patient and patient's wife.          Discharge planning:Given clinical disposition anticipateDischarge barrier awaiting placement to skilled nursing facility versus rehab.  Physical therapy Occupational Therapy if not yet done.     The patient was informed of differential diagnosis , work up , plan of care and possible sequelae of clinical disposition.Patient in agreement with plan of care. Further recommendations forthcoming in accordance with  patient's clinical disposition and response to care.     Dictation performed with assistance of voice recognition device therefore transcription errors are possible.       Addendum: Secure message from patient's nurse concerning low-grade temperature patient asymptomatic.  Tylenol given, will recommend checking UA for completeness.

## 2023-10-08 NOTE — CARE PLAN
The patient's goals for the shift include  pain control    The clinical goals for the shift include  pain control and maintain safety

## 2023-10-09 LAB
ANION GAP SERPL CALC-SCNC: 7 MMOL/L (ref 10–20)
BUN SERPL-MCNC: 19 MG/DL (ref 6–23)
CALCIUM SERPL-MCNC: 8 MG/DL (ref 8.6–10.3)
CHLORIDE SERPL-SCNC: 104 MMOL/L (ref 98–107)
CO2 SERPL-SCNC: 29 MMOL/L (ref 21–32)
CREAT SERPL-MCNC: 0.8 MG/DL (ref 0.5–1.3)
ERYTHROCYTE [DISTWIDTH] IN BLOOD BY AUTOMATED COUNT: 13.5 % (ref 11.5–14.5)
GFR SERPL CREATININE-BSD FRML MDRD: 89 ML/MIN/1.73M*2
GLUCOSE SERPL-MCNC: 101 MG/DL (ref 74–99)
HCT VFR BLD AUTO: 27.9 % (ref 41–52)
HGB BLD-MCNC: 9.1 G/DL (ref 13.5–17.5)
MCH RBC QN AUTO: 30.1 PG (ref 26–34)
MCHC RBC AUTO-ENTMCNC: 32.6 G/DL (ref 32–36)
MCV RBC AUTO: 92 FL (ref 80–100)
NRBC BLD-RTO: 0 /100 WBCS (ref 0–0)
PLATELET # BLD AUTO: 126 X10*3/UL (ref 150–450)
PMV BLD AUTO: 11.3 FL (ref 7.5–11.5)
POTASSIUM SERPL-SCNC: 4 MMOL/L (ref 3.5–5.3)
RBC # BLD AUTO: 3.02 X10*6/UL (ref 4.5–5.9)
SODIUM SERPL-SCNC: 136 MMOL/L (ref 136–145)
WBC # BLD AUTO: 7.5 X10*3/UL (ref 4.4–11.3)

## 2023-10-09 PROCEDURE — 36415 COLL VENOUS BLD VENIPUNCTURE: CPT | Performed by: HOSPITALIST

## 2023-10-09 PROCEDURE — 80048 BASIC METABOLIC PNL TOTAL CA: CPT | Performed by: HOSPITALIST

## 2023-10-09 PROCEDURE — 99232 SBSQ HOSP IP/OBS MODERATE 35: CPT | Performed by: HOSPITALIST

## 2023-10-09 PROCEDURE — 97162 PT EVAL MOD COMPLEX 30 MIN: CPT | Mod: GP

## 2023-10-09 PROCEDURE — 82728 ASSAY OF FERRITIN: CPT | Performed by: PHYSICIAN ASSISTANT

## 2023-10-09 PROCEDURE — 96372 THER/PROPH/DIAG INJ SC/IM: CPT | Performed by: HOSPITALIST

## 2023-10-09 PROCEDURE — 2500000001 HC RX 250 WO HCPCS SELF ADMINISTERED DRUGS (ALT 637 FOR MEDICARE OP): Performed by: STUDENT IN AN ORGANIZED HEALTH CARE EDUCATION/TRAINING PROGRAM

## 2023-10-09 PROCEDURE — 85027 COMPLETE CBC AUTOMATED: CPT | Performed by: HOSPITALIST

## 2023-10-09 PROCEDURE — 82607 VITAMIN B-12: CPT | Performed by: PHYSICIAN ASSISTANT

## 2023-10-09 PROCEDURE — 82746 ASSAY OF FOLIC ACID SERUM: CPT | Performed by: PHYSICIAN ASSISTANT

## 2023-10-09 PROCEDURE — 2500000004 HC RX 250 GENERAL PHARMACY W/ HCPCS (ALT 636 FOR OP/ED): Performed by: INTERNAL MEDICINE

## 2023-10-09 PROCEDURE — 2500000004 HC RX 250 GENERAL PHARMACY W/ HCPCS (ALT 636 FOR OP/ED): Performed by: HOSPITALIST

## 2023-10-09 PROCEDURE — 2580000001 HC RX 258 IV SOLUTIONS: Performed by: INTERNAL MEDICINE

## 2023-10-09 PROCEDURE — 97110 THERAPEUTIC EXERCISES: CPT | Mod: GP

## 2023-10-09 PROCEDURE — 1210000001 HC SEMI-PRIVATE ROOM DAILY

## 2023-10-09 PROCEDURE — 2500000001 HC RX 250 WO HCPCS SELF ADMINISTERED DRUGS (ALT 637 FOR MEDICARE OP): Performed by: INTERNAL MEDICINE

## 2023-10-09 PROCEDURE — 83540 ASSAY OF IRON: CPT | Performed by: PHYSICIAN ASSISTANT

## 2023-10-09 RX ADMIN — OXYBUTYNIN CHLORIDE 5 MG: 5 TABLET ORAL at 21:11

## 2023-10-09 RX ADMIN — ENOXAPARIN SODIUM 40 MG: 40 INJECTION SUBCUTANEOUS at 09:03

## 2023-10-09 RX ADMIN — OXYBUTYNIN CHLORIDE 5 MG: 5 TABLET ORAL at 14:38

## 2023-10-09 RX ADMIN — OXYCODONE HYDROCHLORIDE 5 MG: 5 TABLET ORAL at 17:17

## 2023-10-09 RX ADMIN — OXYCODONE HYDROCHLORIDE 5 MG: 5 TABLET ORAL at 21:11

## 2023-10-09 RX ADMIN — Medication 1.5 L/MIN: at 15:48

## 2023-10-09 RX ADMIN — Medication 2 L/MIN: at 21:06

## 2023-10-09 RX ADMIN — SERTRALINE 100 MG: 100 TABLET, FILM COATED ORAL at 09:03

## 2023-10-09 RX ADMIN — ATORVASTATIN CALCIUM 40 MG: 40 TABLET, FILM COATED ORAL at 09:03

## 2023-10-09 RX ADMIN — OXYCODONE HYDROCHLORIDE 5 MG: 5 TABLET ORAL at 12:12

## 2023-10-09 RX ADMIN — OXYCODONE HYDROCHLORIDE 5 MG: 5 TABLET ORAL at 05:31

## 2023-10-09 RX ADMIN — LEVOTHYROXINE SODIUM 112 MCG: 112 TABLET ORAL at 05:31

## 2023-10-09 RX ADMIN — OXYBUTYNIN CHLORIDE 5 MG: 5 TABLET ORAL at 09:03

## 2023-10-09 RX ADMIN — Medication 3 MG: at 21:11

## 2023-10-09 RX ADMIN — SODIUM CHLORIDE, POTASSIUM CHLORIDE, SODIUM LACTATE AND CALCIUM CHLORIDE 100 ML/HR: 600; 310; 30; 20 INJECTION, SOLUTION INTRAVENOUS at 13:00

## 2023-10-09 RX ADMIN — CYANOCOBALAMIN TAB 1000 MCG 1000 MCG: 1000 TAB at 09:03

## 2023-10-09 RX ADMIN — SODIUM CHLORIDE, POTASSIUM CHLORIDE, SODIUM LACTATE AND CALCIUM CHLORIDE 100 ML/HR: 600; 310; 30; 20 INJECTION, SOLUTION INTRAVENOUS at 23:22

## 2023-10-09 ASSESSMENT — PAIN SCALES - GENERAL
PAINLEVEL_OUTOF10: 4
PAINLEVEL_OUTOF10: 5 - MODERATE PAIN

## 2023-10-09 ASSESSMENT — COGNITIVE AND FUNCTIONAL STATUS - GENERAL
MOVING FROM LYING ON BACK TO SITTING ON SIDE OF FLAT BED WITH BEDRAILS: A LOT
MOVING TO AND FROM BED TO CHAIR: A LOT
WALKING IN HOSPITAL ROOM: A LOT
MOVING FROM LYING ON BACK TO SITTING ON SIDE OF FLAT BED WITH BEDRAILS: TOTAL
DAILY ACTIVITIY SCORE: 12
TURNING FROM BACK TO SIDE WHILE IN FLAT BAD: A LOT
PERSONAL GROOMING: A LITTLE
MOBILITY SCORE: 6
WALKING IN HOSPITAL ROOM: TOTAL
DRESSING REGULAR LOWER BODY CLOTHING: TOTAL
MOVING TO AND FROM BED TO CHAIR: TOTAL
CLIMB 3 TO 5 STEPS WITH RAILING: TOTAL
STANDING UP FROM CHAIR USING ARMS: A LITTLE
DRESSING REGULAR UPPER BODY CLOTHING: A LITTLE
EATING MEALS: A LITTLE
HELP NEEDED FOR BATHING: TOTAL
TOILETING: TOTAL
STANDING UP FROM CHAIR USING ARMS: TOTAL
MOBILITY SCORE: 12
TURNING FROM BACK TO SIDE WHILE IN FLAT BAD: TOTAL
CLIMB 3 TO 5 STEPS WITH RAILING: TOTAL

## 2023-10-09 ASSESSMENT — PAIN - FUNCTIONAL ASSESSMENT
PAIN_FUNCTIONAL_ASSESSMENT: 0-10
PAIN_FUNCTIONAL_ASSESSMENT: FLACC (FACE, LEGS, ACTIVITY, CRY, CONSOLABILITY)

## 2023-10-09 NOTE — PROGRESS NOTES
"Saleem Alba is a 78 y.o. male on day 1 of admission presenting with Closed fracture of left hip, initial encounter (CMS/[facility]).    Subjective   The patient is postop day 1 status post left hip CRPP.  Overall he is doing well.  Pain is controlled to the left hip.  As of this afternoon he had not stood up with physical therapy.       Objective     Physical Exam  Elderly male resting in bed.  Dressing over left hip moderately saturated.  This was changed there is no active bleeding.  A new dressing was placed.  Grossly sensate to review.  Plantarflex and dorsiflexing ankle with 2/5 strength which is at baseline.  Warm and well-perfused distally.    Last Recorded Vitals  Blood pressure 101/58, pulse 63, temperature 38.1 °C (100.6 °F), temperature source Temporal, resp. rate 18, height 1.753 m (5' 9\"), weight 72.6 kg (160 lb), SpO2 92 %.  Intake/Output last 3 Shifts:  I/O last 3 completed shifts:  In: 4481.7 (61.8 mL/kg) [P.O.:450; I.V.:3831.7 (52.8 mL/kg); IV Piggyback:200]  Out: 1200 (16.5 mL/kg) [Urine:1000 (0.4 mL/kg/hr); Blood:200]  Weight: 72.6 kg     Relevant Results      Scheduled medications  sodium chloride 0.9%, , ,   atorvastatin, 40 mg, oral, Daily  cyanocobalamin, 1,000 mcg, oral, Daily  enoxaparin, 40 mg, subcutaneous, Daily  levothyroxine, 112 mcg, oral, Daily  melatonin, 3 mg, oral, Daily  oxybutynin, 5 mg, oral, TID  polyethylene glycol, 17 g, oral, Daily  sertraline, 100 mg, oral, Daily      Continuous medications  lactated Ringer's, 100 mL/hr, Last Rate: 100 mL/hr (10/08/23 0647)      PRN medications  PRN medications: sodium chloride 0.9%, acetaminophen **OR** acetaminophen **OR** acetaminophen, melatonin, ondansetron ODT **OR** ondansetron, oxyCODONE, oxyCODONE, oxygen  Results for orders placed or performed during the hospital encounter of 10/06/23 (from the past 24 hour(s))   CBC   Result Value Ref Range    WBC 9.3 4.4 - 11.3 x10*3/uL    nRBC 0.0 0.0 - 0.0 /100 WBCs    RBC 3.57 (L) 4.50 - 5.90 " x10*6/uL    Hemoglobin 10.6 (L) 13.5 - 17.5 g/dL    Hematocrit 32.5 (L) 41.0 - 52.0 %    MCV 91 80 - 100 fL    MCH 29.7 26.0 - 34.0 pg    MCHC 32.6 32.0 - 36.0 g/dL    RDW 13.4 11.5 - 14.5 %    Platelets 144 (L) 150 - 450 x10*3/uL    MPV 11.2 7.5 - 11.5 fL   Basic Metabolic Panel   Result Value Ref Range    Glucose 124 (H) 74 - 99 mg/dL    Sodium 139 136 - 145 mmol/L    Potassium 3.8 3.5 - 5.3 mmol/L    Chloride 106 98 - 107 mmol/L    Bicarbonate 29 21 - 32 mmol/L    Anion Gap 8 (L) 10 - 20 mmol/L    Urea Nitrogen 20 6 - 23 mg/dL    Creatinine 0.84 0.50 - 1.30 mg/dL    eGFR 88 >60 mL/min/1.73m*2    Calcium 7.9 (L) 8.6 - 10.3 mg/dL   Urinalysis with Reflex Microscopic   Result Value Ref Range    Color, Urine Yellow Straw, Yellow    Appearance, Urine Clear Clear    Specific Gravity, Urine 1.021 1.005 - 1.035    pH, Urine 5.0 5.0, 5.5, 6.0, 6.5, 7.0, 7.5, 8.0    Protein, Urine NEGATIVE NEGATIVE mg/dL    Glucose, Urine NEGATIVE NEGATIVE mg/dL    Blood, Urine NEGATIVE NEGATIVE    Ketones, Urine NEGATIVE NEGATIVE mg/dL    Bilirubin, Urine NEGATIVE NEGATIVE    Urobilinogen, Urine 2.0 (N) <2.0 mg/dL    Nitrite, Urine NEGATIVE NEGATIVE    Leukocyte Esterase, Urine NEGATIVE NEGATIVE                            Assessment/Plan   Principal Problem:    Closed fracture of left hip, initial encounter (CMS/[facility])  Active Problems:    Cerebral infarction (CMS/[facility])    Essential hypertension    Hypothyroidism    CAD (coronary artery disease)    CVA (cerebral vascular accident) (CMS/[facility])    Anticoagulant long-term use    The patient is postop day 1 status post left hip CRPP.  - 50% partial weightbearing to the left hip.  - Lovenox for DVT prophylaxis.  - We will continue to monitor drainage to the left hip.  - Pain control as needed.  - Disposition pending clinical course.  He will likely require discharge to rehab due to a surgical procedure in addition to the need for help at home.         Donnie Meek MD MPH

## 2023-10-09 NOTE — PROGRESS NOTES
10/09/23 1009   Discharge Planning   Living Arrangements Spouse/significant other   Support Systems Spouse/significant other   Assistance Needed Assist with Walker   Type of Residence Private residence   Home or Post Acute Services Post acute facilities (Rehab/SNF/etc)   Type of Post Acute Facility Services Skilled nursing   Patient expects to be discharged to: SNF     PT/ OT Pending. Patient unsure about SNF.     Spoke to Patient and wife regarding SNF recommendation, they are agreeable. Patient has been recommended for Skilled Nursing Facility. Provided skilled nursing list to patient from [facility] directory that includes facilities that are within  [facility], as well as meeting patient's medical needs, and are in-network for patient's insurance; while also in discharge geographic area patient prefers, and identifies each facilities CMS star rating. Patient and wife will review list.

## 2023-10-09 NOTE — PROGRESS NOTES
Saleem Flores [id number]   Service: Internal Medicine / Hospitalist Date of service 10/9/23         .                    History Of Present Illness  Saleem Alba is a 78 y.o. male presenting with past medical history as below that includes stroke with residual left-sided weakness who is mostly wheelchair-bound but able to use a walker with difficulty at baseline.  He presented to the ER after a fall at home described as tripped and fell while trying to get out of the dining table to the room.  He did hit his head but no loss of consciousness reported.  He started to have pain in the left hip area.  He denies pain at other locations.  ER course: Patient was awake, alert, oriented.  He is in no acute distress.  He does have dysarthria and left-sided weakness from prior stroke.  His vitals were stable.  There is mild leukocytosis 12.3 likely reactive, and chemistry in general is within acceptable range.  CT head reported negative for acute intracranial pathology.  Chest x-ray did not reveal rib fractures.  X-ray of the left hip and left femur did confirm impacted left subcapital femoral fracture.  Orthopedic surgery consulted by ER provider.  Admission is for official orthopedic consultation, rehab, and further management.        Past Medical History  He has a past medical history of Personal history of other diseases of the digestive system, Personal history of other endocrine, nutritional and metabolic disease, Personal history of other mental and behavioral disorders, and Personal history of transient ischemic attack (TIA), and cerebral infarction without residual deficits.     Surgical History  He has a past surgical history that includes Other surgical history (04/13/2022); MR angio neck wo IV contrast (6/7/2022); MR angio neck wo IV contrast (3/13/2023); and MR angio head wo IV contrast (3/13/2023).     Social History  He reports that he quit smoking about 16 years ago. His smoking use included cigarettes. He  smoked an average of 1 pack per day. He has never used smokeless tobacco. He reports that he does not drink alcohol and does not use drugs.                  Above information gathered from admission H&P.           Subjective data:  Patient seen and examined, lab data and diagnostics reviewed.  No reported: acute symptomatology at the time of evaluation including but not limited to :chest pain, dyspnea, constipation, abdominal pain, dysuria , nausea, vomiting ,headache, new focal weakness, diaphoresis,fever, chills, syncope,presyncope or palpitations. The patient however did endorsed left hip pain.     10/8......................Patient seen and examined in presence of family members patient's granddaughter, patient's son and patient's wife.  Patient reported overall he is doing fairly well did ask for analgesics therapy at hand of my interview and exam.  No reported : Dyspnea, cough, chest pain, fevers, chills, headaches, nausea, vomiting or palpitations.    10/9.................Patient reports that overall he is doing very well today no reported headaches, chest pains, fevers, chills, nausea or vomiting patient wife is present at the bedside he disclosed that his left hip pain is manageable and reports good response to narcotics.        Review of Systems:   Review of system otherwise negative if not aforementioned above in subjective.     Objective     Physical Exam    Constitutional:       Appearance: Patient appeared in no acute cardiopulmonary distress.     Comments: Patient alert and oriented to person place time and situation.  HENT:      Head: Normocephalic and atraumatic.Trachea midline      Nose: Nose normal. No congestion or rhinorrhea.     Mouth: Mucous membranes Moist.   Eyes:      Extraocular Movements: Extraocular movements intact.      Pupils: Pupils are equal, round, and reactive to light.      Comments: No scleral icterus or conjunctival injection appreciated.   Cardiovascular:      Rate and Rhythm:  Normal rate and regular rhythm. No clicks rubs or gallops, normal S1 and S2.No peripheral stigmata of endocarditis appreciated.     Pulmonary:      Effort: Pulmonary effort is normal.      Breath sounds: Lungs clear to auscultation globally  Abdominal:      General: Abdomen soft, nontender, active bowel sounds, no involuntary guarding or rebound tenderness appreciated.     Comments: None   Musculoskeletal:         General: No Swelling present.   Comment:Patient does have limited range of motion of the left upper and lower extremity with some contractures due to hemiplegia from prior stroke and superimposed left hip fracture status post arthroplasty. Dressing in place signs of old blood but clinically doubt underlying hematoma.  Lymphadenopathy:      No appreciable palpable lymphadenopathy  Skin:     General: Skin is warm.      Coloration:  No jaundice     Findings: No abnormal appearing skin rashes or lesions that appeared acute noted on unclothed area of the skin..   Neurological:      General: No  new focal sensory or motor deficits appreciated, no meningeal signs or dysmetria noted. Cranial Nerves: Cranial nerves II to XII appearing grossly intact.  Comment: ( chronic) Patient does have left facial droop, left eyelid drop, and weakness of both left upper and lower extremities motor 1/5 and mild expressive aphasia .          Psychiatric:         The patient appears to be displaying normal mood and affect at the time of evaluation.     Labs:             Lab Results   Component Value Date     GLUCOSE 124 (H) 10/08/2023     CALCIUM 7.9 (L) 10/08/2023      10/08/2023     K 3.8 10/08/2023     CO2 29 10/08/2023      10/08/2023     BUN 20 10/08/2023     CREATININE 0.84 10/08/2023            Lab Results   Component Value Date     WBC 9.3 10/08/2023     HGB 10.6 (L) 10/08/2023     HCT 32.5 (L) 10/08/2023     MCV 91 10/08/2023      (L) 10/08/2023    .  Lab Results   Component Value Date    GLUCOSE 101 (H)  10/09/2023    CALCIUM 8.0 (L) 10/09/2023     10/09/2023    K 4.0 10/09/2023    CO2 29 10/09/2023     10/09/2023    BUN 19 10/09/2023    CREATININE 0.80 10/09/2023      Lab Results   Component Value Date    WBC 7.5 10/09/2023    HGB 9.1 (L) 10/09/2023    HCT 27.9 (L) 10/09/2023    MCV 92 10/09/2023     (L) 10/09/2023      [unfilled]   [unfilled]   No results found for the last 90 days.   X-rays/ Images     Most recent CT  CT scan.       Radiology Results (last 21 days)     Procedure Component Value Units Date/Time   FL less than 1 hour [[id number]] Resulted: 10/07/23 1432   Order Status: Sent Updated: 10/07/23 1543   CT hip left wo IV contrast [[id number]] Collected: 10/07/23 1252   Order Status: Completed Updated: 10/07/23 1252   Narrative:     Interpreted By:  Kris Wagner,  STUDY:  CT HIP LEFT WO IV CONTRAST;  10/7/2023 11:38 am      INDICATION:  Signs/Symptoms:evaluate L hip fracture.      COMPARISON:  Plain film examination same day reporting impacted left femoral  subcapital fracture.      ACCESSION NUMBER(S):  [id number]      ORDERING CLINICIAN:  VEL HAYNES      TECHNIQUE:  Helical CT of the pelvis was performed without IV contrast. Axial,  coronal, and sagittal reconstructions were performed and reviewed. In  addition, oblique reconstructions of the bilateral hips and 3D  reconstructions of the hips and pelvis were performed.      FINDINGS:  11 mm bone lesion present of the lower left L4 vertebral body  nonspecific. This likely reflects a Schmorl's node. Other less likely  possibilities include hemangioma, plasmacytoma, lytic metastasis  among other causes.      There is a nondisplaced fracture of the left femoral neck. Minimal  impaction. No distraction or butterfly fragments. Small joint  effusion.      Prostate gland is enlarged. Consider digital rectal examination and  correlation with PSA.          .       Impression:     Nondisplaced mildly impacted fracture of  the left femoral neck.      Probable benign lesion of the L4 vertebral body.          MACRO:  None      Signed by: Kris Wagner 10/7/2023 12:50 PM        Radiology Results (last 21 days)     Procedure Component Value Units Date/Time   XR chest 1 view [[id number]] Collected: 10/07/23 0205   Order Status: Completed Updated: 10/07/23 0206   Narrative:     Interpreted By:  Austin Webster,  STUDY:  XR CHEST 1 VIEW;  10/7/2023 2:02 am      INDICATION:  Hypertension status post fall      COMPARISON:  Chest x-ray 03/12/2023      ACCESSION NUMBER(S):  [id number]      ORDERING CLINICIAN:  PEPE MORAN      FINDINGS:  Overlying leads noted.      CARDIOMEDIASTINAL SILHOUETTE:  Cardiomediastinal silhouette is stable in size and configuration.  Atherosclerotic calcification of the aorta.      LUNGS:  There is subtle increased airspace opacity overlying the left mid  lung which may relate to atelectasis versus developing airspace  disease. Right lung is clear. No consolidation, effusion or  pneumothorax      ABDOMEN:  No remarkable upper abdominal findings.      BONES:  Multilevel degenerative changes of the spine.       Impression:     Subtle increased airspace opacity in the left mid lung may relate to  atelectasis versus developing airspace disease. Correlate clinically.      MACRO:  None      Signed by: Austin Webster 10/7/2023 2:04 AM  Dictation workstation:   [id number]   XR hip left 2 or 3 views [[id number]] Collected: 10/07/23 0122   Order Status: Completed Updated: 10/07/23 0127   Narrative:     Interpreted By:  Austin Webster,  STUDY:  XR HIP LEFT 2 OR 3 VIEWS; XR FEMUR LEFT 2+ VIEWS; ;  10/7/2023 1:03 am      INDICATION:  Signs/Symptoms:fall.      COMPARISON:  Pelvic x-rays 3/12/2023.      ACCESSION NUMBER(S):  [id number]; [id number]      ORDERING CLINICIAN:  PEPE MORAN      FINDINGS:  AP view of the pelvis as well as AP and lateral views of the left hip  and femur are obtained.      Generalized diffuse  osteopenia. There is an acute mildly impacted  subcapital femoral fracture. The femoral head articulates with the  acetabulum. Mild bilateral hip osteoarthrosis. Degenerative changes  of the lower lumbar spine, SI joints and symphysis pubis. Left knee  osteoarthrosis. No significant knee joint effusion. Enthesopathy  changes noted along the superior pole of the patella.       Impression:     Acute impacted left femoral subcapital fracture.      Degenerative changes as described above.      MACRO:  None      Signed by: Austin Webster 10/7/2023 1:21 AM  Dictation workstation:   [id number]   XR femur left 2+ views [[id number]] Collected: 10/07/23 0122   Order Status: Completed Updated: 10/07/23 0127   Narrative:     Interpreted By:  Austin Webster,  STUDY:  XR HIP LEFT 2 OR 3 VIEWS; XR FEMUR LEFT 2+ VIEWS; ;  10/7/2023 1:03 am      INDICATION:  Signs/Symptoms:fall.      COMPARISON:  Pelvic x-rays 3/12/2023.      ACCESSION NUMBER(S):  [id number]; [id number]      ORDERING CLINICIAN:  PEPE MORAN      FINDINGS:  AP view of the pelvis as well as AP and lateral views of the left hip  and femur are obtained.      Generalized diffuse osteopenia. There is an acute mildly impacted  subcapital femoral fracture. The femoral head articulates with the  acetabulum. Mild bilateral hip osteoarthrosis. Degenerative changes  of the lower lumbar spine, SI joints and symphysis pubis. Left knee  osteoarthrosis. No significant knee joint effusion. Enthesopathy  changes noted along the superior pole of the patella.       Impression:     Acute impacted left femoral subcapital fracture.      Degenerative changes as described above.      MACRO:  None      Signed by: Austin Webster 10/7/2023 1:21 AM  Dictation workstation:   [id number]   CT head wo IV contrast [[id number]] Collected: 10/07/23 0038   Order Status: Completed Updated: 10/07/23 0038   Narrative:     Interpreted By:  Eagle Rose,  STUDY:  CT HEAD WO IV CONTRAST;  10/7/2023  12:31 am      INDICATION:  Signs/Symptoms:fall.      COMPARISON:  Noncontrast head CT of 03/12/2023.      ACCESSION NUMBER(S):  [id number]      ORDERING CLINICIAN:  PEPE MORAN      TECHNIQUE:  Noncontrast axial CT scan of head was performed. Angled reformats in  brain and bone windows were generated. The images were reviewed in  bone, brain, blood and soft tissue windows.      FINDINGS:  CSF Spaces: Redemonstrated ex vacuo dilation of the right lateral  ventricle. Ventricular system is stable in caliber without definite  hydrocephalus. There is no extraaxial fluid collection.      Parenchyma: Moderate to advanced volume loss.  There is  periventricular and subcortical white matter hypoattenuation, most in  keeping with chronic microvascular ischemic change. Stable right  cerebral hemispheric encephalomalacia. The grey-white differentiation  is intact. There is no mass effect or midline shift.  There is no  intracranial hemorrhage.      Calvarium: The calvarium is unremarkable.      Paranasal sinuses and mastoids: Visualized paranasal sinuses and  mastoids are clear.       Impression:     No evidence of acute cortical infarct or intracranial hemorrhage. No  substantial change relative to the prior exam.      MACRO:  None      Signed by: Eagle Rose 10/7/2023 12:36 AM  Dictation workstation:   [id number]                  Medical Problems         Problem List         * (Principal) Closed fracture of left hip, initial encounter (CMS/[facility])     Carotid artery disease (CMS/[facility])     Carotid artery occlusion     Cerebral infarction (CMS/[facility])     Cobalamin deficiency     Depressive disorder     Overview Signed 4/13/2023 11:50 AM by Marni Méndez MA       Note: [facility]           Essential hypertension     Overview Signed 4/13/2023 11:50 AM by Marni Méndez MA       Note: [facility]           Hypothyroidism     Overview Signed 4/13/2023 11:50 AM by Marni Méndez MA       Note: [facility]           Lower extremity edema      Mixed hyperlipidemia     Overview Signed 4/13/2023 11:50 AM by Marni Méndez MA       Note: [facility]           Vitamin D deficiency                  Above medical problems may be reflective of historical medical problems that will resolve and may not related to acute clinical condition/medical problems.     Clinical impression/plan:     Principal Problem:    1.Closed fracture of left hip, initial encounter (CMS/[facility]), Geriatric-sensitive perioperative cardiac risk index ( GSCRI): Probability for perioperative myocardial infarction or cardiac arrest 6.5% Status post left hip arthroplasty, postop day #2.  Active Problems:    2.Cerebral infarction (CMS/[facility])    3.Essential hypertension    4. Hypothyroidism     Plan 10/9 :  -Continue to Optimize pain control.  -Continue current longitudinal care, secondary stroke modifying therapy for CVA.Resume Plavix when ok with orthopedics service , LMWH for DVT prophylaxis.    -Fall precautions.  --Continue Synthroid  -Blood pressure monitoring.  So far well controlled.  -Resume Ditropan for hyperactive bladder.  -Resume home medication Zoloft for depression.  -OT/PT consultation        Additional care plan: 10/7/23...... Discussed with orthopedic service, recommendation by orthopedic service for CT scan of the hip.The patient is in agreement with orthopedic service recommendation for surgical intervention.  Patient understand the possible risk and complications of surgical intervention.     Additional plan of care 10/9/2023: Recommend close follow-up with family physician concerning prostate gland enlargement and consideration for digital rectal exam and correlation with PSA, in addition left millimeter bone lesion present at  lower L4 vertebral body nonspecific recommend follow-up with family physicianDiscussed in detail at bedside with patient and patient's wife.    Discharge planning: Patient appeared clinically stable for discharge to skilled nursing facility discharge barrier:  Authorization to skilled nursing facility.        The patient was informed of differential diagnosis , work up , plan of care and possible sequelae of clinical disposition.Patient in agreement with plan of care. Further recommendations forthcoming in accordance with patient's clinical disposition and response to care.     Dictation performed with assistance of voice recognition device therefore transcription errors are possible.         Addendum: Secure message from patient's nurse concerning low-grade temperature patient asymptomatic 10/8/2023.  Tylenol given, will recommend checking UA for completeness............Negative for signs of acute infection, urobilinogen at 2 but urine bilirubin normal.

## 2023-10-09 NOTE — CARE PLAN
Problem: Fall/Injury  Goal: Use assistive devices by end of the shift  Outcome: Progressing  Goal: Pace activities to prevent fatigue by end of the shift  Outcome: Progressing     Problem: Meds/Post-op Pain  Goal: Pain controlled to tolerate pain level  Outcome: Progressing  Goal: Tolerates prescribed medication  Outcome: Progressing     Problem: DVT/VTE Prevention/Activity  Goal: No decrease in circulation/sensation  Outcome: Progressing  Goal: Prevent skin breakdown  Outcome: Progressing  Goal: Return to preop oxygenation status  Outcome: Progressing

## 2023-10-09 NOTE — DOCUMENTATION CLARIFICATION NOTE
PATIENT:               PATRICK STEEN  ACCT #:                  [id number]  MRN:                       [id number]  :                       1945  ADMIT DATE:       10/6/2023 11:35 PM  DISCH DATE:  RESPONDING PROVIDER #:        [id number]          PROVIDER RESPONSE TEXT:    Hemoglobin decrease from 13.5 > 9.1 not requiring treatment or evaluation    CDI QUERY TEXT:    [facility]_Abnormal Studies        Instruction:    Based on your assessment of the patient and the clinical information, please provide the requested documentation by clicking on the appropriate radio button and enter any additional information if prompted.    Question: Is there a diagnosis indicative of the lab values or image study    When answering this query, please exercise your independent professional judgment. The fact that a question is being asked, does not imply that any particular answer is desired or expected.    The patient's clinical indicators include:  Clinical Information:  77yo male admitted with mechanical fall/tripped and required surgical treatment of L hip fracture.  PMH of HTN, GERD, mixed HLD, depression, exsmoker, hypothyroidism, CAD, vit D Def, CVA with residual left-sided weakness and L facial droop on Plavix.    Clinical Indicators: Labs current stay:  10/07/23 02:33  HEMOGLOBIN: 13.5  HEMATOCRIT: 40.1    10/08/23 05:36  HEMOGLOBIN: 10.6  HEMATOCRIT: 32.5    10/09/23 05:19  HEMOGLOBIN: 9.1  HEMATOCRIT: 27.9      Treatment: Lab monitoring, IVF, Type and screen.    Risk Factors: Plavix, Hip fracture, postop CRPP Left hip with ebl 200ml.  Options provided:  -- Acute blood loss anemia is clinically significant and required treatment/monitoring  -- Hemoglobin decrease from 13.5 > 9.1 not requiring treatment or evaluation  -- Other - I will add my own diagnosis  -- Refer to Clinical Documentation Reviewer    Query created by: Gem Mancuso on 10/9/2023 4:09 PM      Electronically signed by:  PRERNA IRAHETA DO 10/9/2023 7:52  PM

## 2023-10-09 NOTE — CARE PLAN
Problem: Mobility  Goal: BED MOBILITY  Description: ROLL IN BED, SUPINE<>SIT MOD X1  Outcome: Not Progressing  Goal: STRENGTH  Description: 20+ REPS EX INCREASING STRENGTH TO PROGRESS OOB ACTIVITIES  Outcome: Not Progressing     Problem: Transfers  Goal: SIT<>STAND  Description: SIT<>STAND MIN X2 FWW PWB LLE IMPROVING STABILITY TO PROGRESS TO CHAIR AND GAIT ASSESSMENT  Outcome: Not Progressing

## 2023-10-09 NOTE — PROGRESS NOTES
Physical Therapy    Physical Therapy Evaluation    Patient Name: Saleem Alba  MRN: [id number]  Today's Date: 10/9/2023   Time Calculation  Start Time: 1030  Stop Time: 1110  Time Calculation (min): 40 min    Assessment/Plan   PT Assessment  PT Assessment Results: Decreased strength, Decreased range of motion, Decreased endurance, Impaired balance, Decreased mobility, Impaired judgement, Decreased safety awareness, Impaired hearing, Impaired tone, Impaired sensation, Pain  Rehab Prognosis: Fair  Evaluation/Treatment Tolerance: Patient limited by fatigue, Patient limited by pain  Medical Staff Made Aware: Yes  Strengths: Support of extended family/friends  Barriers to Participation: Ability to acquire knowledge  End of Session Communication: Bedside nurse, PCT/NA/CTA  Assessment Comment: PT. COMPLETED EX IN SUPINE 5 REPS MIN A RLE AND LLE MAX  HEEL SLIDE, ACITVE AP, MAX ON  LAP,  SEATED EOB 5 REPS R HIP FLEXION/KICKS; PT. HIGH FALLRISK NEEDS MAX X2 FOR MOBITY WILL NEED SKILLED REHAB ON DISCH  End of Session Patient Position: Bed, 3 rail up, Alarm on  IP OR SWING BED PT PLAN  Inpatient or Swing Bed: Inpatient  PT Plan  Treatment/Interventions: Bed mobility, Transfer training, Strengthening, Wheelchair management  PT Plan: Skilled PT  PT Frequency: 5 times per week  PT Discharge Recommendations: Moderate intensity level of continued care  Equipment Recommended upon Discharge:  (TBD)  PT Recommended Transfer Status: Assist x2 (TO EOB)      Subjective   General Visit Information:  General  Reason for Referral: gait training, i mpaired mobiltiy, recent surg ABDELRAHMAN  Referred By: Jose G  Past Medical History Relevant to Rehab: FELL INJURING L HIP DX: L HIP FX, ORIF WITH NAILING 10/7; HX: CVA WTIH L WEAEKNESS, HTN, DEPRESISON, TOBACCOUSE, HYPOTHYROID  Family/Caregiver Present: No  Prior to Session Communication: Bedside nurse, PCT/NA/CTA  Patient Position Received: Bed, 3 rail up, Alarm on (PT. HOLDS HI LLE IN HIP AND KNEE  FLEXION, IV)  Preferred Learning Style: verbal, kinesthetic  General Comment: RN  CAME IN ROOM TO TELL THERAPIST THAT PT. IS PWB 50% LLE  Home Living:  Home Living  Home Living Comments: LIVES WITH WIFE IN 1 LEVEL HOEM, 1 STEP-NEEDS A , FWW AMB SHORT DISTANCES W/ A, SPENDS MOT OF THE ALBARO IN WC,  SPONGE BATHE W/ A, WIFE DOEWS CHORES, AND TRANSPORTS       Precautions:  Precautions  LE Weight Bearing Status:  (PWB LLE 50%)  Medical Precautions: Fall precautions  Precautions Comment: PT. HAS EXPRESSIVE APHASIA         Objective   Pain:  Pain Assessment  Pain Assessment: FLACC (Face, Legs, Activity, Cry, Consolability)  Pain Score:  (PT. GRIMACES WITH LLE  MOBILITY)  Cognition:  Cognition  Overall Cognitive Status: Within Functional Limits  Orientation Level: Unable to assess (EXPRESIVE APHASIA)  Problem Solving: Exceptions to WFL  Complex Functional Tasks: Impaired  Impulsive: Moderately  Processing Speed: Delayed                Activity Tolerance  Endurance: Tolerates 10 - 20 min exercise with multiple rests  Early Mobility/Exercise Safety Screen:  (PANUFL LLE)    Sensation  Sensation Comment: NO C/O    Strength  Strength Comments: ROM WFL RUEA ND RLE, LSHOULDER 0-90 DEGREES, L ELBOW WFL, L HNAD IN FELXION CONTRACTURE CAN BE PASSIVELY OPENED TO PLACE ON FW, LLE WFL, BUT PAINFUL TO MOVE    Perception  Motor Planning: Hand over hand to sequence tasks      Coordination  Coordination Comment:  (DECREASED DUR TO PREVIOUS CVA W/ L WEAKNESS)    Postural Control  Posture Comment: SLIGHT FORWARD HEAD    Static Sitting Balance  Static Sitting-Comment/Number of Minutes: SAT EOB IWTH MIN A FOR TOTAL 12 MIN  Dynamic Sitting Balance  Dynamic Sitting-Comments: NEEDS MOD A FOR DYNAMIC BALCNE    Static Standing Balance  Static Standing-Comment/Number of Minutes: POOR FOR 30 SEC X3, FWW, MAX X2 A  Dynamic Standing Balance  Dynamic Standing-Comments: NOT ABLE TO ATTEMPT         Bed Mobility  Bed Mobility: Yes  Bed Mobility 1  Bed  Mobility Comments 1: MAX X2 SUPINE<.SIT, UP IN BED MAX X2, BED PAD    Transfers  Transfer: Yes  Transfer 1  Trials/Comments 1: SIT<.STND 3REPS, FWW MAX X2 A, STOOD AT EOB FOR 30 SEC EACH TIME, MOD RETRO LEAN NEEDS FEET BLOCKED AS RLE TENDS TO SLIDE FORWARD WHEN HE PUSHES UP TO STAND, LLE ADDUCTS INFRONT OF THE RLE SO THERAPIST HAS TO HOLD IT IN NEUTRAL POSITION                      Outcome Measures:  [facility] Basic Mobility  Turning from your back to your side while in a flat bed without using bedrails: Total  Moving from lying on your back to sitting on the side of a flat bed without using bedrails: Total  Moving to and from bed to chair (including a wheelchair): Total  Standing up from a chair using your arms (e.g. wheelchair or bedside chair): Total  To walk in hospital room: Total  Climbing 3-5 steps with railing: Total  Basic Mobility - Total Score: 6    Encounter Problems       Encounter Problems (Active)       Mobility       BED MOBILITY (Not Progressing)       Start:  10/09/23    Expected End:  10/30/23       ROLL IN BED, SUPINE<>SIT MOD X1         STRENGTH (Not Progressing)       Start:  10/09/23    Expected End:  10/30/23       20+ REPS EX INCREASING STRENGTH TO PROGRESS OOB ACTIVITIES            Transfers       SIT<>STAND (Not Progressing)       Start:  10/09/23    Expected End:  10/30/23       SIT<>STAND MIN X2 FWW PWB LLE IMPROVING STABILITY TO PROGRESS TO CHAIR AND GAIT ASSESSMENT                Education Documentation  Precautions, taught by Meggan Rodriguez PT at 10/9/2023  1:21 PM.  Learner: Patient  Readiness: Acceptance  Method: Explanation  Response: Verbalizes Understanding    Mobility Training, taught by Meggan Rodriguez PT at 10/9/2023  1:21 PM.  Learner: Patient  Readiness: Acceptance  Method: Explanation  Response: Verbalizes Understanding    Education Comments  No comments found.

## 2023-10-10 ENCOUNTER — TELEPHONE (OUTPATIENT)
Dept: PRIMARY CARE | Facility: CLINIC | Age: 78
End: 2023-10-10
Payer: MEDICARE

## 2023-10-10 ENCOUNTER — APPOINTMENT (OUTPATIENT)
Dept: CARDIOLOGY | Facility: HOSPITAL | Age: 78
DRG: 480 | End: 2023-10-10
Payer: MEDICARE

## 2023-10-10 DIAGNOSIS — E78.2 MIXED HYPERLIPIDEMIA: ICD-10-CM

## 2023-10-10 DIAGNOSIS — E03.9 HYPOTHYROIDISM, UNSPECIFIED TYPE: ICD-10-CM

## 2023-10-10 DIAGNOSIS — S72.009A CLOSED FRACTURE OF HIP, UNSPECIFIED LATERALITY, INITIAL ENCOUNTER (MULTI): Primary | ICD-10-CM

## 2023-10-10 DIAGNOSIS — F32.A DEPRESSIVE DISORDER: ICD-10-CM

## 2023-10-10 DIAGNOSIS — R32 URINARY INCONTINENCE, UNSPECIFIED TYPE: ICD-10-CM

## 2023-10-10 DIAGNOSIS — I73.9 PERIPHERAL VASCULAR DISEASE, UNSPECIFIED (CMS-HCC): ICD-10-CM

## 2023-10-10 DIAGNOSIS — E55.9 VITAMIN D DEFICIENCY: ICD-10-CM

## 2023-10-10 PROBLEM — N40.0 ENLARGED PROSTATE: Status: ACTIVE | Noted: 2023-10-10

## 2023-10-10 PROBLEM — D62 ACUTE BLOOD LOSS ANEMIA: Status: ACTIVE | Noted: 2023-10-10

## 2023-10-10 LAB
ATRIAL RATE: 63 BPM
ERYTHROCYTE [DISTWIDTH] IN BLOOD BY AUTOMATED COUNT: 13.4 % (ref 11.5–14.5)
FERRITIN SERPL-MCNC: 373 NG/ML (ref 20–300)
FOLATE SERPL-MCNC: 7.9 NG/ML
HCT VFR BLD AUTO: 26 % (ref 41–52)
HCT VFR BLD AUTO: 26.2 % (ref 41–52)
HGB BLD-MCNC: 8.6 G/DL (ref 13.5–17.5)
HGB BLD-MCNC: 8.6 G/DL (ref 13.5–17.5)
IRON SATN MFR SERPL: 9 % (ref 25–45)
IRON SERPL-MCNC: 14 UG/DL (ref 35–150)
MCH RBC QN AUTO: 30.5 PG (ref 26–34)
MCHC RBC AUTO-ENTMCNC: 32.8 G/DL (ref 32–36)
MCV RBC AUTO: 93 FL (ref 80–100)
NRBC BLD-RTO: 0 /100 WBCS (ref 0–0)
P AXIS: 0 DEGREES
PLATELET # BLD AUTO: 133 X10*3/UL (ref 150–450)
PMV BLD AUTO: 11.6 FL (ref 7.5–11.5)
PR INTERVAL: 179 MS
Q ONSET: 249 MS
QRS COUNT: 11 BEATS
QRS DURATION: 144 MS
QT INTERVAL: 437 MS
QTC CALCULATION(BAZETT): 451 MS
QTC FREDERICIA: 446 MS
R AXIS: -17 DEGREES
RBC # BLD AUTO: 2.82 X10*6/UL (ref 4.5–5.9)
T AXIS: 33 DEGREES
T OFFSET: 468 MS
TIBC SERPL-MCNC: 154 UG/DL (ref 240–445)
UIBC SERPL-MCNC: 140 UG/DL (ref 110–370)
VENTRICULAR RATE: 64 BPM
VIT B12 SERPL-MCNC: 272 PG/ML (ref 211–911)
WBC # BLD AUTO: 6.1 X10*3/UL (ref 4.4–11.3)

## 2023-10-10 PROCEDURE — 96372 THER/PROPH/DIAG INJ SC/IM: CPT | Performed by: HOSPITALIST

## 2023-10-10 PROCEDURE — 2500000001 HC RX 250 WO HCPCS SELF ADMINISTERED DRUGS (ALT 637 FOR MEDICARE OP): Performed by: INTERNAL MEDICINE

## 2023-10-10 PROCEDURE — 93005 ELECTROCARDIOGRAM TRACING: CPT

## 2023-10-10 PROCEDURE — 2500000004 HC RX 250 GENERAL PHARMACY W/ HCPCS (ALT 636 FOR OP/ED): Performed by: HOSPITALIST

## 2023-10-10 PROCEDURE — 36415 COLL VENOUS BLD VENIPUNCTURE: CPT | Performed by: PHYSICIAN ASSISTANT

## 2023-10-10 PROCEDURE — 85027 COMPLETE CBC AUTOMATED: CPT | Performed by: HOSPITALIST

## 2023-10-10 PROCEDURE — 1210000001 HC SEMI-PRIVATE ROOM DAILY

## 2023-10-10 PROCEDURE — 85014 HEMATOCRIT: CPT | Performed by: PHYSICIAN ASSISTANT

## 2023-10-10 PROCEDURE — 2500000004 HC RX 250 GENERAL PHARMACY W/ HCPCS (ALT 636 FOR OP/ED): Performed by: INTERNAL MEDICINE

## 2023-10-10 PROCEDURE — 2500000001 HC RX 250 WO HCPCS SELF ADMINISTERED DRUGS (ALT 637 FOR MEDICARE OP): Performed by: PHYSICIAN ASSISTANT

## 2023-10-10 PROCEDURE — 99233 SBSQ HOSP IP/OBS HIGH 50: CPT | Performed by: PHYSICIAN ASSISTANT

## 2023-10-10 PROCEDURE — 93010 ELECTROCARDIOGRAM REPORT: CPT | Performed by: INTERNAL MEDICINE

## 2023-10-10 PROCEDURE — 36415 COLL VENOUS BLD VENIPUNCTURE: CPT | Performed by: HOSPITALIST

## 2023-10-10 RX ORDER — ERGOCALCIFEROL 1.25 MG/1
50000 CAPSULE ORAL
Qty: 12 CAPSULE | Refills: 1 | Status: CANCELLED | OUTPATIENT
Start: 2023-10-10 | End: 2024-04-07

## 2023-10-10 RX ORDER — OXYBUTYNIN CHLORIDE 5 MG/1
5 TABLET ORAL 3 TIMES DAILY
Qty: 90 TABLET | Refills: 0 | Status: SHIPPED | OUTPATIENT
Start: 2023-10-10 | End: 2023-10-31

## 2023-10-10 RX ORDER — ATORVASTATIN CALCIUM 40 MG/1
40 TABLET, FILM COATED ORAL DAILY
Qty: 30 TABLET | Refills: 0 | Status: SHIPPED | OUTPATIENT
Start: 2023-10-10 | End: 2023-10-31

## 2023-10-10 RX ORDER — ERYTHROMYCIN 5 MG/G
1 OINTMENT OPHTHALMIC NIGHTLY
Status: DISCONTINUED | OUTPATIENT
Start: 2023-10-10 | End: 2023-10-12 | Stop reason: HOSPADM

## 2023-10-10 RX ORDER — CLOPIDOGREL BISULFATE 75 MG/1
75 TABLET ORAL DAILY
Qty: 30 TABLET | Refills: 0 | Status: SHIPPED | OUTPATIENT
Start: 2023-10-10 | End: 2023-10-31

## 2023-10-10 RX ORDER — LEVOTHYROXINE SODIUM 112 UG/1
TABLET ORAL
Qty: 70 TABLET | Refills: 0 | Status: SHIPPED | OUTPATIENT
Start: 2023-10-10 | End: 2023-11-28

## 2023-10-10 RX ORDER — SERTRALINE HYDROCHLORIDE 100 MG/1
100 TABLET, FILM COATED ORAL DAILY
Qty: 30 TABLET | Refills: 0 | Status: SHIPPED | OUTPATIENT
Start: 2023-10-10 | End: 2023-10-31

## 2023-10-10 RX ORDER — FOLIC ACID 1 MG/1
1 TABLET ORAL DAILY
Status: DISCONTINUED | OUTPATIENT
Start: 2023-10-10 | End: 2023-10-12 | Stop reason: HOSPADM

## 2023-10-10 RX ORDER — ERGOCALCIFEROL 1.25 MG/1
1250 CAPSULE ORAL
Status: DISCONTINUED | OUTPATIENT
Start: 2023-10-11 | End: 2023-10-12 | Stop reason: HOSPADM

## 2023-10-10 RX ADMIN — Medication 3 MG: at 21:05

## 2023-10-10 RX ADMIN — SERTRALINE 100 MG: 100 TABLET, FILM COATED ORAL at 10:42

## 2023-10-10 RX ADMIN — CYANOCOBALAMIN TAB 1000 MCG 1000 MCG: 1000 TAB at 10:42

## 2023-10-10 RX ADMIN — ATORVASTATIN CALCIUM 40 MG: 40 TABLET, FILM COATED ORAL at 21:04

## 2023-10-10 RX ADMIN — OXYBUTYNIN CHLORIDE 5 MG: 5 TABLET ORAL at 10:41

## 2023-10-10 RX ADMIN — OXYBUTYNIN CHLORIDE 5 MG: 5 TABLET ORAL at 15:07

## 2023-10-10 RX ADMIN — OXYBUTYNIN CHLORIDE 5 MG: 5 TABLET ORAL at 21:04

## 2023-10-10 RX ADMIN — ERYTHROMYCIN 1 CM: 5 OINTMENT OPHTHALMIC at 21:05

## 2023-10-10 RX ADMIN — ENOXAPARIN SODIUM 40 MG: 40 INJECTION SUBCUTANEOUS at 10:41

## 2023-10-10 RX ADMIN — LEVOTHYROXINE SODIUM 112 MCG: 112 TABLET ORAL at 06:28

## 2023-10-10 RX ADMIN — Medication 3 L/MIN: at 08:05

## 2023-10-10 RX ADMIN — POLYETHYLENE GLYCOL 3350 17 G: 17 POWDER, FOR SOLUTION ORAL at 10:41

## 2023-10-10 RX ADMIN — FOLIC ACID 1 MG: 1 TABLET ORAL at 10:42

## 2023-10-10 ASSESSMENT — ENCOUNTER SYMPTOMS
BRUISES/BLEEDS EASILY: 0
CONSTIPATION: 0
SORE THROAT: 0
SHORTNESS OF BREATH: 0
APPETITE CHANGE: 0
BLOOD IN STOOL: 0
BACK PAIN: 0
TROUBLE SWALLOWING: 0
FEVER: 0
PALPITATIONS: 0
WEAKNESS: 0
JOINT SWELLING: 0
HEADACHES: 0
FREQUENCY: 0
WHEEZING: 0
HEMATURIA: 0
DIZZINESS: 0
LIGHT-HEADEDNESS: 0
FLANK PAIN: 0
HALLUCINATIONS: 0
ABDOMINAL PAIN: 0
WOUND: 0
DYSURIA: 0
CHEST TIGHTNESS: 0
EYE PAIN: 0
NUMBNESS: 0
NAUSEA: 0
COUGH: 0
CHILLS: 0
FACIAL SWELLING: 0
DIARRHEA: 0
FATIGUE: 0
DIAPHORESIS: 0
VOMITING: 0

## 2023-10-10 ASSESSMENT — PAIN SCALES - GENERAL
PAINLEVEL_OUTOF10: 0 - NO PAIN

## 2023-10-10 ASSESSMENT — COGNITIVE AND FUNCTIONAL STATUS - GENERAL
MOVING FROM LYING ON BACK TO SITTING ON SIDE OF FLAT BED WITH BEDRAILS: A LOT
EATING MEALS: A LITTLE
DRESSING REGULAR UPPER BODY CLOTHING: A LOT
CLIMB 3 TO 5 STEPS WITH RAILING: TOTAL
MOVING TO AND FROM BED TO CHAIR: A LOT
DAILY ACTIVITIY SCORE: 10
TURNING FROM BACK TO SIDE WHILE IN FLAT BAD: A LOT
PERSONAL GROOMING: A LOT
DRESSING REGULAR LOWER BODY CLOTHING: TOTAL
HELP NEEDED FOR BATHING: TOTAL
STANDING UP FROM CHAIR USING ARMS: A LOT
MOBILITY SCORE: 11
WALKING IN HOSPITAL ROOM: A LOT
MOVING FROM LYING ON BACK TO SITTING ON SIDE OF FLAT BED WITH BEDRAILS: A LITTLE
TOILETING: TOTAL

## 2023-10-10 ASSESSMENT — PAIN - FUNCTIONAL ASSESSMENT
PAIN_FUNCTIONAL_ASSESSMENT: 0-10

## 2023-10-10 NOTE — PROGRESS NOTES
Physical Therapy                 Therapy Communication Note    Patient Name: Saleem Alba  MRN: [id number]  Today's Date: 10/10/2023     Discipline: Physical Therapy    Attempted 2x, first att pt getting washed up second att pt with case management and visitor present.

## 2023-10-10 NOTE — ASSESSMENT & PLAN NOTE
"S/p status post left hip CRPP 10/7/23  PT/OT- dc planning SNF  Orthopedics consult- recommentations are as follows:  \"-50% weight bearing LLE.  -Recommend Lovenox x6 weeks for DVT prophylaxis.  -Staples removed 2 weeks after surgery.  This should be around 10/21/2023.  This can be completed at his rehab facility or at ortho office depending on where the patient is at that time.  -Follow-up at ortho office in [place] on 10/20/2023 for evaluation of wound and x-ray.  This is pending his discharge from his rehab facility.  The scheduling number is [phone].\"  "

## 2023-10-10 NOTE — PROGRESS NOTES
Physical Therapy                 Therapy Communication Note    Patient Name: Saleem Alba  MRN: [id number]  Today's Date: 10/10/2023     Discipline: Physical Therapy    Comment: pt currently eating lunch

## 2023-10-10 NOTE — PROGRESS NOTES
Saleem Alba is a 78 y.o. male on day 3 of admission presenting with Closed fracture of left hip, initial encounter (CMS/[facility]).      Subjective   Saleem Alba is a 78 y.o. 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 + Head strike (CTH neg). CXR neg. X-ray of the left hip and left femur did confirm impacted left subcapital femoral fracture. Discharge planning: Plan discharge to skilled nursing facility awaiting referral/authorization. Abnormal findings concerning vertebral body and prostate discussed in detail with patient and patient's wife recommend close follow-up with family physician.  10/10/23: No acute events overnight. Vitals stable, 97% 3L. Hgb 8.6 (9.1, 10.6, 13.5). Check B12, iron, folate         Review of Systems   Constitutional:  Negative for appetite change, chills, diaphoresis, fatigue and fever.   HENT:  Negative for congestion, ear pain, facial swelling, hearing loss, nosebleeds, sore throat, tinnitus and trouble swallowing.    Eyes:  Negative for pain.   Respiratory:  Negative for cough, chest tightness, shortness of breath and wheezing.    Cardiovascular:  Negative for chest pain, palpitations and leg swelling.   Gastrointestinal:  Negative for abdominal pain, blood in stool, constipation, diarrhea, nausea and vomiting.   Genitourinary:  Negative for dysuria, flank pain, frequency, hematuria and urgency.   Musculoskeletal:  Negative for back pain and joint swelling.        Pain controlled well   Skin:  Negative for rash and wound.   Neurological:  Negative for dizziness, syncope, weakness, light-headedness, numbness and headaches.   Hematological:  Does not bruise/bleed easily.   Psychiatric/Behavioral:  Negative for behavioral problems, hallucinations and suicidal ideas.           Objective     Last Recorded Vitals  /56 (BP  Location: Left arm, Patient Position: Lying)   Pulse 56   Temp 37.3 °C (99.1 °F) (Temporal)   Resp 19   Wt 72.6 kg (160 lb)   SpO2 94%     Image Results  CT hip left wo IV contrast    Result Date: 10/7/2023  Interpreted By:  Kris Wagner, STUDY: CT HIP LEFT WO IV CONTRAST;  10/7/2023 11:38 am   INDICATION: Signs/Symptoms:evaluate L hip fracture.   COMPARISON: Plain film examination same day reporting impacted left femoral subcapital fracture.   ACCESSION NUMBER(S): [id number]   ORDERING CLINICIAN: VEL HAYNES   TECHNIQUE: Helical CT of the pelvis was performed without IV contrast. Axial, coronal, and sagittal reconstructions were performed and reviewed. In addition, oblique reconstructions of the bilateral hips and 3D reconstructions of the hips and pelvis were performed.   FINDINGS: 11 mm bone lesion present of the lower left L4 vertebral body nonspecific. This likely reflects a Schmorl's node. Other less likely possibilities include hemangioma, plasmacytoma, lytic metastasis among other causes.   There is a nondisplaced fracture of the left femoral neck. Minimal impaction. No distraction or butterfly fragments. Small joint effusion.   Prostate gland is enlarged. Consider digital rectal examination and correlation with PSA.     .       Nondisplaced mildly impacted fracture of the left femoral neck.   Probable benign lesion of the L4 vertebral body.     MACRO: None   Signed by: Kris Wagner 10/7/2023 12:50 PM Dictation workstation:   [id number]    XR chest 1 view    Result Date: 10/7/2023  Interpreted By:  Austin Webster, STUDY: XR CHEST 1 VIEW;  10/7/2023 2:02 am   INDICATION: Hypertension status post fall   COMPARISON: Chest x-ray 03/12/2023   ACCESSION NUMBER(S): [id number]   ORDERING CLINICIAN: PEPE MORAN   FINDINGS: Overlying leads noted.   CARDIOMEDIASTINAL SILHOUETTE: Cardiomediastinal silhouette is stable in size and configuration. Atherosclerotic calcification of the aorta.    LUNGS: There is subtle increased airspace opacity overlying the left mid lung which may relate to atelectasis versus developing airspace disease. Right lung is clear. No consolidation, effusion or pneumothorax   ABDOMEN: No remarkable upper abdominal findings.   BONES: Multilevel degenerative changes of the spine.       Subtle increased airspace opacity in the left mid lung may relate to atelectasis versus developing airspace disease. Correlate clinically.   MACRO: None   Signed by: Austin Webster 10/7/2023 2:04 AM Dictation workstation:   [id number]    XR hip left 2 or 3 views    Result Date: 10/7/2023  Interpreted By:  Austin Webster, STUDY: XR HIP LEFT 2 OR 3 VIEWS; XR FEMUR LEFT 2+ VIEWS; ;  10/7/2023 1:03 am   INDICATION: Signs/Symptoms:fall.   COMPARISON: Pelvic x-rays 3/12/2023.   ACCESSION NUMBER(S): [id number]; [id number]   ORDERING CLINICIAN: PEPE MORAN   FINDINGS: AP view of the pelvis as well as AP and lateral views of the left hip and femur are obtained.   Generalized diffuse osteopenia. There is an acute mildly impacted subcapital femoral fracture. The femoral head articulates with the acetabulum. Mild bilateral hip osteoarthrosis. Degenerative changes of the lower lumbar spine, SI joints and symphysis pubis. Left knee osteoarthrosis. No significant knee joint effusion. Enthesopathy changes noted along the superior pole of the patella.       Acute impacted left femoral subcapital fracture.   Degenerative changes as described above.   MACRO: None   Signed by: Austin Webster 10/7/2023 1:21 AM Dictation workstation:   [id number]    XR femur left 2+ views    Result Date: 10/7/2023  Interpreted By:  Austin Webster, STUDY: XR HIP LEFT 2 OR 3 VIEWS; XR FEMUR LEFT 2+ VIEWS; ;  10/7/2023 1:03 am   INDICATION: Signs/Symptoms:fall.   COMPARISON: Pelvic x-rays 3/12/2023.   ACCESSION NUMBER(S): [id number]; [id number]   ORDERING CLINICIAN: PEPE MORAN   FINDINGS: AP view of the pelvis as well as AP  and lateral views of the left hip and femur are obtained.   Generalized diffuse osteopenia. There is an acute mildly impacted subcapital femoral fracture. The femoral head articulates with the acetabulum. Mild bilateral hip osteoarthrosis. Degenerative changes of the lower lumbar spine, SI joints and symphysis pubis. Left knee osteoarthrosis. No significant knee joint effusion. Enthesopathy changes noted along the superior pole of the patella.       Acute impacted left femoral subcapital fracture.   Degenerative changes as described above.   MACRO: None   Signed by: Austin Webster 10/7/2023 1:21 AM Dictation workstation:   [id number]    CT head wo IV contrast    Result Date: 10/7/2023  Interpreted By:  Eagle Rose, STUDY: CT HEAD WO IV CONTRAST;  10/7/2023 12:31 am   INDICATION: Signs/Symptoms:fall.   COMPARISON: Noncontrast head CT of 03/12/2023.   ACCESSION NUMBER(S): [id number]   ORDERING CLINICIAN: PEPE MORAN   TECHNIQUE: Noncontrast axial CT scan of head was performed. Angled reformats in brain and bone windows were generated. The images were reviewed in bone, brain, blood and soft tissue windows.   FINDINGS: CSF Spaces: Redemonstrated ex vacuo dilation of the right lateral ventricle. Ventricular system is stable in caliber without definite hydrocephalus. There is no extraaxial fluid collection.   Parenchyma: Moderate to advanced volume loss.  There is periventricular and subcortical white matter hypoattenuation, most in keeping with chronic microvascular ischemic change. Stable right cerebral hemispheric encephalomalacia. The grey-white differentiation is intact. There is no mass effect or midline shift.  There is no intracranial hemorrhage.   Calvarium: The calvarium is unremarkable.   Paranasal sinuses and mastoids: Visualized paranasal sinuses and mastoids are clear.       No evidence of acute cortical infarct or intracranial hemorrhage. No substantial change relative to the prior exam.   MACRO:  None   Signed by: Eagle Rose 10/7/2023 12:36 AM Dictation workstation:   [id number]       Lab Results  Results for orders placed or performed during the hospital encounter of 10/06/23 (from the past 24 hour(s))   CBC   Result Value Ref Range    WBC 6.1 4.4 - 11.3 x10*3/uL    nRBC 0.0 0.0 - 0.0 /100 WBCs    RBC 2.82 (L) 4.50 - 5.90 x10*6/uL    Hemoglobin 8.6 (L) 13.5 - 17.5 g/dL    Hematocrit 26.2 (L) 41.0 - 52.0 %    MCV 93 80 - 100 fL    MCH 30.5 26.0 - 34.0 pg    MCHC 32.8 32.0 - 36.0 g/dL    RDW 13.4 11.5 - 14.5 %    Platelets 133 (L) 150 - 450 x10*3/uL    MPV 11.6 (H) 7.5 - 11.5 fL        Medications  Scheduled medications:  atorvastatin, 40 mg, oral, Daily  cyanocobalamin, 1,000 mcg, oral, Daily  [START ON 10/11/2023] ergocalciferol, 1,250 mcg, oral, Weekly  erythromycin, 1 cm, Both Eyes, Nightly  folic acid, 1 mg, oral, Daily  levothyroxine, 112 mcg, oral, Daily  melatonin, 3 mg, oral, Daily  oxybutynin, 5 mg, oral, TID  polyethylene glycol, 17 g, oral, Daily  sertraline, 100 mg, oral, Daily      Continuous medications:     PRN medications:  PRN medications: acetaminophen **OR** acetaminophen **OR** acetaminophen, melatonin, ondansetron ODT **OR** ondansetron, oxyCODONE, oxyCODONE, oxygen     Physical Exam  Constitutional:       General: He is not in acute distress.     Appearance: Normal appearance.   HENT:      Head: Normocephalic and atraumatic.      Right Ear: External ear normal.      Left Ear: External ear normal.      Nose: Nose normal.      Mouth/Throat:      Mouth: Mucous membranes are moist.      Pharynx: Oropharynx is clear.   Eyes:      Extraocular Movements: Extraocular movements intact.      Pupils: Pupils are equal, round, and reactive to light.      Comments: Left ectropion with erythema of lid  Purulent discharge from right eye   Cardiovascular:      Rate and Rhythm: Normal rate and regular rhythm.      Pulses: Normal pulses.      Heart sounds: Normal heart sounds.   Pulmonary:      Effort:  Pulmonary effort is normal. No respiratory distress.      Breath sounds: Normal breath sounds. No wheezing, rhonchi or rales.   Abdominal:      General: Abdomen is flat. Bowel sounds are normal.      Palpations: Abdomen is soft.      Tenderness: There is no abdominal tenderness. There is no right CVA tenderness, left CVA tenderness, guarding or rebound.   Musculoskeletal:         General: No swelling. Normal range of motion.      Cervical back: Normal range of motion and neck supple.   Skin:     General: Skin is warm and dry.      Capillary Refill: Capillary refill takes less than 2 seconds.      Findings: No lesion or rash.   Neurological:      General: No focal deficit present.      Mental Status: He is alert and oriented to person, place, and time. Mental status is at baseline.      Comments: limited range of motion of the left upper and lower extremity with some contractures due to hemiplegia from prior stroke and superimposed left hip fracture status post arthroplasty  Patient does have left facial droop, left eyelid drop, and weakness of both left upper and lower extremities motor 1/5 and mild expressive aphasia   Psychiatric:         Mood and Affect: Mood normal.         Behavior: Behavior normal.                  Code Status  Full Code     Assessment/Plan        Acute blood loss anemia  Secondary to fracture  Monitor H/H  Check Iron/B12/Folate  Hold SQ Lovenox    Cerebral infarction (CMS/[facility])  Baseline L weakness and dysarthria    Enlarged prostate  Needs outpatient follow up with PCP for PSA and SUZY    Hypothyroidism  Continue home medications    Vitamin D deficiency  Continue home medicaiton    Closed fracture of left hip, initial encounter (CMS/[facility])  S/p status post left hip CRPP 10/7/23  PT/OT- dc planning SNF        DVT ppx: Hold Lovenox given anemia       Barbara Martinez PA-C

## 2023-10-10 NOTE — TELEPHONE ENCOUNTER
Pt had to cancel OV due to being in hospital for broken hip  Ok for RF's?  Also needing handicap placard  Please advise. Thanks. JW

## 2023-10-10 NOTE — TELEPHONE ENCOUNTER
Patient fell and broke his hip is in the hospital needs Handicap Placard his has  also is needing refill on his medicine had to cancel his appt   atorvastatin (Lipitor) 40 mg tablet   clopidogrel (Plavix) 75 mg tablet   ergocalciferol (Vitamin D-2) 1.25 MG   levothyroxine (Synthroid, Levoxyl) 112 mcg   oxybutynin (Ditropan) 5 mg tablet   sertraline (Zoloft) 100 mg tablet     Please send to mail order [facility]

## 2023-10-10 NOTE — CARE PLAN
The patient's goals for the shift include    Problem: Fall/Injury  Goal: Use assistive devices by end of the shift  Outcome: Progressing  Goal: Pace activities to prevent fatigue by end of the shift  Outcome: Progressing     Problem: Skin  Goal: Participates in plan/prevention/treatment measures  Outcome: Progressing  Goal: Prevent/manage excess moisture  Outcome: Progressing  Goal: Prevent/minimize sheer/friction injuries  Outcome: Progressing  Goal: Promote/optimize nutrition  Outcome: Progressing  Goal: Promote skin healing  Outcome: Progressing     Problem: Meds/Post-op Pain  Goal: Pain controlled to tolerate pain level  Outcome: Progressing  Goal: Tolerates prescribed medication  Outcome: Progressing     Problem: DVT/VTE Prevention/Activity  Goal: No decrease in circulation/sensation  Outcome: Progressing  Goal: Prevent skin breakdown  Outcome: Progressing  Goal: Return to preop oxygenation status  Outcome: Progressing  Goal: Tolerates optimal activity  Outcome: Progressing  Goal: Increase self care and/or family involvement in 24 hrs.  Outcome: Progressing     Problem: Wound care/infection prevention  Goal: No signs of infection in 24 hrs.  Outcome: Progressing  Goal: No unexpected bleeding from incision this shift  Outcome: Progressing       No s/s of distress noted. Safety maintained throughout shift, call  light in reach. See assessment and mar. Weaned to 1 liter NC. AM labs ordered. IVF stopped today. External catheter maintained.

## 2023-10-11 ENCOUNTER — APPOINTMENT (OUTPATIENT)
Dept: RADIOLOGY | Facility: HOSPITAL | Age: 78
DRG: 480 | End: 2023-10-11
Payer: MEDICARE

## 2023-10-11 PROBLEM — J96.01 ACUTE HYPOXIC RESPIRATORY FAILURE (MULTI): Status: ACTIVE | Noted: 2023-10-11

## 2023-10-11 PROBLEM — E46 PROTEIN CALORIE MALNUTRITION (MULTI): Status: ACTIVE | Noted: 2023-10-11

## 2023-10-11 LAB
ANION GAP SERPL CALC-SCNC: 8 MMOL/L (ref 10–20)
BUN SERPL-MCNC: 19 MG/DL (ref 6–23)
CALCIUM SERPL-MCNC: 7.9 MG/DL (ref 8.6–10.3)
CHLORIDE SERPL-SCNC: 104 MMOL/L (ref 98–107)
CO2 SERPL-SCNC: 28 MMOL/L (ref 21–32)
CREAT SERPL-MCNC: 0.76 MG/DL (ref 0.5–1.3)
ERYTHROCYTE [DISTWIDTH] IN BLOOD BY AUTOMATED COUNT: 13.3 % (ref 11.5–14.5)
GFR SERPL CREATININE-BSD FRML MDRD: >90 ML/MIN/1.73M*2
GLUCOSE SERPL-MCNC: 102 MG/DL (ref 74–99)
HCT VFR BLD AUTO: 24.6 % (ref 41–52)
HGB BLD-MCNC: 8.1 G/DL (ref 13.5–17.5)
MAGNESIUM SERPL-MCNC: 1.74 MG/DL (ref 1.6–2.4)
MCH RBC QN AUTO: 29.9 PG (ref 26–34)
MCHC RBC AUTO-ENTMCNC: 32.9 G/DL (ref 32–36)
MCV RBC AUTO: 91 FL (ref 80–100)
NRBC BLD-RTO: 0 /100 WBCS (ref 0–0)
PLATELET # BLD AUTO: 155 X10*3/UL (ref 150–450)
PMV BLD AUTO: 11.7 FL (ref 7.5–11.5)
POTASSIUM SERPL-SCNC: 4 MMOL/L (ref 3.5–5.3)
RBC # BLD AUTO: 2.71 X10*6/UL (ref 4.5–5.9)
SODIUM SERPL-SCNC: 136 MMOL/L (ref 136–145)
WBC # BLD AUTO: 5.7 X10*3/UL (ref 4.4–11.3)

## 2023-10-11 PROCEDURE — 71046 X-RAY EXAM CHEST 2 VIEWS: CPT | Performed by: RADIOLOGY

## 2023-10-11 PROCEDURE — 1210000001 HC SEMI-PRIVATE ROOM DAILY

## 2023-10-11 PROCEDURE — 83735 ASSAY OF MAGNESIUM: CPT | Performed by: PHYSICIAN ASSISTANT

## 2023-10-11 PROCEDURE — 71046 X-RAY EXAM CHEST 2 VIEWS: CPT | Mod: FY

## 2023-10-11 PROCEDURE — 2500000001 HC RX 250 WO HCPCS SELF ADMINISTERED DRUGS (ALT 637 FOR MEDICARE OP): Performed by: INTERNAL MEDICINE

## 2023-10-11 PROCEDURE — 97530 THERAPEUTIC ACTIVITIES: CPT | Mod: GP,CQ,59

## 2023-10-11 PROCEDURE — 2500000004 HC RX 250 GENERAL PHARMACY W/ HCPCS (ALT 636 FOR OP/ED): Performed by: INTERNAL MEDICINE

## 2023-10-11 PROCEDURE — 99024 POSTOP FOLLOW-UP VISIT: CPT | Performed by: STUDENT IN AN ORGANIZED HEALTH CARE EDUCATION/TRAINING PROGRAM

## 2023-10-11 PROCEDURE — 99233 SBSQ HOSP IP/OBS HIGH 50: CPT | Performed by: PHYSICIAN ASSISTANT

## 2023-10-11 PROCEDURE — 2500000001 HC RX 250 WO HCPCS SELF ADMINISTERED DRUGS (ALT 637 FOR MEDICARE OP): Performed by: PHYSICIAN ASSISTANT

## 2023-10-11 PROCEDURE — 36415 COLL VENOUS BLD VENIPUNCTURE: CPT | Performed by: PHYSICIAN ASSISTANT

## 2023-10-11 PROCEDURE — 85027 COMPLETE CBC AUTOMATED: CPT | Performed by: PHYSICIAN ASSISTANT

## 2023-10-11 PROCEDURE — 2500000004 HC RX 250 GENERAL PHARMACY W/ HCPCS (ALT 636 FOR OP/ED): Performed by: PHYSICIAN ASSISTANT

## 2023-10-11 PROCEDURE — 80048 BASIC METABOLIC PNL TOTAL CA: CPT | Performed by: PHYSICIAN ASSISTANT

## 2023-10-11 PROCEDURE — 97110 THERAPEUTIC EXERCISES: CPT | Mod: GP,CQ

## 2023-10-11 RX ORDER — AMOXICILLIN 250 MG
1 CAPSULE ORAL 2 TIMES DAILY
Status: DISCONTINUED | OUTPATIENT
Start: 2023-10-11 | End: 2023-10-12 | Stop reason: HOSPADM

## 2023-10-11 RX ADMIN — ERYTHROMYCIN 1 CM: 5 OINTMENT OPHTHALMIC at 20:30

## 2023-10-11 RX ADMIN — Medication 6 MG: at 20:30

## 2023-10-11 RX ADMIN — ATORVASTATIN CALCIUM 40 MG: 40 TABLET, FILM COATED ORAL at 20:30

## 2023-10-11 RX ADMIN — ERGOCALCIFEROL 1250 MCG: 1.25 CAPSULE ORAL at 08:40

## 2023-10-11 RX ADMIN — FOLIC ACID 1 MG: 1 TABLET ORAL at 08:41

## 2023-10-11 RX ADMIN — CYANOCOBALAMIN TAB 1000 MCG 1000 MCG: 1000 TAB at 08:41

## 2023-10-11 RX ADMIN — OXYBUTYNIN CHLORIDE 5 MG: 5 TABLET ORAL at 08:40

## 2023-10-11 RX ADMIN — ACETAMINOPHEN 650 MG: 325 TABLET ORAL at 01:25

## 2023-10-11 RX ADMIN — SENNOSIDES AND DOCUSATE SODIUM 1 TABLET: 50; 8.6 TABLET ORAL at 12:29

## 2023-10-11 RX ADMIN — POLYETHYLENE GLYCOL 3350 17 G: 17 POWDER, FOR SOLUTION ORAL at 08:41

## 2023-10-11 RX ADMIN — SERTRALINE 100 MG: 100 TABLET, FILM COATED ORAL at 08:41

## 2023-10-11 RX ADMIN — LEVOTHYROXINE SODIUM 112 MCG: 112 TABLET ORAL at 05:47

## 2023-10-11 RX ADMIN — OXYBUTYNIN CHLORIDE 5 MG: 5 TABLET ORAL at 20:30

## 2023-10-11 RX ADMIN — OXYBUTYNIN CHLORIDE 5 MG: 5 TABLET ORAL at 15:30

## 2023-10-11 RX ADMIN — IRON SUCROSE 300 MG: 20 INJECTION, SOLUTION INTRAVENOUS at 12:30

## 2023-10-11 ASSESSMENT — ENCOUNTER SYMPTOMS
HEMATURIA: 0
BLOOD IN STOOL: 0
HALLUCINATIONS: 0
CHEST TIGHTNESS: 0
DIARRHEA: 0
HEADACHES: 0
APPETITE CHANGE: 0
TROUBLE SWALLOWING: 0
SHORTNESS OF BREATH: 0
FACIAL SWELLING: 0
SORE THROAT: 0
BACK PAIN: 0
WHEEZING: 0
VOMITING: 0
PALPITATIONS: 0
COUGH: 0
FREQUENCY: 0
FATIGUE: 0
FLANK PAIN: 0
JOINT SWELLING: 0
ABDOMINAL PAIN: 0
LIGHT-HEADEDNESS: 0
FEVER: 0
DIZZINESS: 0
DYSURIA: 0
WEAKNESS: 0
CHILLS: 0
BRUISES/BLEEDS EASILY: 0
EYE PAIN: 0
WOUND: 0
CONSTIPATION: 0
NAUSEA: 0
NUMBNESS: 0
DIAPHORESIS: 0

## 2023-10-11 ASSESSMENT — COGNITIVE AND FUNCTIONAL STATUS - GENERAL
TOILETING: TOTAL
PERSONAL GROOMING: A LOT
DRESSING REGULAR LOWER BODY CLOTHING: TOTAL
MOVING FROM LYING ON BACK TO SITTING ON SIDE OF FLAT BED WITH BEDRAILS: A LOT
WALKING IN HOSPITAL ROOM: TOTAL
TURNING FROM BACK TO SIDE WHILE IN FLAT BAD: TOTAL
TURNING FROM BACK TO SIDE WHILE IN FLAT BAD: A LOT
STANDING UP FROM CHAIR USING ARMS: TOTAL
HELP NEEDED FOR BATHING: TOTAL
MOVING TO AND FROM BED TO CHAIR: A LOT
MOBILITY SCORE: 11
MOVING TO AND FROM BED TO CHAIR: TOTAL
WALKING IN HOSPITAL ROOM: A LOT
MOVING FROM LYING ON BACK TO SITTING ON SIDE OF FLAT BED WITH BEDRAILS: A LOT
CLIMB 3 TO 5 STEPS WITH RAILING: TOTAL
MOBILITY SCORE: 7
DRESSING REGULAR UPPER BODY CLOTHING: A LOT
STANDING UP FROM CHAIR USING ARMS: A LOT
EATING MEALS: A LITTLE
DAILY ACTIVITIY SCORE: 10
CLIMB 3 TO 5 STEPS WITH RAILING: TOTAL

## 2023-10-11 ASSESSMENT — PAIN SCALES - GENERAL: PAINLEVEL_OUTOF10: 2

## 2023-10-11 ASSESSMENT — PAIN - FUNCTIONAL ASSESSMENT: PAIN_FUNCTIONAL_ASSESSMENT: 0-10

## 2023-10-11 NOTE — PROGRESS NOTES
The patient is postop day 4 status post left hip CRPP.    50% weight bearing left lower extremity.  -Recommend Lovenox x6 weeks for DVT prophylaxis.  -He will require his staples removed 2 weeks after surgery.  This should be around 10/21/2023.  This can be completed at his rehab facility or at my office depending on where the patient is at that time.  -The patient should plan to follow-up at my office in [place] on 10/20/2023 for evaluation of wound and x-ray.  This is pending his discharge from his rehab facility.  The scheduling number is [phone].    Please contact me directly with any further questions or concerns.    Donnie Meek MD, MPH

## 2023-10-11 NOTE — PROGRESS NOTES
Saleem Alba is a 78 y.o. male on day 4 of admission presenting with Closed fracture of left hip, initial encounter (CMS/[facility]).      Subjective   Saleem Alba is a 78 y.o. 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 + Head strike (CTH neg). CXR neg. X-ray of the left hip and left femur did confirm impacted left subcapital femoral fracture. Discharge planning: Plan discharge to skilled nursing facility awaiting referral/authorization. Abnormal findings concerning vertebral body and prostate discussed in detail with patient and patient's wife recommend close follow-up with family physician. CXR with bilateral blunting of costophrenic angles.   10/11/23: No acute events overnight. Vitals stable, 89% RA- placed back on 1L, tmax 100.4. CXR with blunting bilaterally, no pneumonia or pneumothorax, encourage IS. UA negative for UTI. Hgb 8.1 (8.6, 9.1, 10.6, 13.5). B12 272- supplement. Iron/TIBC low- supplement IV iron. Add Senna-S BID         Review of Systems   Constitutional:  Negative for appetite change, chills, diaphoresis, fatigue and fever.   HENT:  Negative for congestion, ear pain, facial swelling, hearing loss, nosebleeds, sore throat, tinnitus and trouble swallowing.    Eyes:  Negative for pain.   Respiratory:  Negative for cough, chest tightness, shortness of breath and wheezing.    Cardiovascular:  Negative for chest pain, palpitations and leg swelling.   Gastrointestinal:  Negative for abdominal pain, blood in stool, constipation, diarrhea, nausea and vomiting.   Genitourinary:  Negative for dysuria, flank pain, frequency, hematuria and urgency.   Musculoskeletal:  Negative for back pain and joint swelling.        Pain controlled well   Skin:  Negative for rash and wound.   Neurological:  Negative for dizziness, syncope, weakness, light-headedness,  numbness and headaches.   Hematological:  Does not bruise/bleed easily.   Psychiatric/Behavioral:  Negative for behavioral problems, hallucinations and suicidal ideas.           Objective     Last Recorded Vitals  /61   Pulse 57   Temp 37.1 °C (98.7 °F) (Temporal)   Resp 18   Wt 72.6 kg (160 lb)   SpO2 96%     Image Results  CT hip left wo IV contrast    Result Date: 10/7/2023  Interpreted By:  Kris Wagner, STUDY: CT HIP LEFT WO IV CONTRAST;  10/7/2023 11:38 am   INDICATION: Signs/Symptoms:evaluate L hip fracture.   COMPARISON: Plain film examination same day reporting impacted left femoral subcapital fracture.   ACCESSION NUMBER(S): [id number]   ORDERING CLINICIAN: VEL HAYNES   TECHNIQUE: Helical CT of the pelvis was performed without IV contrast. Axial, coronal, and sagittal reconstructions were performed and reviewed. In addition, oblique reconstructions of the bilateral hips and 3D reconstructions of the hips and pelvis were performed.   FINDINGS: 11 mm bone lesion present of the lower left L4 vertebral body nonspecific. This likely reflects a Schmorl's node. Other less likely possibilities include hemangioma, plasmacytoma, lytic metastasis among other causes.   There is a nondisplaced fracture of the left femoral neck. Minimal impaction. No distraction or butterfly fragments. Small joint effusion.   Prostate gland is enlarged. Consider digital rectal examination and correlation with PSA.     .       Nondisplaced mildly impacted fracture of the left femoral neck.   Probable benign lesion of the L4 vertebral body.     MACRO: None   Signed by: Kris Wagner 10/7/2023 12:50 PM Dictation workstation:   [id number]    XR chest 1 view    Result Date: 10/7/2023  Interpreted By:  Austin Webster, STUDY: XR CHEST 1 VIEW;  10/7/2023 2:02 am   INDICATION: Hypertension status post fall   COMPARISON: Chest x-ray 03/12/2023   ACCESSION NUMBER(S): [id number]   ORDERING CLINICIAN: PEPE MORAN    FINDINGS: Overlying leads noted.   CARDIOMEDIASTINAL SILHOUETTE: Cardiomediastinal silhouette is stable in size and configuration. Atherosclerotic calcification of the aorta.   LUNGS: There is subtle increased airspace opacity overlying the left mid lung which may relate to atelectasis versus developing airspace disease. Right lung is clear. No consolidation, effusion or pneumothorax   ABDOMEN: No remarkable upper abdominal findings.   BONES: Multilevel degenerative changes of the spine.       Subtle increased airspace opacity in the left mid lung may relate to atelectasis versus developing airspace disease. Correlate clinically.   MACRO: None   Signed by: Austin Webster 10/7/2023 2:04 AM Dictation workstation:   [id number]    XR hip left 2 or 3 views    Result Date: 10/7/2023  Interpreted By:  Austin Webster, STUDY: XR HIP LEFT 2 OR 3 VIEWS; XR FEMUR LEFT 2+ VIEWS; ;  10/7/2023 1:03 am   INDICATION: Signs/Symptoms:fall.   COMPARISON: Pelvic x-rays 3/12/2023.   ACCESSION NUMBER(S): [id number]; [id number]   ORDERING CLINICIAN: PEPE MORAN   FINDINGS: AP view of the pelvis as well as AP and lateral views of the left hip and femur are obtained.   Generalized diffuse osteopenia. There is an acute mildly impacted subcapital femoral fracture. The femoral head articulates with the acetabulum. Mild bilateral hip osteoarthrosis. Degenerative changes of the lower lumbar spine, SI joints and symphysis pubis. Left knee osteoarthrosis. No significant knee joint effusion. Enthesopathy changes noted along the superior pole of the patella.       Acute impacted left femoral subcapital fracture.   Degenerative changes as described above.   MACRO: None   Signed by: Austin Webster 10/7/2023 1:21 AM Dictation workstation:   [id number]    XR femur left 2+ views    Result Date: 10/7/2023  Interpreted By:  Austin Webster, STUDY: XR HIP LEFT 2 OR 3 VIEWS; XR FEMUR LEFT 2+ VIEWS; ;  10/7/2023 1:03 am   INDICATION:  Signs/Symptoms:fall.   COMPARISON: Pelvic x-rays 3/12/2023.   ACCESSION NUMBER(S): [id number]; [id number]   ORDERING CLINICIAN: PEPE MORAN   FINDINGS: AP view of the pelvis as well as AP and lateral views of the left hip and femur are obtained.   Generalized diffuse osteopenia. There is an acute mildly impacted subcapital femoral fracture. The femoral head articulates with the acetabulum. Mild bilateral hip osteoarthrosis. Degenerative changes of the lower lumbar spine, SI joints and symphysis pubis. Left knee osteoarthrosis. No significant knee joint effusion. Enthesopathy changes noted along the superior pole of the patella.       Acute impacted left femoral subcapital fracture.   Degenerative changes as described above.   MACRO: None   Signed by: Austin Webster 10/7/2023 1:21 AM Dictation workstation:   [id number]    CT head wo IV contrast    Result Date: 10/7/2023  Interpreted By:  Eagle Rose, STUDY: CT HEAD WO IV CONTRAST;  10/7/2023 12:31 am   INDICATION: Signs/Symptoms:fall.   COMPARISON: Noncontrast head CT of 03/12/2023.   ACCESSION NUMBER(S): [id number]   ORDERING CLINICIAN: PEPE MORAN   TECHNIQUE: Noncontrast axial CT scan of head was performed. Angled reformats in brain and bone windows were generated. The images were reviewed in bone, brain, blood and soft tissue windows.   FINDINGS: CSF Spaces: Redemonstrated ex vacuo dilation of the right lateral ventricle. Ventricular system is stable in caliber without definite hydrocephalus. There is no extraaxial fluid collection.   Parenchyma: Moderate to advanced volume loss.  There is periventricular and subcortical white matter hypoattenuation, most in keeping with chronic microvascular ischemic change. Stable right cerebral hemispheric encephalomalacia. The grey-white differentiation is intact. There is no mass effect or midline shift.  There is no intracranial hemorrhage.   Calvarium: The calvarium is unremarkable.   Paranasal sinuses and  mastoids: Visualized paranasal sinuses and mastoids are clear.       No evidence of acute cortical infarct or intracranial hemorrhage. No substantial change relative to the prior exam.   MACRO: None   Signed by: Eagle Rose 10/7/2023 12:36 AM Dictation workstation:   [id number]       Lab Results  Results for orders placed or performed during the hospital encounter of 10/06/23 (from the past 24 hour(s))   Hemoglobin and hematocrit, blood   Result Value Ref Range    Hemoglobin 8.6 (L) 13.5 - 17.5 g/dL    Hematocrit 26.0 (L) 41.0 - 52.0 %   Electrocardiogram, 12-lead PRN ACS symptoms   Result Value Ref Range    Ventricular Rate 64 BPM    Atrial Rate 63 BPM    HI Interval 179 ms    QRS Duration 144 ms    QT Interval 437 ms    QTC Calculation(Bazett) 451 ms    P Axis 0 degrees    R Axis -17 degrees    T Axis 33 degrees    QRS Count 11 beats    Q Onset 249 ms    T Offset 468 ms    QTC Fredericia 446 ms   CBC   Result Value Ref Range    WBC 5.7 4.4 - 11.3 x10*3/uL    nRBC 0.0 0.0 - 0.0 /100 WBCs    RBC 2.71 (L) 4.50 - 5.90 x10*6/uL    Hemoglobin 8.1 (L) 13.5 - 17.5 g/dL    Hematocrit 24.6 (L) 41.0 - 52.0 %    MCV 91 80 - 100 fL    MCH 29.9 26.0 - 34.0 pg    MCHC 32.9 32.0 - 36.0 g/dL    RDW 13.3 11.5 - 14.5 %    Platelets 155 150 - 450 x10*3/uL    MPV 11.7 (H) 7.5 - 11.5 fL   Basic Metabolic Panel   Result Value Ref Range    Glucose 102 (H) 74 - 99 mg/dL    Sodium 136 136 - 145 mmol/L    Potassium 4.0 3.5 - 5.3 mmol/L    Chloride 104 98 - 107 mmol/L    Bicarbonate 28 21 - 32 mmol/L    Anion Gap 8 (L) 10 - 20 mmol/L    Urea Nitrogen 19 6 - 23 mg/dL    Creatinine 0.76 0.50 - 1.30 mg/dL    eGFR >90 >60 mL/min/1.73m*2    Calcium 7.9 (L) 8.6 - 10.3 mg/dL   Magnesium   Result Value Ref Range    Magnesium 1.74 1.60 - 2.40 mg/dL        Medications  Scheduled medications:  atorvastatin, 40 mg, oral, Daily  cyanocobalamin, 1,000 mcg, oral, Daily  ergocalciferol, 1,250 mcg, oral, Weekly  erythromycin, 1 cm, Both Eyes, Nightly  folic  acid, 1 mg, oral, Daily  levothyroxine, 112 mcg, oral, Daily  melatonin, 3 mg, oral, Daily  oxybutynin, 5 mg, oral, TID  polyethylene glycol, 17 g, oral, Daily  sennosides-docusate sodium, 1 tablet, oral, BID  sertraline, 100 mg, oral, Daily      Continuous medications:     PRN medications:  PRN medications: acetaminophen **OR** acetaminophen **OR** acetaminophen, melatonin, ondansetron ODT **OR** ondansetron, oxyCODONE, oxyCODONE, oxygen     Physical Exam  Constitutional:       General: He is not in acute distress.     Appearance: Normal appearance.   HENT:      Head: Normocephalic and atraumatic.      Right Ear: External ear normal.      Left Ear: External ear normal.      Nose: Nose normal.      Mouth/Throat:      Mouth: Mucous membranes are moist.      Pharynx: Oropharynx is clear.   Eyes:      Extraocular Movements: Extraocular movements intact.      Pupils: Pupils are equal, round, and reactive to light.      Comments: Left ectropion with erythema of lid  No further discharge from right eye   Cardiovascular:      Rate and Rhythm: Normal rate and regular rhythm.      Pulses: Normal pulses.      Heart sounds: Normal heart sounds.   Pulmonary:      Effort: Pulmonary effort is normal. No respiratory distress.      Breath sounds: Normal breath sounds. No wheezing, rhonchi or rales.   Abdominal:      General: Abdomen is flat. Bowel sounds are normal.      Palpations: Abdomen is soft.      Tenderness: There is no abdominal tenderness. There is no right CVA tenderness, left CVA tenderness, guarding or rebound.   Musculoskeletal:         General: No swelling. Normal range of motion.      Cervical back: Normal range of motion and neck supple.      Comments: Left hip post-op dressing C/D/I, slight bloody drainage. NVS intact distal to operative site   Skin:     General: Skin is warm and dry.      Capillary Refill: Capillary refill takes less than 2 seconds.      Findings: No lesion or rash.   Neurological:      General:  "No focal deficit present.      Mental Status: He is alert and oriented to person, place, and time. Mental status is at baseline.      Comments: limited range of motion of the left upper and lower extremity with some contractures due to hemiplegia from prior stroke and superimposed left hip fracture status post arthroplasty  Patient does have left facial droop, left eyelid drop, and weakness of both left upper and lower extremities motor 1/5 and mild expressive aphasia   Psychiatric:         Mood and Affect: Mood normal.         Behavior: Behavior normal.                  Code Status  Full Code     Assessment/Plan        Acute blood loss anemia  Secondary to fracture  Monitor H/H  Supplement B12 and iron  Hold SQ Lovenox    Cerebral infarction (CMS/[facility])  Baseline L weakness and dysarthria    Enlarged prostate  Needs outpatient follow up with PCP for PSA and SUZY    Hypothyroidism  Continue home medications    Vitamin D deficiency  Continue home medicaiton    Closed fracture of left hip, initial encounter (CMS/[facility])  S/p status post left hip CRPP 10/7/23  PT/OT- dc planning SNF  Orthopedics consult- recommentations are as follows:  \"-50% weight bearing LLE.  -Recommend Lovenox x6 weeks for DVT prophylaxis.  -Staples removed 2 weeks after surgery.  This should be around 10/21/2023.  This can be completed at his rehab facility or at ortho office depending on where the patient is at that time.  -Follow-up at ortho office in [place] on 10/20/2023 for evaluation of wound and x-ray.  This is pending his discharge from his rehab facility.  The scheduling number is [phone].\"    Protein calorie malnutrition (CMS/[facility])  Clinically diagnosed  Dietitian consult    Acute hypoxic respiratory failure (CMS/[facility])  CXR with some atelectasis  Continue to wean oxygen as able  89% on RA this morning therefore placed on 1L again  No evidence pf PNA or pneumothorax on CXR  Encourage IS        DVT ppx: Hold Lovenox given anemia   "     Barbara Martinez PA-C

## 2023-10-11 NOTE — PROGRESS NOTES
Physical Therapy    Physical Therapy Treatment    Patient Name: Saleem Alba  MRN: [id number]  Today's Date: 10/11/2023  Time Calculation  Start Time: 0730  Stop Time: 0813  Time Calculation (min): 43 min       Assessment/Plan   PT Assessment  PT Assessment Results: Decreased strength, Decreased range of motion, Decreased endurance, Impaired balance, Decreased mobility, Decreased safety awareness, Orthopedic restrictions, Pain  Rehab Prognosis: Fair  Evaluation/Treatment Tolerance: Patient limited by fatigue, Patient limited by pain  Medical Staff Made Aware: Yes  Strengths: Support of extended family/friends  Barriers to Participation: Ability to acquire knowledge  End of Session Communication: Bedside nurse  Assessment Comment: patient able to progress to trasnfers, but requries  increased assitance to manuvear right LE .  End of Session Patient Position: Up in chair (alarm on but no geeen herberth xnotified nursing)  PT Plan  Inpatient/Swing Bed or Outpatient: Inpatient  PT Plan  Treatment/Interventions: Bed mobility, Transfer training, Strengthening, Wheelchair management  PT Plan: Skilled PT  PT Frequency: 5 times per week  PT Discharge Recommendations: Moderate intensity level of continued care  Equipment Recommended upon Discharge:  (TBD)  PT Recommended Transfer Status: Assist x2 (TO EOB)      General Visit Information:   PT  Visit  PT Received On: 10/11/23 (pateint agreeable and eager)  General  Prior to Session Communication: Bedside nurse  Patient Position Received: Bed, 3 rail up, Alarm on    Subjective   Precautions:  Precautions  LE Weight Bearing Status:  (PWB LLE 50%)  Medical Precautions: Fall precautions  Post-Surgical Precautions: Left hip precautions  Precautions Comment: PT. HAS EXPRESSIVE APHASIA  Vital Signs:       Objective   Pain:  Pain Assessment  Pain Assessment: 0-10  Pain Score: 2  Pain Type: Surgical pain  Pain Location: Hip  Pain Orientation: Left  Pain Interventions: Cold applied,  Distraction, Relaxation technique, Repositioned  Response to Interventions: patient states pain is the same but still feels better in the chiar  Cognition:  Cognition  Overall Cognitive Status: Within Functional Limits (but garbled speech at times)  Postural Control:     Extremity/Trunk Assessments:    Activity Tolerance:  Activity Tolerance  Endurance: Tolerates 10 - 20 min exercise with multiple rests  Treatments:  Therapeutic Exercise  Therapeutic Exercise Performed: Yes  Therapeutic Exercise Activity 1: ssupine  gluete sets, quad sets, ankle pumps 20 reps ech, heelslides, hip abduction and SAQ 15 reps eacch iwht min assist for left LE    Therapeutic Activity  Therapeutic Activity Performed: Yes  Therapeutic Activity 1: static standing  x 90seconds withmod assist    Bed Mobility  Bed Mobility: Yes  Bed Mobility 1  Bed Mobility 1: Supine to sitting  Level of Assistance 1: Moderate assistance, Moderate verbal cues    Transfers  Transfer: Yes  Transfer 1  Transfer From 1: Bed to, Wheelchair to  Technique 1: Stand pivot  Transfer Device 1: Walker  Transfer Level of Assistance 1: Maximum assistance, +2  Trials/Comments 1: pateint requriing cues to put left Leonfloor and maintain PWB weight bearing, patient does NWB    Outcome Measures:  [facility] Basic Mobility  Turning from your back to your side while in a flat bed without using bedrails: A lot  Moving from lying on your back to sitting on the side of a flat bed without using bedrails: Total  Moving to and from bed to chair (including a wheelchair): Total  Standing up from a chair using your arms (e.g. wheelchair or bedside chair): Total  To walk in hospital room: Total  Climbing 3-5 steps with railing: Total  Basic Mobility - Total Score: 7    Education Documentation  Precautions, taught by Bertha Mccartney PTA at 10/11/2023  8:31 AM.  Learner: Patient  Readiness: Acceptance  Method: Explanation  Response: Verbalizes Understanding, Needs Reinforcement  Comment: for PWB  and safety with tranfers    Mobility Training, taught by Bertha Mccartney PTA at 10/11/2023  8:31 AM.  Learner: Patient  Readiness: Acceptance  Method: Explanation  Response: Verbalizes Understanding, Needs Reinforcement  Comment: for PWB and safety with tranfers    Education Comments  No comments found.        OP EDUCATION:       Encounter Problems       Encounter Problems (Active)       Mobility       BED MOBILITY (Progressing)       Start:  10/09/23    Expected End:  10/30/23       ROLL IN BED, SUPINE<>SIT MOD X1         STRENGTH (Progressing)       Start:  10/09/23    Expected End:  10/30/23       20+ REPS EX INCREASING STRENGTH TO PROGRESS OOB ACTIVITIES            Transfers       SIT<>STAND (Progressing)       Start:  10/09/23    Expected End:  10/30/23       SIT<>STAND MIN X2 FWW PWB LLE IMPROVING STABILITY TO PROGRESS TO CHAIR AND GAIT ASSESSMENT

## 2023-10-11 NOTE — PROGRESS NOTES
PT chose [facility] and [facility]  Both have accepted.  I have asked [facility] to start precert.

## 2023-10-11 NOTE — ASSESSMENT & PLAN NOTE
CXR with some atelectasis  Continue to wean oxygen as able  89% on RA this morning therefore placed on 1L again  No evidence pf PNA or pneumothorax on CXR  Encourage IS

## 2023-10-12 ENCOUNTER — APPOINTMENT (OUTPATIENT)
Dept: PRIMARY CARE | Facility: CLINIC | Age: 78
End: 2023-10-12
Payer: MEDICARE

## 2023-10-12 VITALS
DIASTOLIC BLOOD PRESSURE: 56 MMHG | RESPIRATION RATE: 16 BRPM | SYSTOLIC BLOOD PRESSURE: 94 MMHG | TEMPERATURE: 99 F | HEIGHT: 69 IN | HEART RATE: 61 BPM | OXYGEN SATURATION: 94 % | BODY MASS INDEX: 23.7 KG/M2 | WEIGHT: 160 LBS

## 2023-10-12 LAB
ANION GAP SERPL CALC-SCNC: 8 MMOL/L (ref 10–20)
BUN SERPL-MCNC: 18 MG/DL (ref 6–23)
CALCIUM SERPL-MCNC: 8.2 MG/DL (ref 8.6–10.3)
CHLORIDE SERPL-SCNC: 106 MMOL/L (ref 98–107)
CO2 SERPL-SCNC: 28 MMOL/L (ref 21–32)
CREAT SERPL-MCNC: 0.68 MG/DL (ref 0.5–1.3)
ERYTHROCYTE [DISTWIDTH] IN BLOOD BY AUTOMATED COUNT: 13.2 % (ref 11.5–14.5)
GFR SERPL CREATININE-BSD FRML MDRD: >90 ML/MIN/1.73M*2
GLUCOSE SERPL-MCNC: 103 MG/DL (ref 74–99)
HCT VFR BLD AUTO: 24.8 % (ref 41–52)
HGB BLD-MCNC: 8.3 G/DL (ref 13.5–17.5)
MAGNESIUM SERPL-MCNC: 1.76 MG/DL (ref 1.6–2.4)
MCH RBC QN AUTO: 30.5 PG (ref 26–34)
MCHC RBC AUTO-ENTMCNC: 33.5 G/DL (ref 32–36)
MCV RBC AUTO: 91 FL (ref 80–100)
NRBC BLD-RTO: 0 /100 WBCS (ref 0–0)
PLATELET # BLD AUTO: 177 X10*3/UL (ref 150–450)
PMV BLD AUTO: 10.7 FL (ref 7.5–11.5)
POTASSIUM SERPL-SCNC: 3.9 MMOL/L (ref 3.5–5.3)
RBC # BLD AUTO: 2.72 X10*6/UL (ref 4.5–5.9)
SODIUM SERPL-SCNC: 138 MMOL/L (ref 136–145)
WBC # BLD AUTO: 6.8 X10*3/UL (ref 4.4–11.3)

## 2023-10-12 PROCEDURE — 85027 COMPLETE CBC AUTOMATED: CPT | Performed by: PHYSICIAN ASSISTANT

## 2023-10-12 PROCEDURE — 83735 ASSAY OF MAGNESIUM: CPT | Performed by: PHYSICIAN ASSISTANT

## 2023-10-12 PROCEDURE — 80048 BASIC METABOLIC PNL TOTAL CA: CPT | Performed by: PHYSICIAN ASSISTANT

## 2023-10-12 PROCEDURE — 2500000004 HC RX 250 GENERAL PHARMACY W/ HCPCS (ALT 636 FOR OP/ED): Performed by: INTERNAL MEDICINE

## 2023-10-12 PROCEDURE — 2500000001 HC RX 250 WO HCPCS SELF ADMINISTERED DRUGS (ALT 637 FOR MEDICARE OP): Performed by: PHYSICIAN ASSISTANT

## 2023-10-12 PROCEDURE — 97530 THERAPEUTIC ACTIVITIES: CPT | Mod: GP

## 2023-10-12 PROCEDURE — 97166 OT EVAL MOD COMPLEX 45 MIN: CPT | Mod: GO

## 2023-10-12 PROCEDURE — 36415 COLL VENOUS BLD VENIPUNCTURE: CPT | Performed by: PHYSICIAN ASSISTANT

## 2023-10-12 PROCEDURE — 97110 THERAPEUTIC EXERCISES: CPT | Mod: GP,CQ

## 2023-10-12 PROCEDURE — 2500000001 HC RX 250 WO HCPCS SELF ADMINISTERED DRUGS (ALT 637 FOR MEDICARE OP): Performed by: STUDENT IN AN ORGANIZED HEALTH CARE EDUCATION/TRAINING PROGRAM

## 2023-10-12 PROCEDURE — 2500000001 HC RX 250 WO HCPCS SELF ADMINISTERED DRUGS (ALT 637 FOR MEDICARE OP): Performed by: INTERNAL MEDICINE

## 2023-10-12 PROCEDURE — 99239 HOSP IP/OBS DSCHRG MGMT >30: CPT | Performed by: INTERNAL MEDICINE

## 2023-10-12 RX ORDER — SODIUM CHLORIDE 0.9 % (FLUSH) 0.9 %
SYRINGE (ML) INJECTION
Status: DISPENSED
Start: 2023-10-12 | End: 2023-10-12

## 2023-10-12 RX ORDER — ERYTHROMYCIN 5 MG/G
OINTMENT OPHTHALMIC NIGHTLY
Qty: 1 G | Refills: 0
Start: 2023-10-12 | End: 2023-11-11

## 2023-10-12 RX ORDER — OXYCODONE HYDROCHLORIDE 5 MG/1
10 TABLET ORAL EVERY 6 HOURS PRN
Qty: 8 TABLET | Refills: 0 | Status: SHIPPED | OUTPATIENT
Start: 2023-10-12 | End: 2023-10-17

## 2023-10-12 RX ORDER — AMOXICILLIN 250 MG
1 CAPSULE ORAL 2 TIMES DAILY
Qty: 6 TABLET | Refills: 0
Start: 2023-10-12

## 2023-10-12 RX ADMIN — SERTRALINE 100 MG: 100 TABLET, FILM COATED ORAL at 08:45

## 2023-10-12 RX ADMIN — SENNOSIDES AND DOCUSATE SODIUM 1 TABLET: 50; 8.6 TABLET ORAL at 08:45

## 2023-10-12 RX ADMIN — LEVOTHYROXINE SODIUM 112 MCG: 112 TABLET ORAL at 05:56

## 2023-10-12 RX ADMIN — OXYCODONE HYDROCHLORIDE 5 MG: 5 TABLET ORAL at 14:30

## 2023-10-12 RX ADMIN — OXYBUTYNIN CHLORIDE 5 MG: 5 TABLET ORAL at 14:31

## 2023-10-12 RX ADMIN — POLYETHYLENE GLYCOL 3350 17 G: 17 POWDER, FOR SOLUTION ORAL at 08:44

## 2023-10-12 RX ADMIN — CYANOCOBALAMIN TAB 1000 MCG 1000 MCG: 1000 TAB at 08:44

## 2023-10-12 RX ADMIN — FOLIC ACID 1 MG: 1 TABLET ORAL at 08:45

## 2023-10-12 RX ADMIN — OXYBUTYNIN CHLORIDE 5 MG: 5 TABLET ORAL at 08:45

## 2023-10-12 ASSESSMENT — COGNITIVE AND FUNCTIONAL STATUS - GENERAL
DRESSING REGULAR UPPER BODY CLOTHING: A LOT
TURNING FROM BACK TO SIDE WHILE IN FLAT BAD: TOTAL
WALKING IN HOSPITAL ROOM: TOTAL
TURNING FROM BACK TO SIDE WHILE IN FLAT BAD: TOTAL
MOBILITY SCORE: 6
TOILETING: TOTAL
WALKING IN HOSPITAL ROOM: TOTAL
DAILY ACTIVITIY SCORE: 10
DRESSING REGULAR LOWER BODY CLOTHING: TOTAL
HELP NEEDED FOR BATHING: A LOT
MOVING TO AND FROM BED TO CHAIR: TOTAL
PERSONAL GROOMING: A LOT
MOVING FROM LYING ON BACK TO SITTING ON SIDE OF FLAT BED WITH BEDRAILS: TOTAL
CLIMB 3 TO 5 STEPS WITH RAILING: TOTAL
STANDING UP FROM CHAIR USING ARMS: TOTAL
MOVING FROM LYING ON BACK TO SITTING ON SIDE OF FLAT BED WITH BEDRAILS: TOTAL
EATING MEALS: A LOT
STANDING UP FROM CHAIR USING ARMS: TOTAL
MOBILITY SCORE: 6
CLIMB 3 TO 5 STEPS WITH RAILING: TOTAL
MOVING TO AND FROM BED TO CHAIR: TOTAL

## 2023-10-12 ASSESSMENT — PAIN - FUNCTIONAL ASSESSMENT
PAIN_FUNCTIONAL_ASSESSMENT: 0-10

## 2023-10-12 ASSESSMENT — ACTIVITIES OF DAILY LIVING (ADL): ADL_ASSISTANCE: INDEPENDENT

## 2023-10-12 ASSESSMENT — PAIN SCALES - GENERAL
PAINLEVEL_OUTOF10: 0 - NO PAIN
PAINLEVEL_OUTOF10: 0 - NO PAIN
PAINLEVEL_OUTOF10: 2
PAINLEVEL_OUTOF10: 0 - NO PAIN
PAINLEVEL_OUTOF10: 5 - MODERATE PAIN

## 2023-10-12 NOTE — PROGRESS NOTES
Patient is set for 6 pm transport to [facility]. Pt's care team informed of transport time via secure chat message. [facility] phoned pt's spouse and left voice mail updating on transport time.

## 2023-10-12 NOTE — PROGRESS NOTES
Transfers  Transfer:  (Sit<---> Stand transfer x's 4 with Mod/Max A of 2  with FWW and L hand pre gripped on FWW with 3rd person assist to seperate LE's from crossing and attempt promote Knee extension in LLE to ready for WS and preparing for gait.)  Physical Therapy    Physical Therapy Treatment    Patient Name: Saleem Alba  MRN: [id number]  Today's Date: 10/12/2023  Time Calculation  Start Time: 1354  Stop Time: 1432  Time Calculation (min): 38 min       Assessment/Plan   PT Assessment  PT Assessment Results:  (Wife present, very helpful with pt. care. pt. increased mobility toleration standing endurance x's 4 reps decreasing time each attempt with decreasing toleration with evidence of increased flexed trunk posture and flexed at knees.)  Rehab Prognosis: Fair (for today due to lethargic)  Evaluation/Treatment Tolerance:  (pt. returned to bed supine with Max A of 2, completed therex and Max A of 2 for rolling, and moving to HOB. Pillows placed for comfort, pads and large green mobility kevin. Bed alarm on , call light in reach, SCD's donned. All needs met.)  Medical Staff Made Aware: Yes  Strengths: Support of extended family/friends  Barriers to Participation: Ability to acquire knowledge  End of Session Communication: PCT/NA/CTA  Assessment Comment: pateint very lethatgic today difficult to keep arosued for therapy  End of Session Patient Position: Bed, 3 rail up, Alarm off, not on at start of session  PT Plan  Inpatient/Swing Bed or Outpatient: Inpatient  PT Plan  Treatment/Interventions: Bed mobility, Transfer training, Strengthening, Wheelchair management  PT Plan: Skilled PT  PT Frequency: 5 times per week  PT Discharge Recommendations: Moderate intensity level of continued care  Equipment Recommended upon Discharge:  (TBD)  PT Recommended Transfer Status: Assist x2 (TO EOB)      General Visit Information:   PT  Visit  PT Received On: 10/12/23  General  Family/Caregiver Present: Yes (Wife)  Co-Treatment: OT  "(Secondary to fall risk, and increased need for assist.)  Patient Position Received: Bed, 3 rail up, Alarm on  Preferred Learning Style: visual, verbal    Subjective   Precautions:  Precautions  LE Weight Bearing Status: Left Partial Weight Bearing  Medical Precautions: Fall precautions  Post-Surgical Precautions: Left hip precautions  Splinting:  (pt. wife reports pt. has brace for L wrist/hand, but does not wear it as it is hard to put on - via wife.)  Precautions Comment:  (LLE flexed at knee, foot drop present. Wife reports she will bring to [facility] so that it is not lost in transport. Pt. would benefit from having brace (AFO) during tx here at .)  Vital Signs:  Vital Signs  Heart Rate: 60  Heart Rate Source: Monitor  SpO2: 95 %  Patient Position: Sitting    Objective   Pain:  Pain Assessment  Pain Assessment:  (pt. unable to quantify but reports \"minimal\" and requests)  Pain Type: Acute pain  Pain Location: Hip  Cognition:  Cognition  Overall Cognitive Status: Within Functional Limits (Some garbled speech, but functional enough to complete activities asked of him and provide feedback.)  Postural Control:  Postural Control  Postural Control: Impaired (Flexed trunk posture)    Treatments:  Therapeutic Exercise  Therapeutic Exercise Performed:  (AROM RLE AP's, heelslides, SAQ's, hip abduction/adduction x's 10 reps each vc's to keep on task and complete full reps. RLE post. tib stretch 3 x's 30 secs, SAQ's, heelslides with AAROM all x's 10 reps each. supine in bed.)         Bed Mobility  Bed Mobility:  (Supine<---> Sit Max A of 2 pivot on buttock with HOB slightly elevated with vc's to initiate mobility.)    Transfers  Transfer:  (Sit<---> Stand transfer x's 4 with Mod/Max A of 2 with 3rd person assist to seperate LE's from crossing and attempt promote Knee extension in LLE to ready for WS and preparing for gait.)    Outcome Measures:  [facility] Basic Mobility  Turning from your back to your side while in a flat " bed without using bedrails: Total  Moving from lying on your back to sitting on the side of a flat bed without using bedrails: Total  Moving to and from bed to chair (including a wheelchair): Total  Standing up from a chair using your arms (e.g. wheelchair or bedside chair): Total  To walk in hospital room: Total  Climbing 3-5 steps with railing: Total  Basic Mobility - Total Score: 6    Education Documentation  Precautions, taught by Deisy Hayes PTA at 10/12/2023  3:15 PM.  Learner: Significant Other, Patient  Readiness: Acceptance  Method: Explanation, Demonstration  Response: Verbalizes Understanding, Demonstrated Understanding    Mobility Training, taught by Deisy Hayes PTA at 10/12/2023  3:15 PM.  Learner: Significant Other, Patient  Readiness: Acceptance  Method: Explanation, Demonstration  Response: Verbalizes Understanding, Demonstrated Understanding    Education Comments  No comments found.        OP EDUCATION:       Encounter Problems       Encounter Problems (Active)       Mobility       BED MOBILITY (Progressing)       Start:  10/09/23    Expected End:  10/30/23       ROLL IN BED, SUPINE<>SIT MOD X1         STRENGTH (Progressing)       Start:  10/09/23    Expected End:  10/30/23       20+ REPS EX INCREASING STRENGTH TO PROGRESS OOB ACTIVITIES            Transfers       SIT<>STAND (Progressing)       Start:  10/09/23    Expected End:  10/30/23       SIT<>STAND MIN X2 FWW PWB LLE IMPROVING STABILITY TO PROGRESS TO CHAIR AND GAIT ASSESSMENT

## 2023-10-12 NOTE — PROGRESS NOTES
Physical Therapy                 Therapy Communication Note    Patient Name: Saleem Alba  MRN: [id number]  Today's Date: 10/12/2023     Discipline: Physical Therapy    Missed Visit Reason:      Missed Time: Attempt    Comment: attempted patient x2 this morning, first time he was sleeping, second time he declined therapy stating not now.

## 2023-10-12 NOTE — PROGRESS NOTES
Saleem Alba is a 78 y.o. male on day 5 of admission presenting with Closed fracture of left hip, initial encounter (CMS/[facility]).      Subjective   Saleem Alba is a 78 y.o. 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 + Head strike (CTH neg). CXR neg. X-ray of the left hip and left femur did confirm impacted left subcapital femoral fracture. Discharge planning: Plan discharge to skilled nursing facility awaiting referral/authorization. Abnormal findings concerning vertebral body and prostate discussed in detail with patient and patient's wife recommend close follow-up with family physician. CXR with bilateral blunting of costophrenic angles.   10/11: No acute events overnight. Vitals stable, 89% RA- placed back on 1L, tmax 100.4. CXR with blunting bilaterally, no pneumonia or pneumothorax, encourage IS. UA negative for UTI. Hgb 8.1 (8.6, 9.1, 10.6, 13.5). B12 272- supplement. Iron/TIBC low- supplement IV iron. Add Senna-S BID.  10/12: No acute events overnight.  Patient denies any pain.  She denies any shortness of breath, chest pain or palpitation or lightheadedness.  Denies any abdominal pain, nausea or vomiting.  We are currently awaiting prior authorization to skilled nursing facility.           Objective     Last Recorded Vitals  /60 (BP Location: Left arm, Patient Position: Lying)   Pulse 57   Temp 36.6 °C (97.8 °F) (Temporal)   Resp 16   Wt 72.6 kg (160 lb)   SpO2 95%     Image Results  CT hip left wo IV contrast    Result Date: 10/7/2023  Interpreted By:  Kris Wagner, STUDY: CT HIP LEFT WO IV CONTRAST;  10/7/2023 11:38 am   INDICATION: Signs/Symptoms:evaluate L hip fracture.   COMPARISON: Plain film examination same day reporting impacted left femoral subcapital fracture.   ACCESSION NUMBER(S): [id number]   ORDERING CLINICIAN:  VEL HAYNES   TECHNIQUE: Helical CT of the pelvis was performed without IV contrast. Axial, coronal, and sagittal reconstructions were performed and reviewed. In addition, oblique reconstructions of the bilateral hips and 3D reconstructions of the hips and pelvis were performed.   FINDINGS: 11 mm bone lesion present of the lower left L4 vertebral body nonspecific. This likely reflects a Schmorl's node. Other less likely possibilities include hemangioma, plasmacytoma, lytic metastasis among other causes.   There is a nondisplaced fracture of the left femoral neck. Minimal impaction. No distraction or butterfly fragments. Small joint effusion.   Prostate gland is enlarged. Consider digital rectal examination and correlation with PSA.     .       Nondisplaced mildly impacted fracture of the left femoral neck.   Probable benign lesion of the L4 vertebral body.     MACRO: None   Signed by: Kris Wagner 10/7/2023 12:50 PM Dictation workstation:   [id number]    XR chest 1 view    Result Date: 10/7/2023  Interpreted By:  Austin Webster, STUDY: XR CHEST 1 VIEW;  10/7/2023 2:02 am   INDICATION: Hypertension status post fall   COMPARISON: Chest x-ray 03/12/2023   ACCESSION NUMBER(S): [id number]   ORDERING CLINICIAN: PEPE MORAN   FINDINGS: Overlying leads noted.   CARDIOMEDIASTINAL SILHOUETTE: Cardiomediastinal silhouette is stable in size and configuration. Atherosclerotic calcification of the aorta.   LUNGS: There is subtle increased airspace opacity overlying the left mid lung which may relate to atelectasis versus developing airspace disease. Right lung is clear. No consolidation, effusion or pneumothorax   ABDOMEN: No remarkable upper abdominal findings.   BONES: Multilevel degenerative changes of the spine.       Subtle increased airspace opacity in the left mid lung may relate to atelectasis versus developing airspace disease. Correlate clinically.   MACRO: None   Signed by: Austin Webster 10/7/2023 2:04  AM Dictation workstation:   [id number]    XR hip left 2 or 3 views    Result Date: 10/7/2023  Interpreted By:  Austin Webster, STUDY: XR HIP LEFT 2 OR 3 VIEWS; XR FEMUR LEFT 2+ VIEWS; ;  10/7/2023 1:03 am   INDICATION: Signs/Symptoms:fall.   COMPARISON: Pelvic x-rays 3/12/2023.   ACCESSION NUMBER(S): [id number]; [id number]   ORDERING CLINICIAN: PEPE MORAN   FINDINGS: AP view of the pelvis as well as AP and lateral views of the left hip and femur are obtained.   Generalized diffuse osteopenia. There is an acute mildly impacted subcapital femoral fracture. The femoral head articulates with the acetabulum. Mild bilateral hip osteoarthrosis. Degenerative changes of the lower lumbar spine, SI joints and symphysis pubis. Left knee osteoarthrosis. No significant knee joint effusion. Enthesopathy changes noted along the superior pole of the patella.       Acute impacted left femoral subcapital fracture.   Degenerative changes as described above.   MACRO: None   Signed by: Austin Webster 10/7/2023 1:21 AM Dictation workstation:   [id number]    XR femur left 2+ views    Result Date: 10/7/2023  Interpreted By:  Austin Webster, STUDY: XR HIP LEFT 2 OR 3 VIEWS; XR FEMUR LEFT 2+ VIEWS; ;  10/7/2023 1:03 am   INDICATION: Signs/Symptoms:fall.   COMPARISON: Pelvic x-rays 3/12/2023.   ACCESSION NUMBER(S): [id number]; [id number]   ORDERING CLINICIAN: PEPE MORAN   FINDINGS: AP view of the pelvis as well as AP and lateral views of the left hip and femur are obtained.   Generalized diffuse osteopenia. There is an acute mildly impacted subcapital femoral fracture. The femoral head articulates with the acetabulum. Mild bilateral hip osteoarthrosis. Degenerative changes of the lower lumbar spine, SI joints and symphysis pubis. Left knee osteoarthrosis. No significant knee joint effusion. Enthesopathy changes noted along the superior pole of the patella.       Acute impacted left femoral subcapital fracture.   Degenerative  changes as described above.   MACRO: None   Signed by: Austin Webster 10/7/2023 1:21 AM Dictation workstation:   [id number]    CT head wo IV contrast    Result Date: 10/7/2023  Interpreted By:  Eagle Rose, STUDY: CT HEAD WO IV CONTRAST;  10/7/2023 12:31 am   INDICATION: Signs/Symptoms:fall.   COMPARISON: Noncontrast head CT of 03/12/2023.   ACCESSION NUMBER(S): [id number]   ORDERING CLINICIAN: PEPE MORAN   TECHNIQUE: Noncontrast axial CT scan of head was performed. Angled reformats in brain and bone windows were generated. The images were reviewed in bone, brain, blood and soft tissue windows.   FINDINGS: CSF Spaces: Redemonstrated ex vacuo dilation of the right lateral ventricle. Ventricular system is stable in caliber without definite hydrocephalus. There is no extraaxial fluid collection.   Parenchyma: Moderate to advanced volume loss.  There is periventricular and subcortical white matter hypoattenuation, most in keeping with chronic microvascular ischemic change. Stable right cerebral hemispheric encephalomalacia. The grey-white differentiation is intact. There is no mass effect or midline shift.  There is no intracranial hemorrhage.   Calvarium: The calvarium is unremarkable.   Paranasal sinuses and mastoids: Visualized paranasal sinuses and mastoids are clear.       No evidence of acute cortical infarct or intracranial hemorrhage. No substantial change relative to the prior exam.   MACRO: None   Signed by: Eagle Rose 10/7/2023 12:36 AM Dictation workstation:   [id number]       Lab Results  Results for orders placed or performed during the hospital encounter of 10/06/23 (from the past 24 hour(s))   CBC   Result Value Ref Range    WBC 6.8 4.4 - 11.3 x10*3/uL    nRBC 0.0 0.0 - 0.0 /100 WBCs    RBC 2.72 (L) 4.50 - 5.90 x10*6/uL    Hemoglobin 8.3 (L) 13.5 - 17.5 g/dL    Hematocrit 24.8 (L) 41.0 - 52.0 %    MCV 91 80 - 100 fL    MCH 30.5 26.0 - 34.0 pg    MCHC 33.5 32.0 - 36.0 g/dL    RDW 13.2 11.5 -  14.5 %    Platelets 177 150 - 450 x10*3/uL    MPV 10.7 7.5 - 11.5 fL   Basic Metabolic Panel   Result Value Ref Range    Glucose 103 (H) 74 - 99 mg/dL    Sodium 138 136 - 145 mmol/L    Potassium 3.9 3.5 - 5.3 mmol/L    Chloride 106 98 - 107 mmol/L    Bicarbonate 28 21 - 32 mmol/L    Anion Gap 8 (L) 10 - 20 mmol/L    Urea Nitrogen 18 6 - 23 mg/dL    Creatinine 0.68 0.50 - 1.30 mg/dL    eGFR >90 >60 mL/min/1.73m*2    Calcium 8.2 (L) 8.6 - 10.3 mg/dL   Magnesium   Result Value Ref Range    Magnesium 1.76 1.60 - 2.40 mg/dL        Medications  Scheduled medications:  atorvastatin, 40 mg, oral, Daily  cyanocobalamin, 1,000 mcg, oral, Daily  ergocalciferol, 1,250 mcg, oral, Weekly  erythromycin, 1 cm, Both Eyes, Nightly  influenza, 0.7 mL, intramuscular, During hospitalization  folic acid, 1 mg, oral, Daily  levothyroxine, 112 mcg, oral, Daily  melatonin, 3 mg, oral, Daily  oxybutynin, 5 mg, oral, TID  polyethylene glycol, 17 g, oral, Daily  sennosides-docusate sodium, 1 tablet, oral, BID  sertraline, 100 mg, oral, Daily  sodium chloride 0.9%, , ,       Continuous medications:     PRN medications:  PRN medications: acetaminophen **OR** acetaminophen **OR** acetaminophen, melatonin, ondansetron ODT **OR** ondansetron, oxyCODONE, oxyCODONE, oxygen, sodium chloride 0.9%     CONSTITUTIONAL -pleasant frail elderly female, alert, in no acute distress, not ill-appearing  SKIN - normal skin color ,warm  HEAD - no trauma, normocephalic  EYES - pupils are equal and reactive to light  CHEST - clear to auscultation, no wheezing, no crackles and no rales, good effort  CARDIAC - regular rate and regular rhythm, no murmur  ABDOMEN - no organomegaly, soft, nontender, nondistended, normal bowel sounds, no guarding/rebound/rigidity  EXTREMITIES - no edema, no deformities  NEUROLOGICAL - alert, oriented x3 and no acute focal signs  PSYCHIATRIC - alert, pleasant and cordial, age-appropriate               Code Status  Full Code  "    Assessment/Plan      Closed fracture of left hip, initial encounter (CMS/[facility])  S/p status post left hip CRPP 10/7/23  PT/OT- dc planning SNF  Orthopedics consult- recommentations are as follows:  \"-50% weight bearing LLE.  -Recommend Lovenox x6 weeks for DVT prophylaxis.  -Staples removed 2 weeks after surgery.  This should be around 10/21/2023.  This can be completed at his rehab facility or at ortho office depending on where the patient is at that time.  -Follow-up at ortho office in [place] on 10/20/2023 for evaluation of wound and x-ray.  This is pending his discharge from his rehab facility.  The scheduling number is [phone].\"  10/12: Awaiting prior authorization to skilled nursing facility    Acute blood loss anemia  Secondary to fracture  Monitor H/H  Supplement B12 and iron  Hold SQ Lovenox  10/12: H&H stable    Cerebral infarction (CMS/[facility])  Baseline L weakness and dysarthria    Enlarged prostate  Needs outpatient follow up with PCP for PSA and SUZY    Hypothyroidism  Continue home medications    Vitamin D deficiency  Continue home medicaiton        Protein calorie malnutrition (CMS/[facility])  Clinically diagnosed  Dietitian consult    Acute hypoxic respiratory failure (CMS/[facility])  CXR with some atelectasis  Continue to wean oxygen as able  89% on RA this morning therefore placed on 1L again  No evidence pf PNA or pneumothorax on CXR  Encourage IS        DVT ppx: Hold Lovenox given anemia       Unruly Casiano MD   "

## 2023-10-12 NOTE — PROGRESS NOTES
Physical Therapy    Physical Therapy Treatment    Patient Name: Saleem Alba  MRN: [id number]  Today's Date: 10/12/2023  Time Calculation  Start Time: 1106  Stop Time: 1117  Time Calculation (min): 11 min       Assessment/Plan   PT Assessment  PT Assessment Results: Decreased strength, Impaired balance, Decreased mobility, Decreased safety awareness  Rehab Prognosis: Fair (for today due to lethargic)  Evaluation/Treatment Tolerance: Patient limited by fatigue, Patient limited by pain  Strengths: Support of extended family/friends  End of Session Communication: PCT/NA/CTA  Assessment Comment: pateint very lethargic today difficult to keep arosued for therapy  End of Session Patient Position: Bed, 3 rail up, Alarm off, not on at start of session  PT Plan  Inpatient/Swing Bed or Outpatient: Inpatient  PT Plan  Treatment/Interventions: Bed mobility, Transfer training, Strengthening, Wheelchair management  PT Plan: Skilled PT  PT Frequency: 5 times per week  PT Discharge Recommendations: Moderate intensity level of continued care  Equipment Recommended upon Discharge:  (TBD)  PT Recommended Transfer Status: Assist x2 (TO EOB)      General Visit Information:   PT  Visit  PT Received On: 10/12/23  Response to Previous Treatment: Other (Comment) (pateitn sleeping easily aroused  agrees now to therapy, butpatient continually falling alseep during exericses and requriing  increased tactile cues t return awake)  General  Prior to Session Communication: Bedside nurse, PCT/NA/CTA  Patient Position Received: Bed, 3 rail up, Alarm off, not on at start of session  General Comment:  (patient wiht visisot  coming in, patient unable to stay aroused to fully participate)    Subjective   Precautions:  Precautions  LE Weight Bearing Status:  (PWB LLE 50%)  Medical Precautions: Fall precautions  Post-Surgical Precautions: Left hip precautions  Precautions Comment: PT. HAS EXPRESSIVE APHASIA  Vital Signs:       Objective   Pain:  Pain  Assessment  Pain Assessment: 0-10  Pain Score: 0 - No pain (patient did not give comment went asked)  Pain Type: Surgical pain  Pain Location: Hip  Pain Orientation: Left  Pain Interventions: Cold applied, Distraction, Relaxation technique, Repositioned  Response to Interventions: patient states pain is the same but still feels better in the chiar  Cognition:  Cognition  Overall Cognitive Status: Impaired (patient lethargic today)  Postural Control:     Extremity/Trunk Assessments:    Activity Tolerance:  Activity Tolerance  Endurance: Tolerates less than 10 min exercise with changes in vital signs (due to falling sleep unable to keep aroused)  Treatments:  Therapeutic Exercise  Therapeutic Exercise Performed: Yes  Therapeutic Exercise Activity 1: supine  gluete sets, quad sets, ankle pumps 20 reps ech, heelslides, hip abduction and SAQ 15 reps each with max asist due to patient following alseep during each exercise.    Outcome Measures:  [facility] Basic Mobility  Turning from your back to your side while in a flat bed without using bedrails: Total  Moving from lying on your back to sitting on the side of a flat bed without using bedrails: Total  Moving to and from bed to chair (including a wheelchair): Total  Standing up from a chair using your arms (e.g. wheelchair or bedside chair): Total  To walk in hospital room: Total  Climbing 3-5 steps with railing: Total  Basic Mobility - Total Score: 6    Education Documentation  Precautions, taught by Bertha Mccartney PTA at 10/12/2023 11:26 AM.  Learner: Patient  Readiness: Acceptance  Method: Explanation  Response: Verbalizes Understanding, Needs Reinforcement  Comment: patient howard branham reinforcmentof exercises and mobility    Mobility Training, taught by Bertha Mccartney PTA at 10/12/2023 11:26 AM.  Learner: Patient  Readiness: Acceptance  Method: Explanation  Response: Verbalizes Understanding, Needs Reinforcement  Comment: patient howard branham  reinforcmentof exercises and mobility    Education Comments  No comments found.        OP EDUCATION:       Encounter Problems       Encounter Problems (Active)       Mobility       BED MOBILITY (Not Progressing)       Start:  10/09/23    Expected End:  10/30/23       ROLL IN BED, SUPINE<>SIT MOD X1         STRENGTH (Not Progressing)       Start:  10/09/23    Expected End:  10/30/23       20+ REPS EX INCREASING STRENGTH TO PROGRESS OOB ACTIVITIES            Transfers       SIT<>STAND (Not Progressing)       Start:  10/09/23    Expected End:  10/30/23       SIT<>STAND MIN X2 FWW PWB LLE IMPROVING STABILITY TO PROGRESS TO CHAIR AND GAIT ASSESSMENT

## 2023-10-12 NOTE — PROGRESS NOTES
Occupational Therapy    Evaluation/Treatment    Patient Name: Saleem Alba  MRN: [id number]  : 1945  Today's Date: 10/12/23  Time Calculation  Start Time: 1351  Stop Time: 1425  Time Calculation (min): 34 min       Assessment:   OT eval completed. Pt can benefit from continued OT to maximize ADL independence and mobility safety.         Plan:  Treatment Interventions: ADL retraining, Functional transfer training, UE strengthening/ROM, Endurance training, Cognitive reorientation, Patient/family training, Equipment evaluation/education, Neuromuscular reeducation, Fine motor coordination activities  OT Frequency: 4 times per week  OT Discharge Recommendations: Moderate intensity level of continued care  Treatment Interventions: ADL retraining, Functional transfer training, UE strengthening/ROM, Endurance training, Cognitive reorientation, Patient/family training, Equipment evaluation/education, Neuromuscular reeducation, Fine motor coordination activities  Subjective     Current Problem:  1. Closed fracture of left hip, initial encounter (CMS/[facility])  oxyCODONE (Roxicodone) 5 mg immediate release tablet      2. Vitamin D deficiency        3. Urinary incontinence, unspecified type        4. Occlusion of carotid artery, unspecified laterality  erythromycin (Romycin) 5 mg/gram (0.5 %) ophthalmic ointment    sennosides-docusate sodium (Nora-Colace) 8.6-50 mg tablet      5. Closed fracture of shaft of right femur with nonunion, unspecified fracture morphology, subsequent encounter          Past Medical History:   Diagnosis Date    Personal history of other diseases of the digestive system     History of gastroesophageal reflux (GERD)    Personal history of other endocrine, nutritional and metabolic disease     History of hypercholesterolemia    Personal history of other mental and behavioral disorders     History of depression    Personal history of transient ischemic attack (TIA), and cerebral infarction without  residual deficits     History of cerebrovascular accident         General:   OT Received On: 10/12/23  General  Reason for Referral: pt. admit for L hip fx. s/p ORIF.  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  Family/Caregiver Present: Yes  Caregiver Feedback: wife present and supportive  Co-Treatment: PT  Co-Treatment Reason: to optimize pt safety and mobility while working on discipline specific goals  Prior to Session Communication: Bedside nurse  Patient Position Received: Bed, 3 rail up, Alarm on    Precautions:  LE Weight Bearing Status: Left Partial Weight Bearing (50%)  Medical Precautions: Fall precautions, Oxygen therapy device and L/min           Pain:  Pain Assessment  Pain Assessment: 0-10  Pain Score:  (minimal pain L hip)  Objective     Cognition:  Overall Cognitive Status: Impaired  Orientation Level: Disoriented to situation  Following Commands: Follows one step commands with increased time  Problem Solving: Assistance required to generate solutions             Home Living:  Type of Home: House  Lives With: Spouse  Home Adaptive Equipment: Walker rolling or standard, Cane  Home Layout: One level  Home Access: Stairs to enter with rails  Entrance Stairs-Number of Steps: 1    Prior Function:  Receives Help From: Family  ADL Assistance: Independent  Homemaking Assistance: Needs assistance  Ambulatory Assistance: Independent (typically wears LLE AFO. does not have while he is here)           ADL Current:  UE Dressing Assistance: Maximal  LE Dressing Assistance: Total  Toileting Assistance with Device: Total           Bed Mobility/Transfers: Bed Mobility  Bed Mobility: Yes  Bed Mobility 1  Bed Mobility 1: Supine to sitting  Level of Assistance 1: Maximum assistance (x2)  Bed Mobility 2  Bed Mobility  2: Sitting to supine  Level of Assistance 2: Maximum assistance (x2)  Transfers  Transfer: Yes  Transfer  1  Trials/Comments 1: pt. performed 4 sit<>stands at bedside. mod A x2 to max A x2 to stand with the FWW. pt. unble to place LLE on ground d/t chronic LE impairment, pain. pt. displaying tightness in hamstrings. Therapists facilitated weight shifting to attempt to take steps. pt. unsteady and unable to progress towards taking steps. mod retro lean and RLEW sliding forward. required blocking.         Strength:  Strength Comments: RUE ROM WFL, LUE shoulder/elbow ROM WFL, R hand grasp increased flexor tone, increased time and effort needed to open grasp/extend fingers to grasp walker      Outcome Measures: [facility] Daily Activity  Putting on and taking off regular lower body clothing: Total  Bathing (including washing, rinsing, drying): A lot  Putting on and taking off regular upper body clothing: A lot  Toileting, which includes using toilet, bedpan or urinal: Total  Taking care of personal grooming such as brushing teeth: A lot  Eating Meals: A lot  Daily Activity - Total Score: 10    Education Documentation  Body Mechanics, taught by Jenny Benitez OT at 10/12/2023  4:11 PM.  Learner: Patient  Readiness: Acceptance  Method: Explanation  Response: Needs Reinforcement    Education Comments  No comments found.               Goals:  Encounter Problems       Encounter Problems (Active)       ADLs       Patient will complete daily grooming tasks brushing teeth and washing face/hair with stand by assist level of assistance and PRN adaptive equipment while supported sitting. (Progressing)       Start:  10/12/23    Expected End:  10/26/23            Patient will complete toileting including hygiene clothing management/hygiene with moderate assist level of assistance. (Progressing)       Start:  10/12/23    Expected End:  10/26/23                       TRANSFERS       Patient will complete functional transfer with front wheeled walker with minimal assist  level of assistance. (Progressing)       Start:  10/12/23    Expected  End:  10/26/23

## 2023-10-12 NOTE — CONSULTS
"Nutrition Assessment Note  Reason for Assessment  Reason for Assessment: Provider consult order    History:    78 y.o. male presenting with past medical history of stroke with residual left-sided weakness who is mostly wheelchair-bound presented to the ER after a fall at home.  Pt with L hip Fx s/p L Hip CRPP on 10/7/23.    10/12:  Pt sleeping during time of visit, but wife present.  Noted breakfast tray in room untouched.  PO intake has been variable per records.  Will send supplements for trial.  Noted possible discharge today.    Energy Intake:  (variable)  Food and Nutrient History: Wife reports typically good PO intake at home    Current Diet: Adult diet Regular  Skin: incision    Anthropometrics:  Height: 175.3 cm (5' 9\")  Weight: 72.6 kg (160 lb)  BMI (Calculated): 23.62      Weight History / % Weight Change: Weight has been stable in past year per records.  Significant Weight Loss: No  Wt Readings from Last 10 Encounters:   10/06/23 72.6 kg (160 lb)   06/15/23 70.3 kg (155 lb)   06/15/23 70.8 kg (156 lb)   01/17/23 70.3 kg (155 lb)   12/15/22 70.3 kg (155 lb)   12/14/22 70.3 kg (155 lb)   10/27/22 70.3 kg (155 lb)   09/29/22 70.3 kg (155 lb)   07/12/22 71.7 kg (158 lb 2 oz)   04/19/22 73 kg (161 lb)            Labs:  Results from last 7 days   Lab Units 10/12/23  0405 10/11/23  0314 10/09/23  0519   GLUCOSE mg/dL 103* 102* 101*   SODIUM mmol/L 138 136 136   POTASSIUM mmol/L 3.9 4.0 4.0   CHLORIDE mmol/L 106 104 104   CO2 mmol/L 28 28 29   BUN mg/dL 18 19 19   CREATININE mg/dL 0.68 0.76 0.80   EGFR mL/min/1.73m*2 >90 >90 89   CALCIUM mg/dL 8.2* 7.9* 8.0*   MAGNESIUM mg/dL 1.76 1.74  --          Energy Needs:  Height: 175.3 cm (5' 9\")  Temp: 36.6 °C (97.8 °F)    Total Energy Estimated Needs (kCal): 1815 kCal  Total Estimated Energy Need per Day (kCal/kg): 25 kCal/kg    Total Protein Estimated Needs (g): 88 g  Total Protein Estimated Needs (g/kg): 1.2 g/kg    Total Fluid Estimated Needs (mL): 2190 mL  Total " Fluid Estimated Needs (mL/kg): 30 mL/kg         Nutrition Focused Physical Findings:  Subcutaneous Fat Loss  Orbital Fat Pads: Mild-Moderate (slight dark circles and slight hollowing) (Pt. asleep.  Age related wasting noted.)  Buccal Fat Pads: Mild-Moderate (flat cheeks, minimal bounce)    Muscle Wasting  Temporalis: Mild-Moderate (slight depression)         Diagnosis        Patient has Nutrition Diagnosis: Yes  Diagnosis Status (1): New  Nutrition Diagnosis 1: Predicted inadequate energy intake  Related to (1): s/p L hip surgery, fatigue, and advanced age  As Evidenced by (1): PO intake <75%            Interventions/Recommendations   Individualized Nutrition Prescription Provided for : Provide Strawberry Ensure plus High protein BID with meals    Food and/or Nutrient Delivery Interventions  Medical food supplement      Education Documentation  No documentation found.    Reviewed menu, supplements, and protein intake to promote healing with wife.     Monitoring and Evaluation   Food and Nutrient Related History       ~Will continue to monitor po intakes and tolerance, weight, I/Os, labs, skin, GI tolerance

## 2023-10-12 NOTE — DISCHARGE SUMMARY
Discharge Diagnosis  Closed fracture of left hip, initial encounter (CMS/[facility])    Issues Requiring Follow-Up  1.  Please follow-up with your family doctor in 1 weeks after rehab  2.  Please follow in the orthopedic clinic in 2 weeks time.    Discharge Meds     Your medication list        ASK your doctor about these medications        Instructions Last Dose Given Next Dose Due   atorvastatin 40 mg tablet  Commonly known as: Lipitor      Take 1 tablet (40 mg) by mouth once daily.       clopidogrel 75 mg tablet  Commonly known as: Plavix      Take 1 tablet (75 mg) by mouth once daily.       cyanocobalamin 1,000 mcg tablet  Commonly known as: Vitamin B-12           ergocalciferol 1.25 MG (19446 UT) capsule  Commonly known as: Vitamin D-2      Take 1 capsule (50,000 Units) by mouth 1 (one) time per week.       folic acid 0.8 mg capsule           levothyroxine 112 mcg tablet  Commonly known as: Synthroid, Levoxyl      Take 1.5 tablet on Sundays and 1 tablet all other days       melatonin 3 mg capsule           oxybutynin 5 mg tablet  Commonly known as: Ditropan      Take 1 tablet (5 mg) by mouth in the morning and 1 tablet (5 mg) in the evening and 1 tablet (5 mg) before bedtime.       sertraline 100 mg tablet  Commonly known as: Zoloft      Take 1 tablet (100 mg) by mouth once daily.                Test Results Pending At Discharge  Pending Labs       No current pending labs.            Hospital Course   Saleem Alba is a 78 y.o. 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 + Head strike (CTH neg). CXR neg. X-ray of the left hip and left femur did confirm impacted left subcapital femoral fracture. Discharge planning: Plan discharge to skilled nursing facility awaiting referral/authorization. Abnormal findings concerning vertebral body and prostate discussed in  "detail with patient and patient's wife recommend close follow-up with family physician. CXR with bilateral blunting of costophrenic angles.   10/11: No acute events overnight. Vitals stable, 89% RA- placed back on 1L, tmax 100.4. CXR with blunting bilaterally, no pneumonia or pneumothorax, encourage IS. UA negative for UTI. Hgb 8.1 (8.6, 9.1, 10.6, 13.5). B12 272- supplement. Iron/TIBC low- supplement IV iron. Add Senna-S BID.  10/12: No acute events overnight.  Patient denies any pain.  She denies any shortness of breath, chest pain or palpitation or lightheadedness.  Denies any abdominal pain, nausea or vomiting.  We are currently awaiting prior authorization to skilled nursing facility.  Addendum, prior authorization was obtained.  Plan discharge to skilled nursing facility today.  See full discharge orders.  Patient still follow-up with orthopedic clinic in 2 weeks time.    Discharge planning took more than 35 minutes.    Closed fracture of left hip, initial encounter (CMS/[facility])  S/p status post left hip CRPP 10/7/23  PT/OT- dc planning SNF  Orthopedics consult- recommentations are as follows:  \"-50% weight bearing LLE.  -Recommend Lovenox x6 weeks for DVT prophylaxis.  -Staples removed 2 weeks after surgery.  This should be around 10/21/2023.  This can be completed at his rehab facility or at ortho office depending on where the patient is at that time.  -Follow-up at ortho office in [place] on 10/20/2023 for evaluation of wound and x-ray.  This is pending his discharge from his rehab facility.  The scheduling number is [phone].\"  10/12: Awaiting prior authorization to skilled nursing facility     Acute blood loss anemia  Secondary to fracture  Monitor H/H  Supplement B12 and iron  Hold SQ Lovenox  10/12: H&H stable     Cerebral infarction (CMS/[facility])  Baseline L weakness and dysarthria     Enlarged prostate  Needs outpatient follow up with PCP for PSA and SUZY     Hypothyroidism  Continue home medications   "   Vitamin D deficiency  Continue home medicaiton      Protein calorie malnutrition (CMS/HCC)  Clinically diagnosed  Dietitian consult     Acute hypoxic respiratory failure (CMS/HCC)  CXR with some atelectasis  Continue to wean oxygen as able  89% on RA this morning therefore placed on 1L again  No evidence pf PNA or pneumothorax on CXR  Encourage IS      DVT ppx: Hold Lovenox given anemia  Pertinent Physical Exam At Time of Discharge  Physical Exam  CONSTITUTIONAL -pleasant frail elderly female, alert, in no acute distress, not ill-appearing  SKIN - normal skin color ,warm  HEAD - no trauma, normocephalic  EYES - pupils are equal and reactive to light  CHEST - clear to auscultation, no wheezing, no crackles and no rales, good effort  CARDIAC - regular rate and regular rhythm, no murmur  ABDOMEN - no organomegaly, soft, nontender, nondistended, normal bowel sounds, no guarding/rebound/rigidity  EXTREMITIES - no edema, no deformities  NEUROLOGICAL - alert, oriented x3 and no acute focal signs  PSYCHIATRIC - alert, pleasant and cordial, age-appropriate    Outpatient Follow-Up  Future Appointments   Date Time Provider Department Center   6/17/2024 11:30 AM HIREN Corrales-CNP [id number]         Unruly Casiano MD

## 2023-10-12 NOTE — CARE PLAN
Problem: ADLs  Goal: Patient will complete daily grooming tasks brushing teeth and washing face/hair with stand by assist level of assistance and PRN adaptive equipment while supported sitting.  Outcome: Progressing  Goal: Patient will complete toileting including hygiene clothing management/hygiene with moderate assist level of assistance.  Outcome: Progressing     Problem: TRANSFERS  Goal: Patient will complete functional transfer with front wheeled walker with minimal assist  level of assistance.  Outcome: Progressing

## 2023-10-13 NOTE — OP NOTE
Hip Fracture ORIF w/Nail Recon (L) Operative Note     Date: 10/7/2023  OR Location: [facility] OR    Name: Saleem Alba, : 1945, Age: 78 y.o., MRN: [id number], Sex: male    Diagnosis  Left hip valgus impacted femoral neck fracture    Procedures  Left hip closed reduction and percutaneous pinning    Surgeons      * Donnie Meek - Primary    Resident/Fellow/Other Assistant:  No surgical staff documented.    Procedure Summary  Anesthesia: General  ASA: III  Anesthesia Staff: Anesthesiologist: Gulshan Ray MD  Estimated Blood Loss: 175 mL  Intra-op Medications: * No intraprocedure medications in log *      Intraprocedure I/O Totals       None           Specimen: No specimens collected     Staff:   Circulator: Alexy Erwin RN; Megan He RN  Relief Scrub: Stacy Metcalf RN  Scrub Person: Thania Cardona RN      Drains and/or Catheters:   External Urinary Catheter Male (Active)   Collection Container Other (Comment) 10/10/23 1042   Securement Method Other (Comment) 10/10/23 1042   Output (mL) 500 mL 10/11/23 1616       [REMOVED] External Urinary Catheter Male (Removed)   Output (mL) 550 mL 10/08/23 0500       Tourniquet Times: N/A        Implants:  Implants       Type Name Action Serial No.      Screw SCREW, NATANAEL 6.5 X 110 20/THR TI ASNIS III - [id number] Wasted      Screw SCREW, NATANAEL 6.5 X 95 20/THR TI ASNIS III - [id number] Implanted      Screw SCREW, NATANAEL 6.5 X 85 20/THR TI ASNIS III - [id number] Implanted      Screw WASHER, ASNIS III, FOR 6.5 X 8.0 SCREWS - [id number] Implanted               Findings: Left hip valgus impacted femoral neck fracture    Indications: Saleem Alba is an 78 y.o. male who is having surgery for a left hip valgus impacted femoral neck fracture.    The patient was seen in the preoperative area. The risks, benefits, complications, treatment options, non-operative alternatives, expected recovery and outcomes were discussed with the patient. The possibilities of reaction to medication,  pulmonary aspiration, injury to surrounding structures, bleeding, recurrent infection, the need for additional procedures, failure to diagnose a condition, and creating a complication requiring transfusion or operation were discussed with the patient. The patient concurred with the proposed plan, giving informed consent.  The site of surgery was properly noted/marked if necessary per policy. The patient has been actively warmed in preoperative area. Preoperative antibiotics have been ordered and given within 1 hours of incision. Venous thrombosis prophylaxis have been ordered including bilateral sequential compression devices    Procedure Details:   The patient was identified in the preoperative holding room.  The operative extremity was then marked by the attending surgeon. The patient was then taken to the operating room and the patient was administered general anesthesia. The patient was then positioned in a supine position and all bony prominences were properly padded and the patient was secured to the table in the usual fashion. The operative extremity was then prepped and draped in the usual sterile fashion. A surgical timeout was taken to confirm the correct patient, procedure, extremity, and laterality.      Appropriate reduction of the femoral neck fracture was confirmed on fluoroscopic imaging prior to the start of the procedure. A 4cm incision was made at the level of the lesser trochanter and dissection was carried down through subcutaneous tissues down to the level of the iliotibial band. The iliotibial band was incised longitudinally and a periosteal elevator was used to create a longitudinal split in the vastus lateralus. Next, the starting point for an inferior guidepin was established just proximal to the level of the lesser trochanter and centered on the femur in the sagittal plane. The guidepin was advanced under fluoroscopic guidance to an inferior position in the subchondral bone of the femoral  head. Next, anterosuperior and posterosuperior guidepins were placed in in a similar fashion. An appropriately sized drill was used to open the cortex of the inferior screw and an appropriately sized cannulated partially threaded screw with washer was placed over the guidewire. This process was repeated for both the anterosuperior and posterosuperior screws. Final reduction and implant position was checked on fluoroscopic imaging with acceptable reduction and screw position. The wound was irrigated and the iliotibial band, subcutaneous tissues and skin were closed with 0-vicryl, 2-0 vicryl and the skin was stapled. The wound was dressed with an aquacel dressing.     The patient was then awoken from anesthesia without complication and taken to the recovery room in stable condition. There were no complications and all counts were correct.      Complications:  None; patient tolerated the procedure well.    Disposition: PACU - hemodynamically stable.  Condition: stable       Additional Details: None.    Attending Attestation: I was present and scrubbed for the entire procedure.    Donnie Meek  Phone Number: [phone]

## 2023-10-20 ENCOUNTER — APPOINTMENT (OUTPATIENT)
Dept: ORTHOPEDIC SURGERY | Facility: HOSPITAL | Age: 78
End: 2023-10-20
Payer: MEDICARE

## 2023-10-30 DIAGNOSIS — E78.2 MIXED HYPERLIPIDEMIA: ICD-10-CM

## 2023-10-30 DIAGNOSIS — R32 URINARY INCONTINENCE, UNSPECIFIED TYPE: ICD-10-CM

## 2023-10-30 DIAGNOSIS — F32.A DEPRESSIVE DISORDER: ICD-10-CM

## 2023-10-30 DIAGNOSIS — I73.9 PERIPHERAL VASCULAR DISEASE, UNSPECIFIED (CMS-HCC): ICD-10-CM

## 2023-10-31 RX ORDER — OXYBUTYNIN CHLORIDE 5 MG/1
5 TABLET ORAL 3 TIMES DAILY
Qty: 90 TABLET | Refills: 0 | Status: SHIPPED | OUTPATIENT
Start: 2023-10-31

## 2023-10-31 RX ORDER — SERTRALINE HYDROCHLORIDE 100 MG/1
100 TABLET, FILM COATED ORAL DAILY
Qty: 30 TABLET | Refills: 0 | Status: SHIPPED | OUTPATIENT
Start: 2023-10-31

## 2023-10-31 RX ORDER — CLOPIDOGREL BISULFATE 75 MG/1
75 TABLET ORAL DAILY
Qty: 30 TABLET | Refills: 0 | Status: SHIPPED | OUTPATIENT
Start: 2023-10-31

## 2023-10-31 RX ORDER — ATORVASTATIN CALCIUM 40 MG/1
40 TABLET, FILM COATED ORAL DAILY
Qty: 30 TABLET | Refills: 0 | Status: SHIPPED | OUTPATIENT
Start: 2023-10-31 | End: 2023-11-30

## 2023-11-06 ENCOUNTER — HOSPITAL ENCOUNTER (OUTPATIENT)
Dept: RADIOLOGY | Facility: HOSPITAL | Age: 78
Discharge: HOME | End: 2023-11-06
Payer: MEDICARE

## 2023-11-06 DIAGNOSIS — M81.8 POSTSURGICAL MALABSORPTION OSTEOPOROSIS WITHOUT PATHOLOGICAL FRACTURE: ICD-10-CM

## 2023-11-06 PROCEDURE — 73502 X-RAY EXAM HIP UNI 2-3 VIEWS: CPT | Mod: LT,FY

## 2023-11-06 PROCEDURE — 73502 X-RAY EXAM HIP UNI 2-3 VIEWS: CPT | Mod: LEFT SIDE | Performed by: RADIOLOGY

## 2023-11-10 ENCOUNTER — APPOINTMENT (OUTPATIENT)
Dept: ORTHOPEDIC SURGERY | Facility: HOSPITAL | Age: 78
End: 2023-11-10
Payer: MEDICARE

## 2023-11-17 NOTE — PROGRESS NOTES
Patient s/p L hip CRPP on 10/7/2023. Initial follow up visit missed on 10/20/2023. Spoke with patient's wife as well as  at nursing home on 10/20/2023 to establish new follow up visit (scheduled for 10/27/2023). Also instructed staples to be removed 14 days post-op. Instructions written and faxed to nursing home on 10/20/2023.    Subsequent visit on 10/27/2023 was missed and rescheduled for Friday 11/24/2023.    XR obtained on 11/6/2023 reviewed with intact hardware into the L femoral neck.

## 2023-11-23 DIAGNOSIS — Z98.890 STATUS POST SURGERY: Primary | ICD-10-CM

## 2023-11-24 ENCOUNTER — HOSPITAL ENCOUNTER (OUTPATIENT)
Dept: RADIOLOGY | Facility: HOSPITAL | Age: 78
Discharge: HOME | End: 2023-11-24
Payer: MEDICARE

## 2023-11-24 ENCOUNTER — OFFICE VISIT (OUTPATIENT)
Dept: ORTHOPEDIC SURGERY | Facility: HOSPITAL | Age: 78
End: 2023-11-24
Payer: MEDICARE

## 2023-11-24 VITALS — BODY MASS INDEX: 21.67 KG/M2 | HEIGHT: 68 IN | WEIGHT: 143 LBS

## 2023-11-24 DIAGNOSIS — Z98.890 STATUS POST SURGERY: ICD-10-CM

## 2023-11-24 DIAGNOSIS — E03.9 HYPOTHYROIDISM, UNSPECIFIED TYPE: ICD-10-CM

## 2023-11-24 DIAGNOSIS — Z98.890 STATUS POST SURGERY: Primary | ICD-10-CM

## 2023-11-24 PROCEDURE — 99024 POSTOP FOLLOW-UP VISIT: CPT | Performed by: STUDENT IN AN ORGANIZED HEALTH CARE EDUCATION/TRAINING PROGRAM

## 2023-11-24 PROCEDURE — 73502 X-RAY EXAM HIP UNI 2-3 VIEWS: CPT | Mod: LEFT SIDE | Performed by: RADIOLOGY

## 2023-11-24 PROCEDURE — 1036F TOBACCO NON-USER: CPT | Performed by: STUDENT IN AN ORGANIZED HEALTH CARE EDUCATION/TRAINING PROGRAM

## 2023-11-24 PROCEDURE — 73502 X-RAY EXAM HIP UNI 2-3 VIEWS: CPT | Mod: LT

## 2023-11-24 PROCEDURE — 1159F MED LIST DOCD IN RCRD: CPT | Performed by: STUDENT IN AN ORGANIZED HEALTH CARE EDUCATION/TRAINING PROGRAM

## 2023-11-24 PROCEDURE — 1126F AMNT PAIN NOTED NONE PRSNT: CPT | Performed by: STUDENT IN AN ORGANIZED HEALTH CARE EDUCATION/TRAINING PROGRAM

## 2023-11-24 ASSESSMENT — PAIN - FUNCTIONAL ASSESSMENT: PAIN_FUNCTIONAL_ASSESSMENT: NO/DENIES PAIN

## 2023-11-28 RX ORDER — LEVOTHYROXINE SODIUM 112 UG/1
TABLET ORAL
Qty: 70 TABLET | Refills: 2 | Status: SHIPPED | OUTPATIENT
Start: 2023-11-28

## 2023-11-28 NOTE — TELEPHONE ENCOUNTER
Called pt, pt spouse answered and she stated that pt is in nursing home and they are prescribing meds right now. They will call us when ready to schedule an appointment with [facility]. Please refuse meds until then

## 2023-12-01 NOTE — PROGRESS NOTES
PRIMARY CARE PHYSICIAN: Mohan Hernandez DO    ORTHOPAEDIC POSTOPERATIVE VISIT    ASSESSMENT & PLAN    Impression: 78 y.o. male 8 weeks postop s/p left hip CRPP.  Overall, the patient is doing well.  He remains in a nursing home.  Pain to the left hip has resolved.  His staples have been removed.  He is ambulating with the assistance of a walker/cane.    Plan:   He can continue weightbearing as tolerated left lower extremity.  He has completed his 6 weeks of Lovenox treatment.  Any further DVT prophylaxis to be determined based on the need of his medical team and the nursing home.    I reviewed the intraoperative findings with the patient and answered all their questions. I reviewed their postoperative timeline and plan with them. They understand the postoperative precautions and the treatment plan going forward.     Follow-Up: Patient will follow-up 1/12/2024.    At the end of the visit, all questions were answered in full. The patient is in agreement with the plan and recommendations. They will call the office with any questions/concerns.    Note dictated with Aprimo software. Completed without full typed error editing and sent to avoid delay.    SUBJECTIVE  CHIEF COMPLAINT:   Chief Complaint   Patient presents with    Left Hip - Post-op        HPI: Saleem Alba is a 78 y.o. patient. Saleem Alba is now 8 weeks postop status post left hip CRPP.  The patient is ambulating with minimal discomfort.  His pain to the left hip is controlled.  No issues with his incision.  He is longer on narcotics.  He remains admitted to the nursing home for rehab and further management.  He is accompanied today by the workers at the nursing.    REVIEW OF SYSTEMS  Constitutional: See HPI for pain assessment, No significant weight loss, recent trauma  Cardiovascular: No chest pain, shortness of breath  Respiratory: No difficulty breathing, cough  Gastrointestinal: No nausea, vomiting, diarrhea,  "constipation  Musculoskeletal: Noted in HPI, positive for pain, restricted motion, stiffness  Integumentary: No rashes, easy bruising, redness   Neurological: no numbness or tingling in extremities, no gait disturbances   Psychiatric: No mood changes, memory changes, social issues  Heme/Lymph: no excessive swelling, easy bruising, excessive bleeding  ENT: No hearing changes  Eyes: No vision changes    CURRENT MEDICATIONS:   Current Outpatient Medications   Medication Sig Dispense Refill    atorvastatin (Lipitor) 40 mg tablet Take 1 tablet (40 mg) by mouth once daily. 30 tablet 0    clopidogrel (Plavix) 75 mg tablet TAKE 1 TABLET BY MOUTH ONCE  DAILY 30 tablet 0    cyanocobalamin (Vitamin B-12) 1,000 mcg tablet Take by mouth.      folic acid 0.8 mg capsule Take by mouth.      levothyroxine (Synthroid, Levoxyl) 112 mcg tablet TAKE 1 AND 1/2 TABLETS BY MOUTH  ON SUNDAYS AND 1 TABLET BY MOUTH ALL OTHER DAYS 70 tablet 2    oxybutynin (Ditropan) 5 mg tablet TAKE 1 TABLET BY MOUTH 3 TIMES  DAILY 90 tablet 0    sennosides-docusate sodium (Nora-Colace) 8.6-50 mg tablet Take 1 tablet by mouth 2 times a day. 6 tablet 0    sertraline (Zoloft) 100 mg tablet Take 1 tablet (100 mg) by mouth once daily. 30 tablet 0     No current facility-administered medications for this visit.        OBJECTIVE    PHYSICAL EXAM      10/12/2023     1:21 AM 10/12/2023     4:47 AM 10/12/2023     9:20 AM 10/12/2023     1:21 PM 10/12/2023     1:54 PM 10/12/2023     5:24 PM 11/24/2023     2:41 PM   Vitals   Systolic 117 101 105 106  94    Diastolic 62 57 60 56  56    Heart Rate 58 56 57 56 60 61    Temp 37.7 °C (99.8 °F) 37.6 °C (99.6 °F) 36.6 °C (97.8 °F) 37 °C (98.6 °F)  37.2 °C (99 °F)    Resp 16 16 16 17  16    Height (in)       1.727 m (5' 8\")   Weight (lb)       143   BMI       21.74 kg/m2   BSA (m2)       1.76 m2   Visit Report       Report      Body mass index is 21.74 kg/m².    General: Well-appearing, no acute distress    Skin intact bilateral " upper and lower extremities  No erythema  No warmth    Detailed examination of left hip demonstrates:  Surgical incision healing well  No erythema or warmth  No drainage  No ecchymosis  Resolving swelling, minimal  Proximal hamstring tendon is palpable and intact  Lower extremity motor grossly intact  L4-S1 sensation intact bilaterally  2+ DP/PT pulses bilaterally  Warm and well-perfused, brisk capillary refill    IMAGING:   XR left hip with well placed CRPP hardware without complication.    Adryan Meek MD, MPH  Attending Surgeon    Sports Medicine Orthopaedic Surgery  [facility] [employer]

## 2023-12-21 ENCOUNTER — DOCUMENTATION (OUTPATIENT)
Dept: HOME HEALTH SERVICES | Facility: HOME HEALTH | Age: 78
End: 2023-12-21
Payer: MEDICARE

## 2023-12-21 ENCOUNTER — HOME HEALTH ADMISSION (OUTPATIENT)
Dept: HOME HEALTH SERVICES | Facility: HOME HEALTH | Age: 78
End: 2023-12-21
Payer: MEDICARE

## 2023-12-21 NOTE — HH CARE COORDINATION
Home Care received a Referral for Nursing, Physical Therapy, and Occupational Therapy. We have processed the referral for a Start of Care on 24-48 post dc of 12/22/2023.     If you have any questions or concerns, please feel free to contact us at [phone]. Follow the prompts, enter your five digit zip code, and you will be directed to your care team on [facility].

## 2023-12-27 ENCOUNTER — PATIENT OUTREACH (OUTPATIENT)
Dept: PRIMARY CARE | Facility: CLINIC | Age: 78
End: 2023-12-27
Payer: MEDICARE

## 2023-12-27 ENCOUNTER — TELEPHONE (OUTPATIENT)
Dept: PRIMARY CARE | Facility: CLINIC | Age: 78
End: 2023-12-27
Payer: MEDICARE

## 2023-12-27 NOTE — TELEPHONE ENCOUNTER
Next available appt is 1/25/24. Is this okay? Please advise on where to schedule patient for hosp follow up please

## 2023-12-27 NOTE — PROGRESS NOTES
Discharge Facility: [facility]   Discharge Diagnosis: Orthopedic Aftercare   Admission Date: 10-26-23   Discharge Date: 12-22-23     PCP Appointment Date: Message routed to office   Specialist Appointment Date: 1-12-24 Ortho follow up   Hospital Encounter and Summary: [facility]   See discharge assessment below for further details    Engagement  Call Start Time: 1415 (12/27/2023  2:17 PM)    Medications  Medications reviewed with patient/caregiver?: Yes (12/27/2023  2:17 PM)  Is the patient having any side effects they believe may be caused by any medication additions or changes?: No (12/27/2023  2:17 PM)  Does the patient have all medications ordered at discharge?: Yes (12/27/2023  2:17 PM)  Is the patient taking all medications as directed (includes completed medication regime)?: Yes (12/27/2023  2:17 PM)  Care Management Interventions: Provided patient education (12/27/2023  2:17 PM)    Appointments  Does the patient have a primary care provider?: Yes (12/27/2023  2:17 PM)  Care Management Interventions: Advised patient to make appointment (12/27/2023  2:17 PM)    Self Management  What is the home health agency?:  [facility] (12/27/2023  2:17 PM)  Has home health visited the patient within 72 hours of discharge?: Yes (12/27/2023  2:17 PM)    Patient Teaching  Does the patient have access to their discharge instructions?: Yes (12/27/2023  2:17 PM)  Care Management Interventions: Reviewed instructions with patient (12/27/2023  2:17 PM)  What is the patient's perception of their health status since discharge?: Same (12/27/2023  2:17 PM)  Patient/Caregiver Education Comments: Pt wife requeting for home health care to wait unit the first of the year to start thearpy. (12/27/2023  2:17 PM)      Lakisha Vogt LPN

## 2023-12-27 NOTE — TELEPHONE ENCOUNTER
----- Message from Marni Méndez MA sent at 12/27/2023  2:21 PM EST -----  Regarding: FW: Hospital d/c    ----- Message -----  From: Lakisha Vogt LPN  Sent: 12/27/2023   2:21 PM EST  To: #  Subject: Hospital d/c                                     Discharge Facility: [facility]   Discharge Diagnosis: Orthopedic Aftercare   Admission Date: 10-26-23   Discharge Date: 12-22-23     No PCP appointments available within 14 days of discharge.  Please reach out to patient and schedule an appointment within 7-13 days from discharge date.    Pt. Wife declined home health care from  until after the first of the year.     This nurse tried to educate pt. Wife she states its only a few days.     Pt. Wife states he is not doing any better at this time. .    Lakisha Vogt LPN

## 2024-01-01 ENCOUNTER — HOME CARE VISIT (OUTPATIENT)
Dept: HOME HEALTH SERVICES | Facility: HOME HEALTH | Age: 79
End: 2024-01-01
Payer: MEDICARE

## 2024-01-01 ENCOUNTER — HOSPITAL ENCOUNTER (EMERGENCY)
Facility: HOSPITAL | Age: 79
End: 2024-07-25
Attending: EMERGENCY MEDICINE
Payer: MEDICARE

## 2024-01-01 ENCOUNTER — APPOINTMENT (OUTPATIENT)
Dept: RADIOLOGY | Facility: HOSPITAL | Age: 79
End: 2024-01-01
Payer: MEDICARE

## 2024-01-01 VITALS — OXYGEN SATURATION: 97 % | HEART RATE: 80 BPM | TEMPERATURE: 97.4 F

## 2024-01-01 DIAGNOSIS — I46.9 CARDIOPULMONARY ARREST (MULTI): Primary | ICD-10-CM

## 2024-01-01 DIAGNOSIS — J81.0 ACUTE PULMONARY EDEMA (MULTI): ICD-10-CM

## 2024-01-01 PROCEDURE — G0157 HHC PT ASSISTANT EA 15: HCPCS | Mod: CQ,HHH

## 2024-01-01 PROCEDURE — 94002 VENT MGMT INPAT INIT DAY: CPT | Mod: 59

## 2024-01-01 PROCEDURE — 92950 HEART/LUNG RESUSCITATION CPR: CPT

## 2024-01-01 PROCEDURE — 94681 O2 UPTK CO2 OUTP % O2 XTRC: CPT | Mod: 59

## 2024-01-01 PROCEDURE — 36415 COLL VENOUS BLD VENIPUNCTURE: CPT | Performed by: EMERGENCY MEDICINE

## 2024-01-01 PROCEDURE — 96365 THER/PROPH/DIAG IV INF INIT: CPT

## 2024-01-01 PROCEDURE — 31500 INSERT EMERGENCY AIRWAY: CPT | Performed by: EMERGENCY MEDICINE

## 2024-01-01 PROCEDURE — 2500000004 HC RX 250 GENERAL PHARMACY W/ HCPCS (ALT 636 FOR OP/ED): Performed by: EMERGENCY MEDICINE

## 2024-01-01 PROCEDURE — 2500000005 HC RX 250 GENERAL PHARMACY W/O HCPCS: Performed by: EMERGENCY MEDICINE

## 2024-01-01 PROCEDURE — G0152 HHCP-SERV OF OT,EA 15 MIN: HCPCS | Mod: HHH

## 2024-01-01 PROCEDURE — 99291 CRITICAL CARE FIRST HOUR: CPT | Performed by: EMERGENCY MEDICINE

## 2024-01-01 RX ORDER — KETAMINE HYDROCHLORIDE 50 MG/ML
100 INJECTION, SOLUTION INTRAMUSCULAR; INTRAVENOUS ONCE
Status: COMPLETED | OUTPATIENT
Start: 2024-01-01 | End: 2024-01-01

## 2024-01-01 RX ORDER — EPINEPHRINE 0.1 MG/ML
INJECTION INTRACARDIAC; INTRAVENOUS CODE/TRAUMA/SEDATION MEDICATION
Status: COMPLETED | OUTPATIENT
Start: 2024-01-01 | End: 2024-01-01

## 2024-01-01 RX ORDER — EPINEPHRINE 0.1 MG/ML
INJECTION INTRACARDIAC; INTRAVENOUS
Status: DISCONTINUED
Start: 2024-01-01 | End: 2024-07-25 | Stop reason: HOSPADM

## 2024-01-01 RX ORDER — KETAMINE HYDROCHLORIDE 50 MG/ML
150 INJECTION, SOLUTION INTRAMUSCULAR; INTRAVENOUS ONCE
Status: DISCONTINUED | OUTPATIENT
Start: 2024-01-01 | End: 2024-01-01

## 2024-01-01 RX ORDER — CALCIUM CHLORIDE INJECTION 100 MG/ML
INJECTION, SOLUTION INTRAVENOUS CODE/TRAUMA/SEDATION MEDICATION
Status: COMPLETED | OUTPATIENT
Start: 2024-01-01 | End: 2024-01-01

## 2024-01-01 RX ORDER — NOREPINEPHRINE BITARTRATE/D5W 8 MG/250ML
PLASTIC BAG, INJECTION (ML) INTRAVENOUS
Status: COMPLETED
Start: 2024-01-01 | End: 2024-01-01

## 2024-01-01 RX ORDER — ATROPINE SULFATE 0.1 MG/ML
INJECTION INTRAVENOUS CODE/TRAUMA/SEDATION MEDICATION
Status: COMPLETED | OUTPATIENT
Start: 2024-01-01 | End: 2024-01-01

## 2024-01-01 RX ORDER — SODIUM BICARBONATE 1 MEQ/ML
SYRINGE (ML) INTRAVENOUS
Status: DISCONTINUED
Start: 2024-01-01 | End: 2024-07-25 | Stop reason: HOSPADM

## 2024-01-01 RX ORDER — AMIODARONE HYDROCHLORIDE 150 MG/3ML
INJECTION, SOLUTION INTRAVENOUS CODE/TRAUMA/SEDATION MEDICATION
Status: COMPLETED | OUTPATIENT
Start: 2024-01-01 | End: 2024-01-01

## 2024-01-01 RX ORDER — INDOMETHACIN 25 MG/1
CAPSULE ORAL CODE/TRAUMA/SEDATION MEDICATION
Status: COMPLETED | OUTPATIENT
Start: 2024-01-01 | End: 2024-01-01

## 2024-01-01 RX ORDER — KETAMINE HYDROCHLORIDE 50 MG/ML
INJECTION, SOLUTION INTRAMUSCULAR; INTRAVENOUS
Status: COMPLETED
Start: 2024-01-01 | End: 2024-01-01

## 2024-01-01 RX ORDER — NOREPINEPHRINE BITARTRATE/D5W 8 MG/250ML
0-.2 PLASTIC BAG, INJECTION (ML) INTRAVENOUS CONTINUOUS
Status: DISCONTINUED | OUTPATIENT
Start: 2024-01-01 | End: 2024-07-25 | Stop reason: HOSPADM

## 2024-01-01 RX ORDER — NITROGLYCERIN 20 MG/100ML
INJECTION INTRAVENOUS
Status: DISCONTINUED
Start: 2024-01-01 | End: 2024-07-25 | Stop reason: HOSPADM

## 2024-01-01 RX ORDER — CALCIUM CHLORIDE INJECTION 100 MG/ML
INJECTION, SOLUTION INTRAVENOUS
Status: DISCONTINUED
Start: 2024-01-01 | End: 2024-07-25 | Stop reason: HOSPADM

## 2024-01-01 ASSESSMENT — ENCOUNTER SYMPTOMS
PERSON REPORTING PAIN: PATIENT
DENIES PAIN: 1

## 2024-01-01 ASSESSMENT — COLUMBIA-SUICIDE SEVERITY RATING SCALE - C-SSRS
1. IN THE PAST MONTH, HAVE YOU WISHED YOU WERE DEAD OR WISHED YOU COULD GO TO SLEEP AND NOT WAKE UP?: NO
2. HAVE YOU ACTUALLY HAD ANY THOUGHTS OF KILLING YOURSELF?: NO

## 2024-01-01 ASSESSMENT — PAIN - FUNCTIONAL ASSESSMENT: PAIN_FUNCTIONAL_ASSESSMENT: 0-10

## 2024-01-01 ASSESSMENT — PAIN SCALES - GENERAL: PAINLEVEL_OUTOF10: 0 - NO PAIN

## 2024-01-05 ENCOUNTER — APPOINTMENT (OUTPATIENT)
Dept: RADIOLOGY | Facility: HOSPITAL | Age: 79
DRG: 948 | End: 2024-01-05
Payer: MEDICARE

## 2024-01-05 ENCOUNTER — HOME CARE VISIT (OUTPATIENT)
Dept: HOME HEALTH SERVICES | Facility: HOME HEALTH | Age: 79
End: 2024-01-05

## 2024-01-05 ENCOUNTER — APPOINTMENT (OUTPATIENT)
Dept: CARDIOLOGY | Facility: HOSPITAL | Age: 79
DRG: 948 | End: 2024-01-05
Payer: MEDICARE

## 2024-01-05 ENCOUNTER — HOSPITAL ENCOUNTER (INPATIENT)
Facility: HOSPITAL | Age: 79
LOS: 3 days | Discharge: NURSING, REHAB OR LONG-TERM CARE | DRG: 948 | End: 2024-01-11
Attending: STUDENT IN AN ORGANIZED HEALTH CARE EDUCATION/TRAINING PROGRAM | Admitting: INTERNAL MEDICINE
Payer: MEDICARE

## 2024-01-05 DIAGNOSIS — I69.322 DYSARTHRIA DUE TO OLD STROKE: Chronic | ICD-10-CM

## 2024-01-05 DIAGNOSIS — R53.1 GENERALIZED WEAKNESS: Primary | ICD-10-CM

## 2024-01-05 DIAGNOSIS — R53.81 PHYSICAL DECONDITIONING: Chronic | ICD-10-CM

## 2024-01-05 DIAGNOSIS — R13.12 OROPHARYNGEAL DYSPHAGIA: ICD-10-CM

## 2024-01-05 PROBLEM — E46 PROTEIN CALORIE MALNUTRITION (MULTI): Chronic | Status: ACTIVE | Noted: 2023-10-11

## 2024-01-05 PROBLEM — N40.0 ENLARGED PROSTATE: Chronic | Status: ACTIVE | Noted: 2023-10-10

## 2024-01-05 PROBLEM — R26.2 AMBULATORY DYSFUNCTION: Chronic | Status: ACTIVE | Noted: 2024-01-05

## 2024-01-05 PROBLEM — E53.8 COBALAMIN DEFICIENCY: Chronic | Status: ACTIVE | Noted: 2019-08-13

## 2024-01-05 LAB
ALBUMIN SERPL BCP-MCNC: 3.7 G/DL (ref 3.4–5)
ALP SERPL-CCNC: 63 U/L (ref 33–136)
ALT SERPL W P-5'-P-CCNC: 13 U/L (ref 10–52)
ANION GAP SERPL CALC-SCNC: 9 MMOL/L (ref 10–20)
APPEARANCE UR: CLEAR
AST SERPL W P-5'-P-CCNC: 16 U/L (ref 9–39)
BASOPHILS # BLD AUTO: 0.05 X10*3/UL (ref 0–0.1)
BASOPHILS NFR BLD AUTO: 0.6 %
BILIRUB SERPL-MCNC: 0.4 MG/DL (ref 0–1.2)
BILIRUB UR STRIP.AUTO-MCNC: NEGATIVE MG/DL
BNP SERPL-MCNC: 150 PG/ML (ref 0–99)
BUN SERPL-MCNC: 26 MG/DL (ref 6–23)
CALCIUM SERPL-MCNC: 9.2 MG/DL (ref 8.6–10.3)
CARDIAC TROPONIN I PNL SERPL HS: 9 NG/L (ref 0–20)
CHLORIDE SERPL-SCNC: 104 MMOL/L (ref 98–107)
CO2 SERPL-SCNC: 29 MMOL/L (ref 21–32)
COLOR UR: YELLOW
CREAT SERPL-MCNC: 0.85 MG/DL (ref 0.5–1.3)
EOSINOPHIL # BLD AUTO: 0.31 X10*3/UL (ref 0–0.4)
EOSINOPHIL NFR BLD AUTO: 3.9 %
ERYTHROCYTE [DISTWIDTH] IN BLOOD BY AUTOMATED COUNT: 13.7 % (ref 11.5–14.5)
GFR SERPL CREATININE-BSD FRML MDRD: 88 ML/MIN/1.73M*2
GLUCOSE SERPL-MCNC: 82 MG/DL (ref 74–99)
GLUCOSE UR STRIP.AUTO-MCNC: NEGATIVE MG/DL
HCT VFR BLD AUTO: 40.7 % (ref 41–52)
HGB BLD-MCNC: 13.3 G/DL (ref 13.5–17.5)
HOLD SPECIMEN: 293
IMM GRANULOCYTES # BLD AUTO: 0.04 X10*3/UL (ref 0–0.5)
IMM GRANULOCYTES NFR BLD AUTO: 0.5 % (ref 0–0.9)
KETONES UR STRIP.AUTO-MCNC: NEGATIVE MG/DL
LEUKOCYTE ESTERASE UR QL STRIP.AUTO: NEGATIVE
LYMPHOCYTES # BLD AUTO: 1.64 X10*3/UL (ref 0.8–3)
LYMPHOCYTES NFR BLD AUTO: 20.5 %
MAGNESIUM SERPL-MCNC: 1.79 MG/DL (ref 1.6–2.4)
MCH RBC QN AUTO: 30.1 PG (ref 26–34)
MCHC RBC AUTO-ENTMCNC: 32.7 G/DL (ref 32–36)
MCV RBC AUTO: 92 FL (ref 80–100)
MONOCYTES # BLD AUTO: 0.72 X10*3/UL (ref 0.05–0.8)
MONOCYTES NFR BLD AUTO: 9 %
NEUTROPHILS # BLD AUTO: 5.23 X10*3/UL (ref 1.6–5.5)
NEUTROPHILS NFR BLD AUTO: 65.5 %
NITRITE UR QL STRIP.AUTO: NEGATIVE
NRBC BLD-RTO: 0 /100 WBCS (ref 0–0)
PH UR STRIP.AUTO: 6 [PH]
PHOSPHATE SERPL-MCNC: 2.7 MG/DL (ref 2.5–4.9)
PLATELET # BLD AUTO: 261 X10*3/UL (ref 150–450)
POTASSIUM SERPL-SCNC: 3.9 MMOL/L (ref 3.5–5.3)
PROT SERPL-MCNC: 7.5 G/DL (ref 6.4–8.2)
PROT UR STRIP.AUTO-MCNC: NEGATIVE MG/DL
RBC # BLD AUTO: 4.42 X10*6/UL (ref 4.5–5.9)
RBC # UR STRIP.AUTO: NEGATIVE /UL
SODIUM SERPL-SCNC: 138 MMOL/L (ref 136–145)
SP GR UR STRIP.AUTO: 1.02
UROBILINOGEN UR STRIP.AUTO-MCNC: 2 MG/DL
WBC # BLD AUTO: 8 X10*3/UL (ref 4.4–11.3)

## 2024-01-05 PROCEDURE — 84484 ASSAY OF TROPONIN QUANT: CPT | Performed by: STUDENT IN AN ORGANIZED HEALTH CARE EDUCATION/TRAINING PROGRAM

## 2024-01-05 PROCEDURE — 93005 ELECTROCARDIOGRAM TRACING: CPT

## 2024-01-05 PROCEDURE — 71046 X-RAY EXAM CHEST 2 VIEWS: CPT | Mod: FR

## 2024-01-05 PROCEDURE — 2500000004 HC RX 250 GENERAL PHARMACY W/ HCPCS (ALT 636 FOR OP/ED): Performed by: STUDENT IN AN ORGANIZED HEALTH CARE EDUCATION/TRAINING PROGRAM

## 2024-01-05 PROCEDURE — 80053 COMPREHEN METABOLIC PANEL: CPT | Performed by: STUDENT IN AN ORGANIZED HEALTH CARE EDUCATION/TRAINING PROGRAM

## 2024-01-05 PROCEDURE — 85025 COMPLETE CBC W/AUTO DIFF WBC: CPT | Performed by: STUDENT IN AN ORGANIZED HEALTH CARE EDUCATION/TRAINING PROGRAM

## 2024-01-05 PROCEDURE — 84100 ASSAY OF PHOSPHORUS: CPT | Performed by: STUDENT IN AN ORGANIZED HEALTH CARE EDUCATION/TRAINING PROGRAM

## 2024-01-05 PROCEDURE — 81003 URINALYSIS AUTO W/O SCOPE: CPT | Performed by: STUDENT IN AN ORGANIZED HEALTH CARE EDUCATION/TRAINING PROGRAM

## 2024-01-05 PROCEDURE — 2500000001 HC RX 250 WO HCPCS SELF ADMINISTERED DRUGS (ALT 637 FOR MEDICARE OP): Performed by: STUDENT IN AN ORGANIZED HEALTH CARE EDUCATION/TRAINING PROGRAM

## 2024-01-05 PROCEDURE — 99285 EMERGENCY DEPT VISIT HI MDM: CPT | Performed by: STUDENT IN AN ORGANIZED HEALTH CARE EDUCATION/TRAINING PROGRAM

## 2024-01-05 PROCEDURE — 36415 COLL VENOUS BLD VENIPUNCTURE: CPT | Performed by: STUDENT IN AN ORGANIZED HEALTH CARE EDUCATION/TRAINING PROGRAM

## 2024-01-05 PROCEDURE — 99223 1ST HOSP IP/OBS HIGH 75: CPT | Performed by: STUDENT IN AN ORGANIZED HEALTH CARE EDUCATION/TRAINING PROGRAM

## 2024-01-05 PROCEDURE — 71046 X-RAY EXAM CHEST 2 VIEWS: CPT | Mod: FOREIGN READ | Performed by: RADIOLOGY

## 2024-01-05 PROCEDURE — 83880 ASSAY OF NATRIURETIC PEPTIDE: CPT | Performed by: STUDENT IN AN ORGANIZED HEALTH CARE EDUCATION/TRAINING PROGRAM

## 2024-01-05 PROCEDURE — 83735 ASSAY OF MAGNESIUM: CPT | Performed by: STUDENT IN AN ORGANIZED HEALTH CARE EDUCATION/TRAINING PROGRAM

## 2024-01-05 PROCEDURE — G0378 HOSPITAL OBSERVATION PER HR: HCPCS

## 2024-01-05 RX ORDER — ONDANSETRON 4 MG/1
4 TABLET, FILM COATED ORAL EVERY 8 HOURS PRN
Status: DISCONTINUED | OUTPATIENT
Start: 2024-01-05 | End: 2024-01-11 | Stop reason: HOSPADM

## 2024-01-05 RX ORDER — LANOLIN ALCOHOL/MO/W.PET/CERES
1000 CREAM (GRAM) TOPICAL DAILY
Status: DISCONTINUED | OUTPATIENT
Start: 2024-01-05 | End: 2024-01-11 | Stop reason: HOSPADM

## 2024-01-05 RX ORDER — TALC
3 POWDER (GRAM) TOPICAL DAILY
Status: DISCONTINUED | OUTPATIENT
Start: 2024-01-05 | End: 2024-01-11 | Stop reason: HOSPADM

## 2024-01-05 RX ORDER — ACETAMINOPHEN 160 MG/5ML
650 SUSPENSION ORAL EVERY 4 HOURS PRN
Status: CANCELLED | OUTPATIENT
Start: 2024-01-05

## 2024-01-05 RX ORDER — SERTRALINE HYDROCHLORIDE 50 MG/1
100 TABLET, FILM COATED ORAL DAILY
Status: DISCONTINUED | OUTPATIENT
Start: 2024-01-05 | End: 2024-01-11 | Stop reason: HOSPADM

## 2024-01-05 RX ORDER — OXYBUTYNIN CHLORIDE 5 MG/1
5 TABLET ORAL 3 TIMES DAILY
Status: DISCONTINUED | OUTPATIENT
Start: 2024-01-05 | End: 2024-01-05

## 2024-01-05 RX ORDER — LEVOTHYROXINE SODIUM 125 UG/1
125 TABLET ORAL
COMMUNITY

## 2024-01-05 RX ORDER — CLOPIDOGREL BISULFATE 75 MG/1
75 TABLET ORAL DAILY
Status: DISCONTINUED | OUTPATIENT
Start: 2024-01-05 | End: 2024-01-11 | Stop reason: HOSPADM

## 2024-01-05 RX ORDER — ACETAMINOPHEN 325 MG/1
650 TABLET ORAL EVERY 4 HOURS PRN
Status: DISCONTINUED | OUTPATIENT
Start: 2024-01-05 | End: 2024-01-11 | Stop reason: HOSPADM

## 2024-01-05 RX ORDER — ONDANSETRON HYDROCHLORIDE 2 MG/ML
4 INJECTION, SOLUTION INTRAVENOUS EVERY 8 HOURS PRN
Status: DISCONTINUED | OUTPATIENT
Start: 2024-01-05 | End: 2024-01-11 | Stop reason: HOSPADM

## 2024-01-05 RX ORDER — BISACODYL 10 MG/1
10 SUPPOSITORY RECTAL DAILY PRN
Status: DISCONTINUED | OUTPATIENT
Start: 2024-01-05 | End: 2024-01-11 | Stop reason: HOSPADM

## 2024-01-05 RX ORDER — ACETAMINOPHEN 325 MG/1
650 TABLET ORAL EVERY 4 HOURS PRN
Status: CANCELLED | OUTPATIENT
Start: 2024-01-05

## 2024-01-05 RX ORDER — POLYETHYLENE GLYCOL 3350 17 G/17G
17 POWDER, FOR SOLUTION ORAL DAILY
Status: DISCONTINUED | OUTPATIENT
Start: 2024-01-05 | End: 2024-01-11 | Stop reason: HOSPADM

## 2024-01-05 RX ORDER — OXYBUTYNIN CHLORIDE 5 MG/1
5 TABLET ORAL 3 TIMES DAILY
Status: DISCONTINUED | OUTPATIENT
Start: 2024-01-05 | End: 2024-01-11 | Stop reason: HOSPADM

## 2024-01-05 RX ORDER — BISACODYL 5 MG
10 TABLET, DELAYED RELEASE (ENTERIC COATED) ORAL DAILY PRN
Status: DISCONTINUED | OUTPATIENT
Start: 2024-01-05 | End: 2024-01-11 | Stop reason: HOSPADM

## 2024-01-05 RX ORDER — LEVOTHYROXINE SODIUM 125 UG/1
125 TABLET ORAL
Status: DISCONTINUED | OUTPATIENT
Start: 2024-01-06 | End: 2024-01-11 | Stop reason: HOSPADM

## 2024-01-05 RX ORDER — ACETAMINOPHEN 650 MG/1
650 SUPPOSITORY RECTAL EVERY 4 HOURS PRN
Status: CANCELLED | OUTPATIENT
Start: 2024-01-05

## 2024-01-05 RX ORDER — PANTOPRAZOLE SODIUM 40 MG/10ML
40 INJECTION, POWDER, LYOPHILIZED, FOR SOLUTION INTRAVENOUS
Status: DISCONTINUED | OUTPATIENT
Start: 2024-01-06 | End: 2024-01-11 | Stop reason: HOSPADM

## 2024-01-05 RX ORDER — GUAIFENESIN 600 MG/1
600 TABLET, EXTENDED RELEASE ORAL EVERY 12 HOURS PRN
Status: DISCONTINUED | OUTPATIENT
Start: 2024-01-05 | End: 2024-01-11 | Stop reason: HOSPADM

## 2024-01-05 RX ORDER — PANTOPRAZOLE SODIUM 40 MG/1
40 TABLET, DELAYED RELEASE ORAL
Status: DISCONTINUED | OUTPATIENT
Start: 2024-01-06 | End: 2024-01-11 | Stop reason: HOSPADM

## 2024-01-05 RX ORDER — TROSPIUM CHLORIDE 20 MG/1
20 TABLET, FILM COATED ORAL 2 TIMES DAILY
COMMUNITY
End: 2024-02-27

## 2024-01-05 RX ORDER — ATORVASTATIN CALCIUM 40 MG/1
40 TABLET, FILM COATED ORAL DAILY
Status: DISCONTINUED | OUTPATIENT
Start: 2024-01-05 | End: 2024-01-11 | Stop reason: HOSPADM

## 2024-01-05 RX ADMIN — ATORVASTATIN CALCIUM 40 MG: 40 TABLET, FILM COATED ORAL at 21:56

## 2024-01-05 RX ADMIN — Medication 3 MG: at 21:56

## 2024-01-05 RX ADMIN — OXYBUTYNIN CHLORIDE 5 MG: 5 TABLET ORAL at 21:56

## 2024-01-05 RX ADMIN — CYANOCOBALAMIN TAB 1000 MCG 1000 MCG: 1000 TAB at 21:56

## 2024-01-05 RX ADMIN — GUAIFENESIN 600 MG: 600 TABLET ORAL at 21:55

## 2024-01-05 RX ADMIN — SERTRALINE HYDROCHLORIDE 100 MG: 50 TABLET ORAL at 21:56

## 2024-01-05 RX ADMIN — CLOPIDOGREL 75 MG: 75 TABLET ORAL at 21:56

## 2024-01-05 SDOH — SOCIAL STABILITY: SOCIAL INSECURITY: DO YOU FEEL ANYONE HAS EXPLOITED OR TAKEN ADVANTAGE OF YOU FINANCIALLY OR OF YOUR PERSONAL PROPERTY?: NO

## 2024-01-05 SDOH — SOCIAL STABILITY: SOCIAL INSECURITY: ARE THERE ANY APPARENT SIGNS OF INJURIES/BEHAVIORS THAT COULD BE RELATED TO ABUSE/NEGLECT?: NO

## 2024-01-05 SDOH — SOCIAL STABILITY: SOCIAL INSECURITY: ARE YOU OR HAVE YOU BEEN THREATENED OR ABUSED PHYSICALLY, EMOTIONALLY, OR SEXUALLY BY ANYONE?: NO

## 2024-01-05 SDOH — SOCIAL STABILITY: SOCIAL INSECURITY: DO YOU FEEL UNSAFE GOING BACK TO THE PLACE WHERE YOU ARE LIVING?: NO

## 2024-01-05 SDOH — SOCIAL STABILITY: SOCIAL INSECURITY: ABUSE: ADULT

## 2024-01-05 SDOH — SOCIAL STABILITY: SOCIAL INSECURITY: HAVE YOU HAD THOUGHTS OF HARMING ANYONE ELSE?: NO

## 2024-01-05 SDOH — SOCIAL STABILITY: SOCIAL INSECURITY: HAS ANYONE EVER THREATENED TO HURT YOUR FAMILY OR YOUR PETS?: NO

## 2024-01-05 SDOH — SOCIAL STABILITY: SOCIAL INSECURITY: DOES ANYONE TRY TO KEEP YOU FROM HAVING/CONTACTING OTHER FRIENDS OR DOING THINGS OUTSIDE YOUR HOME?: NO

## 2024-01-05 SDOH — SOCIAL STABILITY: SOCIAL INSECURITY: WERE YOU ABLE TO COMPLETE ALL THE BEHAVIORAL HEALTH SCREENINGS?: YES

## 2024-01-05 ASSESSMENT — ACTIVITIES OF DAILY LIVING (ADL)
ASSISTIVE_DEVICE: WALKER
BATHING: NEEDS ASSISTANCE
DRESSING YOURSELF: NEEDS ASSISTANCE
HEARING - RIGHT EAR: DIFFICULTY WITH NOISE
TOILETING: NEEDS ASSISTANCE
HEARING - LEFT EAR: DIFFICULTY WITH NOISE
WALKS IN HOME: NEEDS ASSISTANCE
LACK_OF_TRANSPORTATION: NO
GROOMING: NEEDS ASSISTANCE
PATIENT'S MEMORY ADEQUATE TO SAFELY COMPLETE DAILY ACTIVITIES?: YES
ADEQUATE_TO_COMPLETE_ADL: YES
FEEDING YOURSELF: NEEDS ASSISTANCE
JUDGMENT_ADEQUATE_SAFELY_COMPLETE_DAILY_ACTIVITIES: YES

## 2024-01-05 ASSESSMENT — ENCOUNTER SYMPTOMS
SINUS PAIN: 0
WHEEZING: 0
DIZZINESS: 0
FEVER: 0
WEAKNESS: 1
SINUS PRESSURE: 0
RHINORRHEA: 0
PALPITATIONS: 0
BACK PAIN: 1
VOMITING: 0
SEIZURES: 0
HEADACHES: 0
DIAPHORESIS: 0
CONSTIPATION: 0
COUGH: 0
ACTIVITY CHANGE: 1
SHORTNESS OF BREATH: 0
BLOOD IN STOOL: 0
FATIGUE: 1
NAUSEA: 0
DIARRHEA: 0
ABDOMINAL PAIN: 0
CHEST TIGHTNESS: 0
SORE THROAT: 0
FACIAL ASYMMETRY: 1
NUMBNESS: 0
LIGHT-HEADEDNESS: 0
APPETITE CHANGE: 0
SPEECH DIFFICULTY: 1
ABDOMINAL DISTENTION: 0

## 2024-01-05 ASSESSMENT — PATIENT HEALTH QUESTIONNAIRE - PHQ9
2. FEELING DOWN, DEPRESSED OR HOPELESS: NOT AT ALL
1. LITTLE INTEREST OR PLEASURE IN DOING THINGS: NOT AT ALL
SUM OF ALL RESPONSES TO PHQ9 QUESTIONS 1 & 2: 0

## 2024-01-05 ASSESSMENT — COGNITIVE AND FUNCTIONAL STATUS - GENERAL
PERSONAL GROOMING: A LITTLE
DRESSING REGULAR UPPER BODY CLOTHING: A LITTLE
MOVING FROM LYING ON BACK TO SITTING ON SIDE OF FLAT BED WITH BEDRAILS: A LITTLE
DRESSING REGULAR LOWER BODY CLOTHING: A LOT
CLIMB 3 TO 5 STEPS WITH RAILING: TOTAL
WALKING IN HOSPITAL ROOM: A LOT
MOVING TO AND FROM BED TO CHAIR: A LOT
PATIENT BASELINE BEDBOUND: NO
HELP NEEDED FOR BATHING: A LOT
TOILETING: A LITTLE
MOBILITY SCORE: 12
DAILY ACTIVITIY SCORE: 17
TURNING FROM BACK TO SIDE WHILE IN FLAT BAD: A LOT
STANDING UP FROM CHAIR USING ARMS: A LOT

## 2024-01-05 ASSESSMENT — LIFESTYLE VARIABLES
HOW OFTEN DO YOU HAVE A DRINK CONTAINING ALCOHOL: NEVER
HOW OFTEN DO YOU HAVE 6 OR MORE DRINKS ON ONE OCCASION: NEVER
SKIP TO QUESTIONS 9-10: 1
AUDIT-C TOTAL SCORE: 0
AUDIT-C TOTAL SCORE: 0
HOW MANY STANDARD DRINKS CONTAINING ALCOHOL DO YOU HAVE ON A TYPICAL DAY: PATIENT DOES NOT DRINK

## 2024-01-05 ASSESSMENT — COLUMBIA-SUICIDE SEVERITY RATING SCALE - C-SSRS
1. IN THE PAST MONTH, HAVE YOU WISHED YOU WERE DEAD OR WISHED YOU COULD GO TO SLEEP AND NOT WAKE UP?: NO
6. HAVE YOU EVER DONE ANYTHING, STARTED TO DO ANYTHING, OR PREPARED TO DO ANYTHING TO END YOUR LIFE?: NO
2. HAVE YOU ACTUALLY HAD ANY THOUGHTS OF KILLING YOURSELF?: NO

## 2024-01-05 ASSESSMENT — PAIN - FUNCTIONAL ASSESSMENT: PAIN_FUNCTIONAL_ASSESSMENT: 0-10

## 2024-01-05 ASSESSMENT — PAIN SCALES - GENERAL
PAINLEVEL_OUTOF10: 0 - NO PAIN
PAINLEVEL_OUTOF10: 0 - NO PAIN

## 2024-01-05 NOTE — ED PROVIDER NOTES
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  History is largely provided by patient's wife and son who are present at bedside due to patient's dysarthria.  Patient's wife states that in October, he suffered a hip fracture, was admitted to this hospital, and underwent surgery.  He was then discharged to a rehab facility however had a prolonged course there as he was not making progress as quickly as they felt that he should be therefore went to a skilled nursing facility from rehab instead of going home as was originally planned.  He most recently came home from [facility] about 1 week ago.  Patient's wife states that 1 week ago when he came home, he was able to ambulate very slowly with a walker and assistance however over the last few days, he has been unable to ambulate entirely.  He has not had any falls but has had a couple episodes where family has had to catch him as he was about to fall if they had not intervened.  They state that patient is even having difficulty sitting upright at the dinner table on his own as well as transferring from a wheelchair to the toilet.  They deny any trauma, travel, or illness since patient came home from [place].  Otherwise patient eating and drinking well, having regular bowel movements.  No fever, cough.      History provided by:  Patient and relative  History limited by: Patient baseline residual dysarthria.   used: No                Cement Coma Scale Score: 15                  Patient History   Past Medical History:   Diagnosis Date    Personal history of other  diseases of the digestive system     History of gastroesophageal reflux (GERD)    Personal history of other endocrine, nutritional and metabolic disease     History of hypercholesterolemia    Personal history of other mental and behavioral disorders     History of depression    Personal history of transient ischemic attack (TIA), and cerebral infarction without residual deficits     History of cerebrovascular accident     Past Surgical History:   Procedure Laterality Date    MR HEAD ANGIO WO IV CONTRAST  3/13/2023    MR HEAD ANGIO WO IV CONTRAST POR MRI    MR NECK ANGIO WO IV CONTRAST  2022    MR NECK ANGIO WO IV CONTRAST 2022 POR ANCILLARY LEGACY    MR NECK ANGIO WO IV CONTRAST  3/13/2023    MR NECK ANGIO WO IV CONTRAST POR MRI    OTHER SURGICAL HISTORY  2022    Inguinal hernia repair     Family History   Problem Relation Name Age of Onset    Heart attack Mother      Coronary artery disease Mother      Coronary artery disease Brother      Heart attack Brother      Diabetes Other       Social History     Tobacco Use    Smoking status: Former     Packs/day: 1     Types: Cigarettes     Quit date:      Years since quittin.0    Smokeless tobacco: Never   Substance Use Topics    Alcohol use: Never    Drug use: Never       Physical Exam     Physical Exam  Constitutional:       General: He is not in acute distress.     Appearance: He is well-developed. He is not toxic-appearing.   HENT:      Head: Normocephalic and atraumatic.      Mouth/Throat:      Mouth: Mucous membranes are moist.   Eyes:      Conjunctiva/sclera: Conjunctivae normal.      Pupils: Pupils are equal, round, and reactive to light.   Cardiovascular:      Rate and Rhythm: Regular rhythm. Bradycardia present.      Pulses: Normal pulses.      Heart sounds: Normal heart sounds.   Pulmonary:      Effort: Pulmonary effort is normal.      Breath sounds: Normal breath sounds.   Abdominal:      General: There is no distension.      Palpations:  Abdomen is soft.      Tenderness: There is no abdominal tenderness.   Genitourinary:     Comments: Depends in place  Musculoskeletal:         General: Normal range of motion.      Cervical back: Neck supple.   Skin:     General: Skin is warm and dry.   Neurological:      Mental Status: He is alert and oriented to person, place, and time. Mental status is at baseline.      Motor: Motor function is intact.      Comments: Chronic dysarthria and left-sided weakness present secondary to prior CVA         Labs Reviewed   CBC WITH AUTO DIFFERENTIAL   PHOSPHORUS   MAGNESIUM   COMPREHENSIVE METABOLIC PANEL   TROPONIN I, HIGH SENSITIVITY   B-TYPE NATRIURETIC PEPTIDE   URINALYSIS WITH REFLEX CULTURE AND MICROSCOPIC    Narrative:     The following orders were created for panel order Urinalysis with Reflex Culture and Microscopic.  Procedure                               Abnormality         Status                     ---------                               -----------         ------                     Urinalysis with Reflex C...[[id number]]                                                 Extra Urine Gray Tube[[id number]]                                                         Please view results for these tests on the individual orders.   URINALYSIS WITH REFLEX CULTURE AND MICROSCOPIC   EXTRA URINE GRAY TUBE       ED Course & MDM   ED Course as of 01/08/24 1404   Fri Jan 05, 2024   1828 EKG at 1811 on my independent interpretation: Sinus rhythm, rate 55 bpm, slight left axis deviation, right bundle branch block, QTc 456 ms, no STEMI.  Unchanged when compared with prior EKG from October 2023. 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  Here, patient is at his baseline mental status, baseline dysarthria and left-sided weakness are present and at baseline according to family who is present at bedside.  No new focal weakness is identified on examination.  No signs of trauma present on exam.  Laboratory workup here overall benign, normal WBC, no UTI, normal electrolytes, normal troponin.  Twelve-lead EKG nonischemic on my independent interpretation as documented above.  At this point, family is unable to care for patient despite home health nursing and will likely need longer stay at acute rehab/skilled nursing facility.  Patient and family agreeable with admission for facility placement, admitted to the [facility] service in stable condition.          Procedure  Procedures    Abbie Manuel DO  Emergency Medicine     Abbie Manuel DO  01/08/24 1408

## 2024-01-06 LAB
ANION GAP SERPL CALC-SCNC: 9 MMOL/L (ref 10–20)
BUN SERPL-MCNC: 27 MG/DL (ref 6–23)
CALCIUM SERPL-MCNC: 8.5 MG/DL (ref 8.6–10.3)
CHLORIDE SERPL-SCNC: 108 MMOL/L (ref 98–107)
CO2 SERPL-SCNC: 26 MMOL/L (ref 21–32)
CREAT SERPL-MCNC: 0.84 MG/DL (ref 0.5–1.3)
ERYTHROCYTE [DISTWIDTH] IN BLOOD BY AUTOMATED COUNT: 13.8 % (ref 11.5–14.5)
GFR SERPL CREATININE-BSD FRML MDRD: 88 ML/MIN/1.73M*2
GLUCOSE SERPL-MCNC: 82 MG/DL (ref 74–99)
HCT VFR BLD AUTO: 36.5 % (ref 41–52)
HGB BLD-MCNC: 11.9 G/DL (ref 13.5–17.5)
MAGNESIUM SERPL-MCNC: 1.74 MG/DL (ref 1.6–2.4)
MCH RBC QN AUTO: 29.7 PG (ref 26–34)
MCHC RBC AUTO-ENTMCNC: 32.6 G/DL (ref 32–36)
MCV RBC AUTO: 91 FL (ref 80–100)
NRBC BLD-RTO: 0 /100 WBCS (ref 0–0)
PLATELET # BLD AUTO: 227 X10*3/UL (ref 150–450)
POTASSIUM SERPL-SCNC: 3.7 MMOL/L (ref 3.5–5.3)
RBC # BLD AUTO: 4.01 X10*6/UL (ref 4.5–5.9)
SODIUM SERPL-SCNC: 139 MMOL/L (ref 136–145)
WBC # BLD AUTO: 7.5 X10*3/UL (ref 4.4–11.3)

## 2024-01-06 PROCEDURE — 80048 BASIC METABOLIC PNL TOTAL CA: CPT | Performed by: STUDENT IN AN ORGANIZED HEALTH CARE EDUCATION/TRAINING PROGRAM

## 2024-01-06 PROCEDURE — 99233 SBSQ HOSP IP/OBS HIGH 50: CPT | Performed by: INTERNAL MEDICINE

## 2024-01-06 PROCEDURE — 2500000004 HC RX 250 GENERAL PHARMACY W/ HCPCS (ALT 636 FOR OP/ED): Performed by: STUDENT IN AN ORGANIZED HEALTH CARE EDUCATION/TRAINING PROGRAM

## 2024-01-06 PROCEDURE — 36415 COLL VENOUS BLD VENIPUNCTURE: CPT | Performed by: STUDENT IN AN ORGANIZED HEALTH CARE EDUCATION/TRAINING PROGRAM

## 2024-01-06 PROCEDURE — G0378 HOSPITAL OBSERVATION PER HR: HCPCS

## 2024-01-06 PROCEDURE — 83735 ASSAY OF MAGNESIUM: CPT | Performed by: STUDENT IN AN ORGANIZED HEALTH CARE EDUCATION/TRAINING PROGRAM

## 2024-01-06 PROCEDURE — 97530 THERAPEUTIC ACTIVITIES: CPT | Mod: GP

## 2024-01-06 PROCEDURE — 97535 SELF CARE MNGMENT TRAINING: CPT | Mod: GO

## 2024-01-06 PROCEDURE — 97167 OT EVAL HIGH COMPLEX 60 MIN: CPT | Mod: GO

## 2024-01-06 PROCEDURE — 2500000001 HC RX 250 WO HCPCS SELF ADMINISTERED DRUGS (ALT 637 FOR MEDICARE OP): Performed by: STUDENT IN AN ORGANIZED HEALTH CARE EDUCATION/TRAINING PROGRAM

## 2024-01-06 PROCEDURE — 97162 PT EVAL MOD COMPLEX 30 MIN: CPT | Mod: GP

## 2024-01-06 PROCEDURE — 85027 COMPLETE CBC AUTOMATED: CPT | Performed by: STUDENT IN AN ORGANIZED HEALTH CARE EDUCATION/TRAINING PROGRAM

## 2024-01-06 RX ADMIN — POLYETHYLENE GLYCOL 3350 17 G: 17 POWDER, FOR SOLUTION ORAL at 08:01

## 2024-01-06 RX ADMIN — LEVOTHYROXINE SODIUM 125 MCG: 125 TABLET ORAL at 07:00

## 2024-01-06 RX ADMIN — PANTOPRAZOLE SODIUM 40 MG: 40 TABLET, DELAYED RELEASE ORAL at 07:00

## 2024-01-06 RX ADMIN — OXYBUTYNIN CHLORIDE 5 MG: 5 TABLET ORAL at 20:48

## 2024-01-06 RX ADMIN — Medication 3 MG: at 20:48

## 2024-01-06 RX ADMIN — CLOPIDOGREL 75 MG: 75 TABLET ORAL at 08:02

## 2024-01-06 RX ADMIN — GUAIFENESIN 600 MG: 600 TABLET ORAL at 08:01

## 2024-01-06 RX ADMIN — GUAIFENESIN 600 MG: 600 TABLET ORAL at 20:48

## 2024-01-06 RX ADMIN — SERTRALINE HYDROCHLORIDE 100 MG: 50 TABLET ORAL at 08:02

## 2024-01-06 RX ADMIN — OXYBUTYNIN CHLORIDE 5 MG: 5 TABLET ORAL at 08:02

## 2024-01-06 RX ADMIN — CYANOCOBALAMIN TAB 1000 MCG 1000 MCG: 1000 TAB at 08:01

## 2024-01-06 RX ADMIN — ATORVASTATIN CALCIUM 40 MG: 40 TABLET, FILM COATED ORAL at 08:01

## 2024-01-06 RX ADMIN — OXYBUTYNIN CHLORIDE 5 MG: 5 TABLET ORAL at 14:03

## 2024-01-06 ASSESSMENT — COGNITIVE AND FUNCTIONAL STATUS - GENERAL
MOBILITY SCORE: 6
HELP NEEDED FOR BATHING: A LOT
MOVING TO AND FROM BED TO CHAIR: TOTAL
MOVING TO AND FROM BED TO CHAIR: A LOT
EATING MEALS: A LITTLE
WALKING IN HOSPITAL ROOM: A LOT
MOVING FROM LYING ON BACK TO SITTING ON SIDE OF FLAT BED WITH BEDRAILS: TOTAL
TOILETING: A LITTLE
TOILETING: A LITTLE
MOVING FROM LYING ON BACK TO SITTING ON SIDE OF FLAT BED WITH BEDRAILS: TOTAL
DRESSING REGULAR UPPER BODY CLOTHING: A LITTLE
DRESSING REGULAR LOWER BODY CLOTHING: A LOT
MOBILITY SCORE: 12
DRESSING REGULAR UPPER BODY CLOTHING: A LITTLE
CLIMB 3 TO 5 STEPS WITH RAILING: TOTAL
STANDING UP FROM CHAIR USING ARMS: TOTAL
MOBILITY SCORE: 6
DAILY ACTIVITIY SCORE: 16
DAILY ACTIVITIY SCORE: 16
MOVING TO AND FROM BED TO CHAIR: TOTAL
WALKING IN HOSPITAL ROOM: TOTAL
CLIMB 3 TO 5 STEPS WITH RAILING: TOTAL
TURNING FROM BACK TO SIDE WHILE IN FLAT BAD: TOTAL
PERSONAL GROOMING: A LITTLE
EATING MEALS: A LITTLE
WALKING IN HOSPITAL ROOM: TOTAL
DRESSING REGULAR LOWER BODY CLOTHING: A LOT
HELP NEEDED FOR BATHING: A LOT
TURNING FROM BACK TO SIDE WHILE IN FLAT BAD: A LOT
PERSONAL GROOMING: A LITTLE
STANDING UP FROM CHAIR USING ARMS: A LOT
STANDING UP FROM CHAIR USING ARMS: TOTAL
MOVING FROM LYING ON BACK TO SITTING ON SIDE OF FLAT BED WITH BEDRAILS: A LITTLE
CLIMB 3 TO 5 STEPS WITH RAILING: TOTAL
TURNING FROM BACK TO SIDE WHILE IN FLAT BAD: TOTAL

## 2024-01-06 ASSESSMENT — PAIN SCALES - GENERAL
PAINLEVEL_OUTOF10: 0 - NO PAIN

## 2024-01-06 ASSESSMENT — PAIN - FUNCTIONAL ASSESSMENT
PAIN_FUNCTIONAL_ASSESSMENT: 0-10

## 2024-01-06 ASSESSMENT — ACTIVITIES OF DAILY LIVING (ADL): HOME_MANAGEMENT_TIME_ENTRY: 8

## 2024-01-06 NOTE — CARE PLAN
The patient's goals for the shift include  safety    The clinical goals for the shift include patient will remain free from falls or injury throughout shift      Problem: Fall/Injury  Goal: Not fall by end of shift  Outcome: Progressing  Goal: Be free from injury by end of the shift  Outcome: Progressing  Goal: Verbalize understanding of personal risk factors for fall in the hospital  Outcome: Progressing  Goal: Verbalize understanding of risk factor reduction measures to prevent injury from fall in the home  Outcome: Progressing  Goal: Use assistive devices by end of the shift  Outcome: Progressing  Goal: Pace activities to prevent fatigue by end of the shift  Outcome: Progressing     Problem: Pain  Goal: My pain/discomfort is manageable  Outcome: Progressing     Problem: Safety  Goal: Patient will be injury free during hospitalization  Outcome: Progressing  Goal: I will remain free of falls  Outcome: Progressing

## 2024-01-06 NOTE — PROGRESS NOTES
Physical Therapy    Physical Therapy Evaluation    Patient Name: Saleem Alba  MRN: [id number]  Today's Date: 1/6/2024   Time Calculation  Start Time: 1537  Stop Time: 1607  Time Calculation (min): 30 min    Assessment/Plan   PT Assessment  PT Assessment Results: Decreased strength, Decreased range of motion, Decreased endurance, Impaired balance, Decreased mobility, Decreased coordination, Impaired judgement, Decreased safety awareness, Decreased skin integrity, Impaired tone  Rehab Prognosis: Fair  Evaluation/Treatment Tolerance: Patient limited by fatigue, Other (Comment) (fear of falling)  Medical Staff Made Aware: Yes  Strengths: Support of Caregivers  Barriers to Participation: Comorbidities  End of Session Communication: Bedside nurse  Assessment Comment: Per chart pt wasable to ambulate and participate in tansfers with assist x1 using AD at home. At [facility] pt required Max A x2 for transfers using anterior approach and mod A x2 for bed mobility. Unable to safeely attempt gait at this time. Pt demonstrates limitted ROM in LUE and LLE, impaired tone, impaired coordination and balance, and generalized weakness increasing fall risk and dependence on staff/caregivers. Would benefit from PT services to address the identified impairments in order to restore PLOF  End of Session Patient Position: Bed, 3 rail up, Alarm on  IP OR SWING BED PT PLAN  Inpatient or Swing Bed: Inpatient  PT Plan  Treatment/Interventions: Bed mobility, Transfer training, Gait training, Balance training, Stair training, Strengthening, Endurance training, Neuromuscular re-education, Range of motion, Therapeutic exercise, Therapeutic activity, Orthotic fitting/training, Positioning, Wheelchair management  PT Plan: Skilled PT  PT Discharge Recommendations: Moderate intensity level of continued care  Equipment Recommended upon Discharge: Wheeled walker, Wheelchair  PT - OK to Discharge: Yes      Subjective   General Visit  Information:  General  Reason for Referral: impaired mobility  Referred By: violette dooley  Past Medical History Relevant to Rehab: PMHx: prior stroke with Lsided deficits and contractures, dysarthria, truncal weakness, BPH, L hip fracture 10/2023 (WBAT LLE as of 11/24/23), hypothyroidism  Family/Caregiver Present: No  Co-Treatment: OT  Co-Treatment Reason: to maximize pt  safety while focusing on discipline specific goals  Prior to Session Communication: Bedside nurse  Patient Position Received: Bed, 3 rail up, Alarm on  General Comment: Pt seen in room 2328. Agreeable to eval. Incontinent on arrival  Home Living:  Home Living  Home Living Comments: Lives with wife in 1 story home, [place] with HR. Hx of falls. Questionable historian, difficult to undersand due to dysarthria. Per chart pt needed assistance to ambulate with AD and assist from family/staff. Uses L AFO at baseline. (Was recently at [facility] recent L hip fracture in october 2023. was discharged home 1 wk ago and brought back to ED for diffficulty ambulating and weakness.)  Prior Level of Function:  Prior Function Per Pt/Caregiver Report  Level of [place]: Needs assistance with ADLs, Needs assistance with functional transfers  Receives Help From: Family  Prior Function Comments: L hand and foot contracture as well as L knee flexion contracture present  Precautions:  Precautions  Precautions Comment: fall risk, L sided weakness/contractures, dysarthria (L hip fracture OCT 2023 - WBAT LLE as of 11/24/23)  Vital Signs:       Objective   Pain:     Cognition:  Cognition  Overall Cognitive Status: Impaired  Orientation Level: Disoriented to situation  Safety/Judgement: Exceptions to WFL  Complex Functional Tasks: Maximal  Novel Situations: Maximal    General Assessments:  General Observation  General Observation: Fearful of poitional changes, reports fear of falling. Needs max encouragement to participate in all tasks. Demonstrates increased  resistance during transfers requiring repeated multimodal cues and reassurance               Activity Tolerance  Endurance: Tolerates less than 10 min exercise, no significant change in vital signs    Sensation  Light Touch:  (unable to formally assess due to pt cognition and dysarthria)    Strength  Strength Comments: WFL AROM at all joints on RLE, 3/5 strength RLE. L knee lacking 20-30 degrees of full extension. L ankle in full PF wih no active DF. L hip AROM/AAROM grossly WFL. L hip 3-/5 strength, L knee 2+/5 in availableROM, L ankle 0/5  Strength  Strength Comments: WFL AROM at all joints on RLE, 3/5 strength RLE. L knee lacking 20-30 degrees of full extension. L ankle in full PF wih no active DF. L hip AROM/AAROM grossly WFL. L hip 3-/5 strength, L knee 2+/5 in availableROM, L ankle 0/5           Coordination  Movements are Fluid and Coordinated: No  Coordination Comment: increased extensor tone noted during positional changes         Static Sitting Balance  Static Sitting-Balance Support: Feet supported, Right upper extremity supported  Static Sitting-Level of Assistance: Minimum assistance, Contact guard  Dynamic Sitting Balance  Dynamic Sitting-Balance Support: Feet supported, Right upper extremity supported  Dynamic Sitting-Balance: Trunk control activities  Dynamic Sitting-Comments: Max A x1 needed    Static Standing Balance  Static Standing-Balance Support: Right upper extremity supported  Static Standing-Level of Assistance: Dependent  Dynamic Standing Balance  Dynamic Standing-Balance Support: Right upper extremity supported  Dynamic Standing-Balance: Turning  Dynamic Standing-Comments: Max A x2  Functional Assessments:       Bed Mobility  Bed Mobility: Yes  Bed Mobility 1  Bed Mobility 1: Supine to sitting, Sitting to supine, Rolling right, Rolling left, Scooting  Level of Assistance 1: Dependent (mod A x2)    Transfers  Transfer: Yes  Transfer 1  Trials/Comments 1: bed<>chair using squat pivot tranfer  technique- required Max A x2    Ambulation/Gait Training  Ambulation/Gait Training Performed: No (deferred for medical/safety concerns due to poor participation during transfers)            Extremity/Trunk Assessments:                      Outcome Measures:  [facility] Basic Mobility  Turning from your back to your side while in a flat bed without using bedrails: Total  Moving from lying on your back to sitting on the side of a flat bed without using bedrails: Total  Moving to and from bed to chair (including a wheelchair): Total  Standing up from a chair using your arms (e.g. wheelchair or bedside chair): Total  To walk in hospital room: Total  Climbing 3-5 steps with railing: Total  Basic Mobility - Total Score: 6    Encounter Problems       Encounter Problems (Active)       Balance       STG - Maintains F dynamic sitting balance without upper extremity support (Progressing)       Start:  01/06/24    Expected End:  01/20/24       INTERVENTIONS:  1. Practice sitting on the edge of a bed/mat with minimal support.  2. Educate patient about maintining total hip precautions while maintaining balance.  3. Educate patient about pressure relief.  4. Educate patient about use of assistive device.            Transfers       STG - Transfer from bed to chair using LRD with mod/max A x1 (Progressing)       Start:  01/06/24    Expected End:  01/20/24            STG - Patient will perform bed mobility including rolling side to side and supine<> sit with mod/max A x1 (Progressing)       Start:  01/06/24    Expected End:  01/20/24                   Education Documentation  Mobility Training, taught by Kim Toure PT at 1/6/2024  4:27 PM.  Learner: Patient  Readiness: Acceptance  Method: Demonstration, Explanation  Response: Needs Reinforcement  Comment: education provided on safe sequencing of upper and lower body to improve participation in bed mobility and transfers    Education Comments  No comments found.      Therapeutic  interventions: Bed mobility training provided to increase coordination, mobility, assist pt with hygiene needs as pt was found to be incontinent of bladder on arrival, and prepare for EOB activities. Mod A x2 needed to roll R and L and perform supine<>sit. Cues for sequencing of upper and lower body given for max pt participation with poor+ carryover. Increased time needed to complete task. Transfer training provided to improve coordination, increase WB tolerance, and encourage time OOB. Pt required max Ax2 for squat pivot transfers from bed<>chair using anterior approach. Increased resistance noted from pt on initial attempt. Needed repeated multimodal cues for reassurance and max participation. Pt returned to bed at end of session with call light in reach and alarm on. RN notified of pt behaviors and status

## 2024-01-06 NOTE — H&P
History Of Present Illness:  Saleem Alba is a 78 y.o. 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 Patient was admitted to [place] in October for a hip fracture and following surgical repair and PT/OT evaluation, patient was discharged to SNF.  He had a prolonged course at the rehab facility as he was not making progress as quickly as they had originally planned, and only returned to home from facility about a week ago.  Once he originally returned home, he was able to ambulate around their house very slowly with a walker and assistance; however, over the last few days, he has been unable to ambulate entirely.  There have been multiple episodes where he was stumbling and too weak to continue, family noting that he would have fallen if they had not intervened and caught him.    ED Course (Summary):   Vitals on presentation: T98.2, /75, HR 52, RR 18, SpO2 95% RA  CBC: WBC 8.0, Hgb 13.3, platelets 261  CMP: Glucose 82, sodium 138, potassium 3.9, BUN 26, serum creatinine 0.85, mag 1.79  .  High-sensitivity troponin 9.  ECG with sinus rhythm, no acute ST-T changes, known right bundle branch; overall similar to prior in the system.  UA negative for infection  CXR with concern for a very small left pleural effusion but otherwise no obvious abnormalities    Her family at the bedside which included his wife, son, daughter and granddaughter they would prefer that the patient will remain at [facility] and inpatient rehab as they felt he got the best care there.  Family is not amenable to returning to [facility] skilled facility.  They state that they may be worked with him 20 minutes a day otherwise he would remain in bed  most of the time.  He also had numerous grievances with the nursing staff at the facility.  Patient's son had also experienced numerous grievances with his personal care here at the hospital as well as at other facilities.  Patient's did not express any of these concerns but does feel that he is getting weaker again.  The patient's son had also expressed concerns for recurrent strokelike symptoms given he is getting weaker again but this has been a progressive decline per his description ever since he was sent to [place] for SNF from [facility].    ED Course:  ED Course as of 01/05/24 2010 Fri Jan 05, 2024 1828 EKG at 1811 on my independent interpretation: Sinus rhythm, rate 55 bpm, slight left axis deviation, right bundle branch block, QTc 456 ms, no STEMI.  Unchanged when compared with prior EKG from October 2023. [EC]      ED Course User Index  [EC] Abbie ManuelDO         Diagnoses as of 01/05/24 2010   Generalized weakness     Relevant Results  Results for orders placed or performed during the hospital encounter of 01/05/24 (from the past 24 hour(s))   CBC and Auto Differential   Result Value Ref Range    WBC 8.0 4.4 - 11.3 x10*3/uL    nRBC 0.0 0.0 - 0.0 /100 WBCs    RBC 4.42 (L) 4.50 - 5.90 x10*6/uL    Hemoglobin 13.3 (L) 13.5 - 17.5 g/dL    Hematocrit 40.7 (L) 41.0 - 52.0 %    MCV 92 80 - 100 fL    MCH 30.1 26.0 - 34.0 pg    MCHC 32.7 32.0 - 36.0 g/dL    RDW 13.7 11.5 - 14.5 %    Platelets 261 150 - 450 x10*3/uL    Neutrophils % 65.5 40.0 - 80.0 %    Immature Granulocytes %, Automated 0.5 0.0 - 0.9 %    Lymphocytes % 20.5 13.0 - 44.0 %    Monocytes % 9.0 2.0 - 10.0 %    Eosinophils % 3.9 0.0 - 6.0 %    Basophils % 0.6 0.0 - 2.0 %    Neutrophils Absolute 5.23 1.60 - 5.50 x10*3/uL    Immature Granulocytes Absolute, Automated 0.04 0.00 - 0.50 x10*3/uL    Lymphocytes Absolute 1.64 0.80 - 3.00 x10*3/uL    Monocytes Absolute 0.72 0.05 - 0.80 x10*3/uL    Eosinophils Absolute 0.31 0.00 - 0.40 x10*3/uL    Basophils  Absolute 0.05 0.00 - 0.10 x10*3/uL   Phosphorus   Result Value Ref Range    Phosphorus 2.7 2.5 - 4.9 mg/dL   Magnesium   Result Value Ref Range    Magnesium 1.79 1.60 - 2.40 mg/dL   Comprehensive metabolic panel   Result Value Ref Range    Glucose 82 74 - 99 mg/dL    Sodium 138 136 - 145 mmol/L    Potassium 3.9 3.5 - 5.3 mmol/L    Chloride 104 98 - 107 mmol/L    Bicarbonate 29 21 - 32 mmol/L    Anion Gap 9 (L) 10 - 20 mmol/L    Urea Nitrogen 26 (H) 6 - 23 mg/dL    Creatinine 0.85 0.50 - 1.30 mg/dL    eGFR 88 >60 mL/min/1.73m*2    Calcium 9.2 8.6 - 10.3 mg/dL    Albumin 3.7 3.4 - 5.0 g/dL    Alkaline Phosphatase 63 33 - 136 U/L    Total Protein 7.5 6.4 - 8.2 g/dL    AST 16 9 - 39 U/L    Bilirubin, Total 0.4 0.0 - 1.2 mg/dL    ALT 13 10 - 52 U/L   Troponin I, High Sensitivity   Result Value Ref Range    Troponin I, High Sensitivity 9 0 - 20 ng/L   B-Type Natriuretic Peptide   Result Value Ref Range     (H) 0 - 99 pg/mL   Urinalysis with Reflex Culture and Microscopic   Result Value Ref Range    Color, Urine Yellow Straw, Yellow    Appearance, Urine Clear Clear    Specific Gravity, Urine 1.021 1.005 - 1.035    pH, Urine 6.0 5.0, 5.5, 6.0, 6.5, 7.0, 7.5, 8.0    Protein, Urine NEGATIVE NEGATIVE mg/dL    Glucose, Urine NEGATIVE NEGATIVE mg/dL    Blood, Urine NEGATIVE NEGATIVE    Ketones, Urine NEGATIVE NEGATIVE mg/dL    Bilirubin, Urine NEGATIVE NEGATIVE    Urobilinogen, Urine 2.0 (N) <2.0 mg/dL    Nitrite, Urine NEGATIVE NEGATIVE    Leukocyte Esterase, Urine NEGATIVE NEGATIVE   Extra Urine Gray Tube   Result Value Ref Range    Extra Tube 293       XR chest 2 views    Result Date: 1/5/2024  STUDY: Chest Radiographs;  01/05/2024 at 6:05 PM INDICATION: Weakness. COMPARISON: XR chest 10/11/23, 10/07/23, 03/12/23. ACCESSION NUMBER(S): [id number] ORDERING CLINICIAN: SANDRA FRANCIS TECHNIQUE:  Frontal and lateral chest. FINDINGS: CARDIOMEDIASTINAL SILHOUETTE: Cardiomediastinal silhouette is mildly enlarged.  LUNGS:  Suspect small left effusion.  A portion of this may be loculated laterally.  No lobar consolidation.  ABDOMEN: No remarkable upper abdominal findings.  BONES: No acute osseous changes.    Suspect small left pleural effusion. No lobar consolidation. Mild cardiomegaly. No CHF. No pneumothorax. Signed by Ebenezer Palomo MD     Scheduled medications:  atorvastatin, 40 mg, oral, Daily  clopidogrel, 75 mg, oral, Daily  cyanocobalamin, 1,000 mcg, oral, Daily  melatonin, 3 mg, oral, Daily  oxybutynin, 5 mg, oral, TID  [START ON 1/6/2024] pantoprazole, 40 mg, oral, Daily before breakfast   Or  [START ON 1/6/2024] pantoprazole, 40 mg, intravenous, Daily before breakfast  polyethylene glycol, 17 g, oral, Daily  sertraline, 100 mg, oral, Daily      Continuous medications:     PRN medications:  PRN medications: acetaminophen, bisacodyl, bisacodyl, guaiFENesin, ondansetron **OR** ondansetron      Past Medical History  He has a past medical history of Personal history of other diseases of the digestive system, Personal history of other endocrine, nutritional and metabolic disease, Personal history of other mental and behavioral disorders, and Personal history of transient ischemic attack (TIA), and cerebral infarction without residual deficits.    Surgical History  He has a past surgical history that includes Other surgical history (04/13/2022); MR angio neck wo IV contrast (6/7/2022); MR angio neck wo IV contrast (3/13/2023); and MR angio head wo IV contrast (3/13/2023).     Social History  He reports that he quit smoking about 17 years ago. His smoking use included cigarettes. He smoked an average of 1 pack per day. He has never used smokeless tobacco. He reports that he does not drink alcohol and does not use drugs.    Family History  Family History   Problem Relation Name Age of Onset    Heart attack Mother      Coronary artery disease Mother      Coronary artery disease Brother      Heart attack Brother      Diabetes Other           Allergies  Hexadrol    Code Status  Full Code     Review of Systems   Constitutional:  Positive for activity change and fatigue. Negative for appetite change, diaphoresis and fever.   HENT:  Negative for congestion, postnasal drip, rhinorrhea, sinus pressure, sinus pain and sore throat.    Respiratory:  Negative for cough, chest tightness, shortness of breath and wheezing.    Cardiovascular:  Negative for chest pain, palpitations and leg swelling.   Gastrointestinal:  Negative for abdominal distention, abdominal pain, blood in stool, constipation, diarrhea, nausea and vomiting.   Musculoskeletal:  Positive for back pain and gait problem.   Neurological:  Positive for facial asymmetry, speech difficulty (baseline dysarthria) and weakness. Negative for dizziness, seizures, syncope, light-headedness, numbness and headaches.   All other systems reviewed and are negative.      Last Recorded Vitals  BP (!) 130/109   Pulse 56   Temp 36.8 °C (98.2 °F) (Temporal)   Resp 18   Wt 75 kg (165 lb 5.5 oz)   SpO2 96%      Physical Exam  Vitals and nursing note reviewed.   Constitutional:       General: He is not in acute distress.     Appearance: Normal appearance. He is not ill-appearing or diaphoretic.   HENT:      Head: Normocephalic and atraumatic.      Mouth/Throat:      Mouth: Mucous membranes are moist.      Pharynx: Oropharynx is clear.   Eyes:      Extraocular Movements: Extraocular movements intact.      Conjunctiva/sclera: Conjunctivae normal.      Pupils: Pupils are equal, round, and reactive to light.   Neck:      Vascular: No carotid bruit.      Comments: No JVD  Cardiovascular:      Rate and Rhythm: Normal rate and regular rhythm.      Pulses: Normal pulses.      Heart sounds: No murmur heard.  Pulmonary:      Effort: Pulmonary effort is normal. No respiratory distress.      Breath sounds: Normal breath sounds. No wheezing, rhonchi or rales.   Chest:      Chest wall: No tenderness.   Abdominal:       General: Abdomen is flat. Bowel sounds are normal. There is no distension.      Palpations: Abdomen is soft. There is no mass.      Tenderness: There is no abdominal tenderness. There is no guarding or rebound.   Musculoskeletal:         General: Deformity present. No tenderness or signs of injury.      Cervical back: Normal range of motion and neck supple. No rigidity or tenderness.   Lymphadenopathy:      Cervical: No cervical adenopathy.   Skin:     General: Skin is warm and dry.      Capillary Refill: Capillary refill takes less than 2 seconds.      Findings: No bruising, erythema, lesion or rash.   Neurological:      Mental Status: He is alert.      Motor: Weakness present.      Coordination: Coordination abnormal.      Gait: Gait abnormal.      Comments: limited range of motion of the left upper and lower extremity with some contractures due to hemiplegia from prior stroke and superimposed, Patient does have left facial droop, left eyelid drop, and weakness of both left upper and lower extremities motor 2/5 and notable dysarthria making it hard to truly understand what he is saying. If slowly respond it is much easier to understand him.     Psychiatric:         Mood and Affect: Mood normal.         Behavior: Behavior normal.         Thought Content: Thought content normal.         Judgment: Judgment normal.         Assessment/Plan   Principal Problem:    Generalized weakness  Active Problems:    Cobalamin deficiency    Essential hypertension    Hypothyroidism    Mixed hyperlipidemia    Enlarged prostate    Protein calorie malnutrition (CMS/HCC)    Dysarthria due to old stroke    Left-sided weakness    Ambulatory dysfunction    Physical deconditioning    78 y.o. male with PMHx s/f prior stroke with residual left-sided weakness/dysarthria/truncal weakness (dependent on family assistance when using a walker, and wheelchair otherwise), hypothyroidism, BPH, protein calorie malnutrition, and prior left hip fracture  s/p CRPP (10/07/2023), presenting with generalized weakness, multiple near falls at home.    Acute on chronic deconditioning and debility, ambulatory dysfunction, impaired ADLs  -Home PT/OT following discharge to home had recommendations for return to SNF given worsening weakness in the week following discharge  -No reported new focal and/or lateralizing deficits from family   -No falls (caught by family)   -PT/OT/CM input appreciated   -Continue B12 supplementation     Prior stroke with residual left-sided weakness/dysarthria/truncal weakness  -Continue home therapies, Plavix  -Continue PT/OT support    Hypothyroidism  -Continue home levothyroxine     B12 deficiency   -Continue supplementation     Enlarged Prostate/BPH/OAB   -Continue home therapies     Protein-calorie malnutrition   -Clinical diagnosis; also supported by elevated BUN likely in relation to poor nutritional state   -Nutrition consultation appreciated     Prior left hip fracture s/p CRPP (10/07/2023)  -Outpatient follow-up with orthopedics as scheduled            Dragon dictation software was used to dictate this note and thus there may be minor errors in translation/transcription including garbled speech or misspellings. Please contact for clarification if needed.

## 2024-01-06 NOTE — PROGRESS NOTES
Saleem Alba is a 78 y.o. male admitted for Generalized weakness. Pharmacy reviewed the patient's [id number] medications and allergies for accuracy.    The list below reflects the PTA list prior to pharmacy medication history. A summary a changes to the PTA medication list has been listed below. Please review each medication in order reconciliation for additional clarification and justification.    Medications added:  Levothyroxine 125mcg every day  Trospium 20mg bid    Medications modified:    Medications to be removed:  Oxybutynin 5mg  Levothyroxine 112mcg    Medications of concern:      Prior to Admission Medications   Prescriptions Last Dose Informant Patient Reported? Taking?   atorvastatin (Lipitor) 40 mg tablet   No No   Sig: Take 1 tablet (40 mg) by mouth once daily.   clopidogrel (Plavix) 75 mg tablet   No No   Sig: TAKE 1 TABLET BY MOUTH ONCE  DAILY   cyanocobalamin (Vitamin B-12) 1,000 mcg tablet   Yes No   Sig: Take 1 tablet (1,000 mcg) by mouth once daily.   folic acid 0.8 mg capsule   Yes No   Sig: Take 1 tablet by mouth once daily.   levothyroxine (Synthroid, Levoxyl) 112 mcg tablet   No No   Sig: TAKE 1 AND 1/2 TABLETS BY MOUTH  ON SUNDAYS AND 1 TABLET BY MOUTH ALL OTHER DAYS   oxybutynin (Ditropan) 5 mg tablet   No No   Sig: TAKE 1 TABLET BY MOUTH 3 TIMES  DAILY   sennosides-docusate sodium (Nora-Colace) 8.6-50 mg tablet   No No   Sig: Take 1 tablet by mouth 2 times a day.   sertraline (Zoloft) 100 mg tablet   No No   Sig: Take 1 tablet (100 mg) by mouth once daily.      Facility-Administered Medications: None       Angie Porter CPhT

## 2024-01-06 NOTE — CARE PLAN
Problem: Skin  Goal: Participates in plan/prevention/treatment measures  Outcome: Progressing  Goal: Prevent/manage excess moisture  Outcome: Progressing  Goal: Prevent/minimize sheer/friction injuries  Outcome: Progressing  Goal: Promote/optimize nutrition  Outcome: Progressing  Goal: Promote skin healing  Outcome: Progressing     Problem: Fall/Injury  Goal: Not fall by end of shift  Outcome: Progressing  Goal: Be free from injury by end of the shift  Outcome: Progressing  Goal: Verbalize understanding of personal risk factors for fall in the hospital  Outcome: Progressing  Goal: Verbalize understanding of risk factor reduction measures to prevent injury from fall in the home  Outcome: Progressing  Goal: Use assistive devices by end of the shift  Outcome: Progressing  Goal: Pace activities to prevent fatigue by end of the shift  Outcome: Progressing     Problem: Pain  Goal: My pain/discomfort is manageable  Outcome: Progressing     Problem: Safety  Goal: Patient will be injury free during hospitalization  Outcome: Progressing  Goal: I will remain free of falls  Outcome: Progressing     Problem: Daily Care  Goal: Daily care needs are met  Outcome: Progressing     Problem: Psychosocial Needs  Goal: Demonstrates ability to cope with hospitalization/illness  Outcome: Progressing  Goal: Collaborate with me, my family, and caregiver to identify my specific goals  Outcome: Progressing     Problem: Discharge Barriers  Goal: My discharge needs are met  Outcome: Progressing       The clinical goals for the shift include Patient will remain free from falls throughout this shift.

## 2024-01-06 NOTE — PROGRESS NOTES
Occupational Therapy    Evaluation    Patient Name: Saleem Alba  MRN: [id number]  Today's Date: 1/6/2024  Time Calculation  Start Time: 1536  Stop Time: 1606  Time Calculation (min): 30 min    Assessment  IP OT Assessment  OT Assessment: Pt demonstrated decreased activity tolerance, balance, and safety awareness which impacted safe ADL/IADL completion. Pt with increased contraction in LUE, and extensor tone in all extremities. Pt is not at their baseline functional participation at this time. Pt would benefit from continued skills occupational therapy services during hospitalization in order to promote independence in daily routines for safe discharge  Prognosis: Fair  Barriers to Discharge: Decreased caregiver support (comorbidities)  Evaluation/Treatment Tolerance: Patient tolerated treatment well  Medical Staff Made Aware: Yes  End of Session Communication: Bedside nurse  End of Session Patient Position: Bed, 3 rail up, Alarm on    Plan:  Treatment Interventions: ADL retraining, Functional transfer training, UE strengthening/ROM, Endurance training, Patient/family training, Equipment evaluation/education, Fine motor coordination activities, Neuromuscular reeducation, Compensatory technique education  OT Frequency: 3 times per week  OT Discharge Recommendations: Moderate intensity level of continued care  Equipment Recommended upon Discharge:  (TBD)  OT - OK to Discharge: Yes    Subjective     Current Problem:  1. Generalized weakness            General:  General  Reason for Referral: Augusta Baker PA-C  Referred By: ADLs and safety  Past Medical History Relevant to Rehab:   Past Medical History:   Diagnosis Date    Personal history of other diseases of the digestive system     History of gastroesophageal reflux (GERD)    Personal history of other endocrine, nutritional and metabolic disease     History of hypercholesterolemia    Personal history of other mental and behavioral disorders     History of  depression    Personal history of transient ischemic attack (TIA), and cerebral infarction without residual deficits     History of cerebrovascular accident     With L weakness     Family/Caregiver Present: No  Co-Treatment: PT  Co-Treatment Reason: Co-eval and treat completed in order to optimize patient safety and function in order to promote OT goal achievement  Prior to Session Communication: Bedside nurse  Patient Position Received: Bed, 3 rail up    Pt admitted with generalized weakness and inability to care for self/inability of caregivers to care for him.   Orders received, chart reviewed, eval completed.       Precautions:  Medical Precautions:  (fall risk, hx of L demetrio, +alarms, LLE AFO - not present on eval)  Post-Surgical Precautions:  (WBAT LLE as of 11/24/23 (Had pinning LLE in Oct))      Pain:  Pain Assessment  Pain Assessment: 0-10  Pain Score: 0 - No pain    Objective     Cognition:  Overall Cognitive Status:  (Pt with safety deficits and memory impairements)  Orientation Level: Disoriented to place, Disoriented to person (year, but not month or situation)  Problem Solving: Exceptions to WFL  Safety/Judgement: Exceptions to WFL             Home Living:  Type of Home:  (Pt recently dc'd from SNF to live with his wife. Per chart: wife, one level, stairs to enter with rail. Unknown number of steps to enter. Per Pt, he sponge bathes)  Home Adaptive Equipment:  (LLE AFO, FWW, wc, L hand splint (reports he does not wear anymore))  Home Living Comments: Per admit in Oct, Pt was independent with ADLS and using FWW and AFO for functional mobility. Since Oct, Pt has been at SNF (except 1 week) and has had assist for ADLs and functional mobility. Pt reports he has not been able to walk since return home and his wife cannot get him in his wc     Prior Function:  Level of Kinney:  (as of recently A needed for ADLs and functional mobility (see above))      ADL:  Eating Assistance:  (set up)  Grooming  Assistance:  (supervision for thoroughness)  Bathing Assistance:  (max assist sponge bath seated in chair with back - A for LB and thoroughness keith care and UB)  UE Dressing Assistance:  (min A for hospital gown 2/2 LUE weakness and line management)  LE Dressing Assistance:  (total A for socks and brief)  Toileting Assistance with Device:  (total A - male purwick had been removed and bed was soacked on OT arrival. total A for keith care, bed change, and brief management)  Functional Assistance:  (total A (max x2 for squat pivot EOB><chair))    Activity Tolerance:  Endurance: Tolerates less than 10 min exercise, no significant change in vital signs    Bed Mobility/Transfers:   Bed Mobility  Bed Mobility: Yes  Bed Mobility 1  Bed Mobility 1: Supine to sitting, Sitting to supine  Level of Assistance 1: Maximum assistance, Maximum verbal cues (x 2 person)       Sitting Balance:  Static Sitting Balance  Static Sitting-Balance Support:  (CGA)  Dynamic Sitting Balance  Dynamic Sitting-Balance Support:  (max A)      Vision: Vision - Basic Assessment  Current Vision:  (grossly WFL able to track throughout room and read clock)      Sensation:  Light Touch: No apparent deficits      Coordination:  Movements are Fluid and Coordinated:  (Pt with increased extensor tone in all extremities when any functional mobility attempted)     Hand Function:  Hand Function  Gross Grasp:  (functional for RUE; impaired LUE - flexed contracted in all joints able to PROM <45 degreees)  Coordination: Impaired    Extremities: RUE   RUE : Within Functional Limits (MMT 4/5) and LUE   LUE: Within Functional Limits (except L: hand and wrist. Pt has shoulder ROM ~90 degrees, full elbow ROM. see above for contractures in hand)      EDUCATION:     Education Documentation  Precautions, taught by Bib Coughlin OT at 1/6/2024  4:30 PM.  Learner: Patient  Readiness: Acceptance  Method: Explanation  Response: Verbalizes Understanding, Needs  Reinforcement    ADL Training, taught by Bib Coughlin OT at 1/6/2024  4:30 PM.  Learner: Patient  Readiness: Acceptance  Method: Explanation  Response: Verbalizes Understanding, Needs Reinforcement    Body Mechanics, taught by Bib Coughlin OT at 1/6/2024  4:30 PM.  Learner: Patient  Readiness: Acceptance  Method: Explanation  Response: Verbalizes Understanding, Needs Reinforcement    Education Comments  No comments found.      Therapeutic Intervention     Pt edu on role of OT in the acute care setting. Pt verbalized understanding   Pt edu on the importance of completing ADLs as independently as possible while in the hospital in order to promote activity tolerance, strength, and return to PLOF. Pt verbalized understanding.   Pt edu to use call light and not attempt any functional mobility until staff present. Pt verbalized understanding.   Pt completed above Adls with assist noted including instructions and cuing to promote goal achievement and return to PLOF.       Goals:   Encounter Problems       Encounter Problems (Active)       ADLs       Patient will perform UB and LB bathing with minimal assist  level of assistance and DME/AE as needed (Progressing)       Start:  01/06/24    Expected End:  01/19/24            Patient with complete upper body dressing with supervision level of assistance donning and doffing all UE clothes with PRN adaptive equipment while edge of bed  (Progressing)       Start:  01/06/24    Expected End:  01/19/24            Patient with complete lower body dressing with moderate assist level of assistance donning and doffing all LE clothes  with PRN adaptive equipment while edge of bed  (Progressing)       Start:  01/06/24    Expected End:  01/19/24            Patient will feed self with set-up level of assistance using PRN adaptive equipment. (Progressing)       Start:  01/06/24    Expected End:  01/19/24            Patient will complete daily grooming tasks brushing teeth  and washing face/hair with stand by assist level of assistance and PRN adaptive equipment while edge of bed . (Progressing)       Start:  01/06/24    Expected End:  01/19/24            Patient will complete toileting including hygiene clothing management/hygiene with moderate assist level of assistance and DME/AE as needed  (Progressing)       Start:  01/06/24    Expected End:  01/19/24               BALANCE       Pt will maintain sitting unsupported balance during ADL task with stand by assist level of assistance in order to demonstrate decreased risk of falling and improved postural control. (Progressing)       Start:  01/06/24    Expected End:  01/19/24               Balance       STG - Maintains F dynamic sitting balance without upper extremity support       Start:  01/06/24    Expected End:  01/20/24       INTERVENTIONS:  1. Practice sitting on the edge of a bed/mat with minimal support.  2. Educate patient about maintining total hip precautions while maintaining balance.  3. Educate patient about pressure relief.  4. Educate patient about use of assistive device.            Transfers       STG - Transfer from bed to chair using LRD with mod/max A x1       Start:  01/06/24    Expected End:  01/20/24            STG - Patient will perform bed mobility including rolling side to side and supine<> sit with mod/max A x1       Start:  01/06/24    Expected End:  01/20/24 01/06/24 at 4:31 PM - FREDI LUNDBERG, OT

## 2024-01-06 NOTE — PROGRESS NOTES
Saleem Alba is a 78 y.o. male on day 0 of admission presenting with Generalized weakness.    Subjective   Saleem Alba is a 78 y.o. 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 Patient was admitted to [facility] in October for a hip fracture and following surgical repair and PT/OT evaluation, patient was discharged to SNF.  He had a prolonged course at the rehab facility as he was not making progress as quickly as they had originally planned, and only returned to home from facility about a week ago.  Once he originally returned home, he was able to ambulate around their house very slowly with a walker and assistance; however, over the last few days, he has been unable to ambulate entirely.  There have been multiple episodes where he was stumbling and too weak to continue, family noting that he would have fallen if they had not intervened and caught him.     ED Course (Summary):   Vitals on presentation: T98.2, /75, HR 52, RR 18, SpO2 95% RA  CBC: WBC 8.0, Hgb 13.3, platelets 261  CMP: Glucose 82, sodium 138, potassium 3.9, BUN 26, serum creatinine 0.85, mag 1.79  .  High-sensitivity troponin 9.  ECG with sinus rhythm, no acute ST-T changes, known right bundle branch; overall similar to prior in the system.  UA negative for infection  CXR with concern for a very small left pleural effusion but otherwise no obvious abnormalities     Her family at the bedside which included his wife, son, daughter and granddaughter they would prefer that the patient will remain at [facility] and inpatient rehab as they felt he got the best care there.  Family is not amenable to returning to [place] [facility] for skilled facility.  They state  that they may be worked with him 20 minutes a day otherwise he would remain in bed most of the time.  He also had numerous grievances with the nursing staff at the facility.  Patient's son had also experienced numerous grievances with his personal care here at the hospital as well as at other facilities.  Patient's did not express any of these concerns but does feel that he is getting weaker again.  The patient's son had also expressed concerns for recurrent strokelike symptoms given he is getting weaker again but this has been a progressive decline per his description ever since he was sent to [place] for SNF from [facility].    1/6: Patient is without acute complaint. Family's expectation is patient will go to acute rehab. I explained he will not likely be approved by his insurance. I did ask them to pick out some SNFs as well so that if acute rehab is denied we have a back-up plan in place already.         Objective     Constitutional:       General: He is not in acute distress.     Appearance: Normal appearance. He is not ill-appearing or diaphoretic.   HENT:      Head: Normocephalic and atraumatic.      Mouth/Throat:      Mouth: Mucous membranes are moist.      Pharynx: Oropharynx is clear.   Eyes:      Extraocular Movements: Extraocular movements intact.      Conjunctiva/sclera: Conjunctivae normal.      Pupils: Pupils are equal, round, and reactive to light.   Neck:      Vascular: No carotid bruit.      Comments: No JVD  Cardiovascular:      Rate and Rhythm: Normal rate and regular rhythm.      Pulses: Normal pulses.      Heart sounds: No murmur heard.  Pulmonary:      Effort: Pulmonary effort is normal. No respiratory distress.      Breath sounds: Normal breath sounds. No wheezing, rhonchi or rales.   Chest:      Chest wall: No tenderness.   Abdominal:      General: Abdomen is flat. Bowel sounds are normal. There is no distension.      Palpations: Abdomen is soft. There is no mass.      Tenderness: There is no  "abdominal tenderness. There is no guarding or rebound.   Musculoskeletal:         General: Deformity present. No tenderness or signs of injury.      Cervical back: Normal range of motion and neck supple. No rigidity or tenderness.   Lymphadenopathy:      Cervical: No cervical adenopathy.   Skin:     General: Skin is warm and dry.      Capillary Refill: Capillary refill takes less than 2 seconds.      Findings: No bruising, erythema, lesion or rash.   Neurological:      Mental Status: He is alert.      Motor: Weakness present.      Coordination: Coordination abnormal.      Gait: Gait abnormal.      Comments: limited range of motion of the left upper and lower extremity with some contractures due to hemiplegia from prior stroke and superimposed, Patient does have left facial droop, left eyelid drop, and weakness of both left upper and lower extremities motor 2/5 and notable dysarthria making it hard to truly understand what he is saying. If slowly respond it is much easier to understand him.     Psychiatric:         Mood and Affect: Mood normal.         Behavior: Behavior normal.         Thought Content: Thought content normal.         Judgment: Judgment normal.        Last Recorded Vitals  Blood pressure 136/78, pulse 60, temperature 36.7 °C (98.1 °F), resp. rate 16, height 1.727 m (5' 8\"), weight 71.8 kg (158 lb 4.6 oz), SpO2 96 %.  Intake/Output last 3 Shifts:  No intake/output data recorded.    Relevant Results                       Assessment/Plan   Acute on chronic deconditioning and debility, ambulatory dysfunction, impaired ADLs  -Home PT/OT following discharge to home had recommendations for return to SNF given worsening weakness in the week following discharge  -No reported new focal and/or lateralizing deficits from family   -No falls (caught by family)   -PT/OT/CM input appreciated   -Continue B12 supplementation      Prior stroke with residual left-sided weakness/dysarthria/truncal weakness  -Continue " home therapies, Plavix  -Continue PT/OT support     Hypothyroidism  -Continue home levothyroxine      B12 deficiency   -Continue supplementation      Enlarged Prostate/BPH/OAB   -Continue home therapies      Protein-calorie malnutrition   -Clinical diagnosis; also supported by elevated BUN likely in relation to poor nutritional state   -Nutrition consultation appreciated      Prior left hip fracture s/p CRPP (10/07/2023)  -Outpatient follow-up with orthopedics as scheduled       Aldo Jasso MD PhD

## 2024-01-07 PROCEDURE — 94760 N-INVAS EAR/PLS OXIMETRY 1: CPT

## 2024-01-07 PROCEDURE — 2500000004 HC RX 250 GENERAL PHARMACY W/ HCPCS (ALT 636 FOR OP/ED): Performed by: STUDENT IN AN ORGANIZED HEALTH CARE EDUCATION/TRAINING PROGRAM

## 2024-01-07 PROCEDURE — 99233 SBSQ HOSP IP/OBS HIGH 50: CPT | Performed by: INTERNAL MEDICINE

## 2024-01-07 PROCEDURE — G0378 HOSPITAL OBSERVATION PER HR: HCPCS

## 2024-01-07 PROCEDURE — 2500000001 HC RX 250 WO HCPCS SELF ADMINISTERED DRUGS (ALT 637 FOR MEDICARE OP): Performed by: STUDENT IN AN ORGANIZED HEALTH CARE EDUCATION/TRAINING PROGRAM

## 2024-01-07 RX ADMIN — Medication 3 MG: at 20:22

## 2024-01-07 RX ADMIN — ATORVASTATIN CALCIUM 40 MG: 40 TABLET, FILM COATED ORAL at 08:11

## 2024-01-07 RX ADMIN — SERTRALINE HYDROCHLORIDE 100 MG: 50 TABLET ORAL at 08:10

## 2024-01-07 RX ADMIN — GUAIFENESIN 600 MG: 600 TABLET ORAL at 20:22

## 2024-01-07 RX ADMIN — OXYBUTYNIN CHLORIDE 5 MG: 5 TABLET ORAL at 08:11

## 2024-01-07 RX ADMIN — POLYETHYLENE GLYCOL 3350 17 G: 17 POWDER, FOR SOLUTION ORAL at 08:10

## 2024-01-07 RX ADMIN — LEVOTHYROXINE SODIUM 125 MCG: 125 TABLET ORAL at 06:06

## 2024-01-07 RX ADMIN — CYANOCOBALAMIN TAB 1000 MCG 1000 MCG: 1000 TAB at 08:11

## 2024-01-07 RX ADMIN — OXYBUTYNIN CHLORIDE 5 MG: 5 TABLET ORAL at 14:04

## 2024-01-07 RX ADMIN — CLOPIDOGREL 75 MG: 75 TABLET ORAL at 08:11

## 2024-01-07 RX ADMIN — OXYBUTYNIN CHLORIDE 5 MG: 5 TABLET ORAL at 20:22

## 2024-01-07 RX ADMIN — PANTOPRAZOLE SODIUM 40 MG: 40 TABLET, DELAYED RELEASE ORAL at 06:06

## 2024-01-07 ASSESSMENT — COGNITIVE AND FUNCTIONAL STATUS - GENERAL
MOVING FROM LYING ON BACK TO SITTING ON SIDE OF FLAT BED WITH BEDRAILS: A LOT
MOVING TO AND FROM BED TO CHAIR: TOTAL
EATING MEALS: A LITTLE
PERSONAL GROOMING: A LITTLE
STANDING UP FROM CHAIR USING ARMS: TOTAL
TURNING FROM BACK TO SIDE WHILE IN FLAT BAD: TOTAL
WALKING IN HOSPITAL ROOM: TOTAL
MOVING TO AND FROM BED TO CHAIR: TOTAL
TURNING FROM BACK TO SIDE WHILE IN FLAT BAD: TOTAL
HELP NEEDED FOR BATHING: A LOT
DRESSING REGULAR LOWER BODY CLOTHING: A LOT
TOILETING: A LOT
CLIMB 3 TO 5 STEPS WITH RAILING: TOTAL
HELP NEEDED FOR BATHING: A LOT
DRESSING REGULAR UPPER BODY CLOTHING: A LITTLE
EATING MEALS: A LITTLE
MOBILITY SCORE: 7
DAILY ACTIVITIY SCORE: 15
MOBILITY SCORE: 7
DRESSING REGULAR LOWER BODY CLOTHING: A LOT
PERSONAL GROOMING: A LITTLE
WALKING IN HOSPITAL ROOM: TOTAL
STANDING UP FROM CHAIR USING ARMS: TOTAL
CLIMB 3 TO 5 STEPS WITH RAILING: TOTAL
MOVING FROM LYING ON BACK TO SITTING ON SIDE OF FLAT BED WITH BEDRAILS: A LOT
DAILY ACTIVITIY SCORE: 15
DRESSING REGULAR UPPER BODY CLOTHING: A LITTLE
TOILETING: A LOT

## 2024-01-07 ASSESSMENT — PAIN SCALES - GENERAL
PAINLEVEL_OUTOF10: 0 - NO PAIN
PAINLEVEL_OUTOF10: 0 - NO PAIN

## 2024-01-07 ASSESSMENT — PAIN - FUNCTIONAL ASSESSMENT
PAIN_FUNCTIONAL_ASSESSMENT: 0-10
PAIN_FUNCTIONAL_ASSESSMENT: 0-10

## 2024-01-07 NOTE — CARE PLAN
The clinical goals for the shift include Patient will remain free from falls and injury for the length of my shift.    Over the shift, the patient did make progress toward the following goals.    Problem: Skin  Goal: Decreased wound size/increased tissue granulation at next dressing change  1/6/2024 2106 by Brenda Mendiola RN  Outcome: Progressing  1/6/2024 2106 by Brenda Mendiola RN  Flowsheets (Taken 1/6/2024 2106)  Decreased wound size/increased tissue granulation at next dressing change: Promote sleep for wound healing  Goal: Participates in plan/prevention/treatment measures  1/6/2024 2106 by Brenda Mendiola RN  Outcome: Progressing  1/6/2024 2106 by Brenda Mendiola RN  Flowsheets (Taken 1/6/2024 2106)  Participates in plan/prevention/treatment measures:   Discuss with provider PT/OT consult   Elevate heels  Goal: Prevent/manage excess moisture  1/6/2024 2106 by Brenda Mendiola RN  Outcome: Progressing  1/6/2024 2106 by Brenda Mendiola RN  Flowsheets (Taken 1/6/2024 2106)  Prevent/manage excess moisture:   Cleanse incontinence/protect with barrier cream   Moisturize dry skin  Goal: Prevent/minimize sheer/friction injuries  1/6/2024 2106 by Brenda Mendiola RN  Outcome: Progressing  1/6/2024 2106 by Brenda Mendiola RN  Flowsheets (Taken 1/6/2024 2106)  Prevent/minimize sheer/friction injuries:   Complete micro-shifts as needed if patient unable. Adjust patient position to relieve pressure points, not a full turn   Use pull sheet   HOB 30 degrees or less   Turn/reposition every 2 hours/use positioning/transfer devices  Goal: Promote/optimize nutrition  1/6/2024 2106 by Brenda Mendiola RN  Outcome: Progressing  1/6/2024 2106 by Brenda Mendiola RN  Flowsheets (Taken 1/6/2024 2106)  Promote/optimize nutrition:   Assist with feeding   Monitor/record intake including meals   Consume > 50% meals/supplements   Offer water/supplements/favorite foods  Goal: Promote skin healing  1/6/2024 2106 by  Brenda Mendiola RN  Outcome: Progressing  1/6/2024 2106 by Brenda Mendiola RN  Flowsheets (Taken 1/6/2024 2106)  Promote skin healing:   Assess skin/pad under line(s)/device(s)   Turn/reposition every 2 hours/use positioning/transfer devices     Problem: Fall/Injury  Goal: Not fall by end of shift  Outcome: Progressing  Goal: Be free from injury by end of the shift  Outcome: Progressing  Goal: Verbalize understanding of personal risk factors for fall in the hospital  Outcome: Progressing  Goal: Verbalize understanding of risk factor reduction measures to prevent injury from fall in the home  Outcome: Progressing  Goal: Use assistive devices by end of the shift  Outcome: Progressing  Goal: Pace activities to prevent fatigue by end of the shift  Outcome: Progressing     Problem: Pain  Goal: My pain/discomfort is manageable  Outcome: Progressing     Problem: Safety  Goal: Patient will be injury free during hospitalization  Outcome: Progressing  Goal: I will remain free of falls  Outcome: Progressing     Problem: Daily Care  Goal: Daily care needs are met  Outcome: Progressing     Problem: Psychosocial Needs  Goal: Demonstrates ability to cope with hospitalization/illness  Outcome: Progressing  Goal: Collaborate with me, my family, and caregiver to identify my specific goals  Outcome: Progressing     Problem: Discharge Barriers  Goal: My discharge needs are met  Outcome: Progressing     Problem: Pain - Adult  Goal: Verbalizes/displays adequate comfort level or baseline comfort level  Outcome: Progressing     Problem: Safety - Adult  Goal: Free from fall injury  Outcome: Progressing     Problem: Discharge Planning  Goal: Discharge to home or other facility with appropriate resources  Outcome: Progressing     Problem: Chronic Conditions and Co-morbidities  Goal: Patient's chronic conditions and co-morbidity symptoms are monitored and maintained or improved  Outcome: Progressing

## 2024-01-07 NOTE — PROGRESS NOTES
Saleem Alba is a 78 y.o. male on day 0 of admission presenting with Generalized weakness.    Subjective   Saleem Alba is a 78 y.o. 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 Patient was admitted to [facility] in October for a hip fracture and following surgical repair and PT/OT evaluation, patient was discharged to SNF.  He had a prolonged course at the rehab facility as he was not making progress as quickly as they had originally planned, and only returned to home from facility about a week ago.  Once he originally returned home, he was able to ambulate around their house very slowly with a walker and assistance; however, over the last few days, he has been unable to ambulate entirely.  There have been multiple episodes where he was stumbling and too weak to continue, family noting that he would have fallen if they had not intervened and caught him.     ED Course (Summary):   Vitals on presentation: T98.2, /75, HR 52, RR 18, SpO2 95% RA  CBC: WBC 8.0, Hgb 13.3, platelets 261  CMP: Glucose 82, sodium 138, potassium 3.9, BUN 26, serum creatinine 0.85, mag 1.79  .  High-sensitivity troponin 9.  ECG with sinus rhythm, no acute ST-T changes, known right bundle branch; overall similar to prior in the system.  UA negative for infection  CXR with concern for a very small left pleural effusion but otherwise no obvious abnormalities     Her family at the bedside which included his wife, son, daughter and granddaughter they would prefer that the patient will remain at [facility] and inpatient rehab as they felt he got the best care there.  Family is not amenable to returning to [place] for skilled facility.  They state  that they may be worked with him 20 minutes a day otherwise he would remain in bed most of the time.  He also had numerous grievances with the nursing staff at the facility.  Patient's son had also experienced numerous grievances with his personal care here at the hospital as well as at other facilities.  Patient's did not express any of these concerns but does feel that he is getting weaker again.  The patient's son had also expressed concerns for recurrent strokelike symptoms given he is getting weaker again but this has been a progressive decline per his description ever since he was sent to [place] for SNF from [facility].    1/6: Patient is without acute complaint. Family's expectation is patient will go to acute rehab. I explained he will not likely be approved by his insurance. I did ask them to pick out some SNFs as well so that if acute rehab is denied we have a back-up plan in place already.      1/7: He is without acute complaint.        Objective     Constitutional:       General: He is not in acute distress.     Appearance: Normal appearance. He is not ill-appearing or diaphoretic.   HENT:      Head: Normocephalic and atraumatic.      Mouth/Throat:      Mouth: Mucous membranes are moist.      Pharynx: Oropharynx is clear.   Eyes:      Extraocular Movements: Extraocular movements intact.      Conjunctiva/sclera: Conjunctivae normal.      Pupils: Pupils are equal, round, and reactive to light.   Neck:      Vascular: No carotid bruit.      Comments: No JVD  Cardiovascular:      Rate and Rhythm: Normal rate and regular rhythm.      Pulses: Normal pulses.      Heart sounds: No murmur heard.  Pulmonary:      Effort: Pulmonary effort is normal. No respiratory distress.      Breath sounds: Normal breath sounds. No wheezing, rhonchi or rales.   Chest:      Chest wall: No tenderness.   Abdominal:      General: Abdomen is flat. Bowel sounds are normal. There is no distension.      Palpations: Abdomen is soft. There  "is no mass.      Tenderness: There is no abdominal tenderness. There is no guarding or rebound.   Musculoskeletal:         General: Deformity present. No tenderness or signs of injury.      Cervical back: Normal range of motion and neck supple. No rigidity or tenderness.   Lymphadenopathy:      Cervical: No cervical adenopathy.   Skin:     General: Skin is warm and dry.      Capillary Refill: Capillary refill takes less than 2 seconds.      Findings: No bruising, erythema, lesion or rash.   Neurological:      Mental Status: He is alert.      Motor: Weakness present.      Coordination: Coordination abnormal.      Gait: Gait abnormal.      Comments: limited range of motion of the left upper and lower extremity with some contractures due to hemiplegia from prior stroke and superimposed, Patient does have left facial droop, left eyelid drop, and weakness of both left upper and lower extremities motor 2/5 and notable dysarthria making it hard to truly understand what he is saying. If slowly respond it is much easier to understand him.     Psychiatric:         Mood and Affect: Mood normal.         Behavior: Behavior normal.         Thought Content: Thought content normal.         Judgment: Judgment normal.        Last Recorded Vitals  Blood pressure 118/62, pulse 54, temperature 36.3 °C (97.4 °F), temperature source Temporal, resp. rate 16, height 1.727 m (5' 8\"), weight 71.8 kg (158 lb 4.6 oz), SpO2 96 %.  Intake/Output last 3 Shifts:  I/O last 3 completed shifts:  In: 130 (1.8 mL/kg) [P.O.:120; I.V.:10 (0.1 mL/kg)]  Out: 400 (5.6 mL/kg) [Urine:400 (0.2 mL/kg/hr)]  Weight: 71.8 kg     Relevant Results                       Assessment/Plan   Acute on chronic deconditioning and debility, ambulatory dysfunction, impaired ADLs  -Home PT/OT following discharge to home had recommendations for return to SNF given worsening weakness in the week following discharge  -No reported new focal and/or lateralizing deficits from family "   -No falls (caught by family)   -PT/OT/CM input appreciated   -Continue B12 supplementation      Prior stroke with residual left-sided weakness/dysarthria/truncal weakness  -Continue home therapies, Plavix  -Continue PT/OT support     Hypothyroidism  -Continue home levothyroxine      B12 deficiency   -Continue supplementation      Enlarged Prostate/BPH/OAB   -Continue home therapies      Protein-calorie malnutrition   -Clinical diagnosis; also supported by elevated BUN likely in relation to poor nutritional state   -Nutrition consultation appreciated      Prior left hip fracture s/p CRPP (10/07/2023)  -Outpatient follow-up with orthopedics as scheduled       Aldo Jasso MD PhD

## 2024-01-07 NOTE — CARE PLAN
Problem: Skin  Goal: Decreased wound size/increased tissue granulation at next dressing change  Outcome: Progressing  Goal: Participates in plan/prevention/treatment measures  Outcome: Progressing  Goal: Prevent/manage excess moisture  Outcome: Progressing  Goal: Prevent/minimize sheer/friction injuries  Outcome: Progressing  Goal: Promote/optimize nutrition  Outcome: Progressing  Goal: Promote skin healing  Outcome: Progressing     Problem: Fall/Injury  Goal: Not fall by end of shift  Outcome: Progressing  Goal: Be free from injury by end of the shift  Outcome: Progressing  Goal: Verbalize understanding of personal risk factors for fall in the hospital  Outcome: Progressing  Goal: Verbalize understanding of risk factor reduction measures to prevent injury from fall in the home  Outcome: Progressing  Goal: Use assistive devices by end of the shift  Outcome: Progressing  Goal: Pace activities to prevent fatigue by end of the shift  Outcome: Progressing     Problem: Pain  Goal: My pain/discomfort is manageable  Outcome: Progressing     Problem: Safety  Goal: Patient will be injury free during hospitalization  Outcome: Progressing  Goal: I will remain free of falls  Outcome: Progressing     Problem: Daily Care  Goal: Daily care needs are met  Outcome: Progressing     Problem: Psychosocial Needs  Goal: Demonstrates ability to cope with hospitalization/illness  Outcome: Progressing  Goal: Collaborate with me, my family, and caregiver to identify my specific goals  Outcome: Progressing     Problem: Discharge Barriers  Goal: My discharge needs are met  Outcome: Progressing     Problem: Pain - Adult  Goal: Verbalizes/displays adequate comfort level or baseline comfort level  Outcome: Progressing     Problem: Safety - Adult  Goal: Free from fall injury  Outcome: Progressing     Problem: Discharge Planning  Goal: Discharge to home or other facility with appropriate resources  Outcome: Progressing     Problem: Chronic  Conditions and Co-morbidities  Goal: Patient's chronic conditions and co-morbidity symptoms are monitored and maintained or improved  Outcome: Progressing       The clinical goals for the shift include Patient will remain free from falls throughout this shift.

## 2024-01-08 PROCEDURE — 2500000004 HC RX 250 GENERAL PHARMACY W/ HCPCS (ALT 636 FOR OP/ED): Performed by: STUDENT IN AN ORGANIZED HEALTH CARE EDUCATION/TRAINING PROGRAM

## 2024-01-08 PROCEDURE — 99233 SBSQ HOSP IP/OBS HIGH 50: CPT | Performed by: INTERNAL MEDICINE

## 2024-01-08 PROCEDURE — 1200000002 HC GENERAL ROOM WITH TELEMETRY DAILY

## 2024-01-08 PROCEDURE — 97535 SELF CARE MNGMENT TRAINING: CPT | Mod: GO,CO

## 2024-01-08 PROCEDURE — 2500000001 HC RX 250 WO HCPCS SELF ADMINISTERED DRUGS (ALT 637 FOR MEDICARE OP): Performed by: STUDENT IN AN ORGANIZED HEALTH CARE EDUCATION/TRAINING PROGRAM

## 2024-01-08 RX ADMIN — OXYBUTYNIN CHLORIDE 5 MG: 5 TABLET ORAL at 20:49

## 2024-01-08 RX ADMIN — CYANOCOBALAMIN TAB 1000 MCG 1000 MCG: 1000 TAB at 09:10

## 2024-01-08 RX ADMIN — OXYBUTYNIN CHLORIDE 5 MG: 5 TABLET ORAL at 09:10

## 2024-01-08 RX ADMIN — POLYETHYLENE GLYCOL 3350 17 G: 17 POWDER, FOR SOLUTION ORAL at 09:12

## 2024-01-08 RX ADMIN — PANTOPRAZOLE SODIUM 40 MG: 40 TABLET, DELAYED RELEASE ORAL at 06:40

## 2024-01-08 RX ADMIN — OXYBUTYNIN CHLORIDE 5 MG: 5 TABLET ORAL at 16:57

## 2024-01-08 RX ADMIN — LEVOTHYROXINE SODIUM 125 MCG: 125 TABLET ORAL at 06:40

## 2024-01-08 RX ADMIN — Medication 3 MG: at 20:49

## 2024-01-08 RX ADMIN — SERTRALINE HYDROCHLORIDE 100 MG: 50 TABLET ORAL at 09:10

## 2024-01-08 RX ADMIN — ATORVASTATIN CALCIUM 40 MG: 40 TABLET, FILM COATED ORAL at 09:10

## 2024-01-08 RX ADMIN — CLOPIDOGREL 75 MG: 75 TABLET ORAL at 09:10

## 2024-01-08 ASSESSMENT — COGNITIVE AND FUNCTIONAL STATUS - GENERAL
CLIMB 3 TO 5 STEPS WITH RAILING: TOTAL
STANDING UP FROM CHAIR USING ARMS: TOTAL
EATING MEALS: A LITTLE
MOVING TO AND FROM BED TO CHAIR: TOTAL
TOILETING: TOTAL
DAILY ACTIVITIY SCORE: 13
DRESSING REGULAR LOWER BODY CLOTHING: A LOT
EATING MEALS: A LITTLE
HELP NEEDED FOR BATHING: A LOT
PERSONAL GROOMING: A LITTLE
TURNING FROM BACK TO SIDE WHILE IN FLAT BAD: TOTAL
TOILETING: TOTAL
DRESSING REGULAR UPPER BODY CLOTHING: A LOT
DAILY ACTIVITIY SCORE: 13
MOVING FROM LYING ON BACK TO SITTING ON SIDE OF FLAT BED WITH BEDRAILS: A LOT
MOBILITY SCORE: 7
DRESSING REGULAR UPPER BODY CLOTHING: A LOT
PERSONAL GROOMING: A LITTLE
HELP NEEDED FOR BATHING: A LOT
WALKING IN HOSPITAL ROOM: TOTAL
DRESSING REGULAR LOWER BODY CLOTHING: A LOT

## 2024-01-08 ASSESSMENT — PAIN - FUNCTIONAL ASSESSMENT
PAIN_FUNCTIONAL_ASSESSMENT: 0-10
PAIN_FUNCTIONAL_ASSESSMENT: 0-10

## 2024-01-08 ASSESSMENT — ACTIVITIES OF DAILY LIVING (ADL)
HOME_MANAGEMENT_TIME_ENTRY: 25
LACK_OF_TRANSPORTATION: NO

## 2024-01-08 ASSESSMENT — PAIN SCALES - GENERAL
PAINLEVEL_OUTOF10: 0 - NO PAIN

## 2024-01-08 NOTE — CARE PLAN
The patient's goals for the shift include      The clinical goals for the shift include Patient will remain free from falls and injury for the length of my shift.      Problem: Skin  Goal: Decreased wound size/increased tissue granulation at next dressing change  Outcome: Progressing  Flowsheets (Taken 1/8/2024 0808)  Decreased wound size/increased tissue granulation at next dressing change: Promote sleep for wound healing  Goal: Participates in plan/prevention/treatment measures  Outcome: Progressing  Flowsheets (Taken 1/8/2024 0808)  Participates in plan/prevention/treatment measures: Elevate heels  Goal: Prevent/manage excess moisture  Outcome: Progressing  Flowsheets (Taken 1/8/2024 0808)  Prevent/manage excess moisture: Moisturize dry skin  Goal: Prevent/minimize sheer/friction injuries  Outcome: Progressing  Flowsheets (Taken 1/8/2024 0808)  Prevent/minimize sheer/friction injuries:   Use pull sheet   HOB 30 degrees or less   Turn/reposition every 2 hours/use positioning/transfer devices  Goal: Promote/optimize nutrition  Outcome: Progressing  Flowsheets (Taken 1/8/2024 0808)  Promote/optimize nutrition: Monitor/record intake including meals  Goal: Promote skin healing  Outcome: Progressing  Flowsheets (Taken 1/8/2024 0808)  Promote skin healing: Turn/reposition every 2 hours/use positioning/transfer devices     Problem: Fall/Injury  Goal: Not fall by end of shift  Outcome: Progressing  Goal: Be free from injury by end of the shift  Outcome: Progressing  Goal: Verbalize understanding of personal risk factors for fall in the hospital  Outcome: Progressing  Goal: Verbalize understanding of risk factor reduction measures to prevent injury from fall in the home  Outcome: Progressing  Goal: Use assistive devices by end of the shift  Outcome: Progressing  Goal: Pace activities to prevent fatigue by end of the shift  Outcome: Progressing     Problem: Pain  Goal: My pain/discomfort is manageable  Outcome:  Progressing     Problem: Safety  Goal: Patient will be injury free during hospitalization  Outcome: Progressing  Goal: I will remain free of falls  Outcome: Progressing     Problem: Daily Care  Goal: Daily care needs are met  Outcome: Progressing     Problem: Psychosocial Needs  Goal: Demonstrates ability to cope with hospitalization/illness  Outcome: Progressing  Goal: Collaborate with me, my family, and caregiver to identify my specific goals  Outcome: Progressing     Problem: Discharge Barriers  Goal: My discharge needs are met  Outcome: Progressing     Problem: Pain - Adult  Goal: Verbalizes/displays adequate comfort level or baseline comfort level  Outcome: Progressing     Problem: Safety - Adult  Goal: Free from fall injury  Outcome: Progressing     Problem: Discharge Planning  Goal: Discharge to home or other facility with appropriate resources  Outcome: Progressing     Problem: Chronic Conditions and Co-morbidities  Goal: Patient's chronic conditions and co-morbidity symptoms are monitored and maintained or improved  Outcome: Progressing

## 2024-01-08 NOTE — PROGRESS NOTES
Occupational Therapy    Occupational Therapy Treatment    Name: Saleem Alba  MRN: [id number]  : 1945  Date: 24  Time Calculation  Start Time: 1423  Stop Time: 1448  Time Calculation (min): 25 min    Assessment:  OT Assessment: Pt tolerated session fairly at this date. Pt would benefit from continued OT services to improve fxl performance and t/fs to increase safety and IND in ADL tasks  End of Session Communication: Bedside nurse  End of Session Patient Position: Bed, 3 rail up, Alarm on  Plan:  Treatment Interventions: ADL retraining, Functional transfer training, UE strengthening/ROM, Endurance training, Patient/family training, Equipment evaluation/education, Fine motor coordination activities, Neuromuscular reeducation, Compensatory technique education  OT Frequency: 3 times per week  OT Discharge Recommendations: Moderate intensity level of continued care  Equipment Recommended upon Discharge:  (TBD)  OT - OK to Discharge: Yes    Subjective   Previous Visit Info:  OT Last Visit  OT Received On: 24  General:  General  Prior to Session Communication: Bedside nurse  Patient Position Received: Bed, 3 rail up, Alarm on  Preferred Learning Style: verbal  General Comment: pt laying in bed with family member present in room, agreeable to OT tx session at this time.  Precautions:     Vitals:     Pain Assessment:  Pain Assessment  Pain Assessment: 0-10  Pain Score: 0 - No pain     Objective   Activities of Daily Living: Grooming  Grooming Level of Assistance: Setup, Contact guard  Grooming Where Assessed: Bed level  Grooming Comments: pt completed grooming and face wash task at bed level with set up and cga at this time         Toileting  Toileting Level of Assistance: Dependent  Where Assessed: Bed level  Toileting Comments: pt required totalA for pericare and brief change at bed level with maxAx2 for rolling side to side to change brief and pad underneath    Functional Standing Tolerance:     Bed  Mobility/Transfers: Bed Mobility  Bed Mobility: Yes  Bed Mobility 1  Bed Mobility 1: Supine to sitting, Sitting to supine, Rolling right, Rolling left, Scooting  Level of Assistance 1: Dependent (x2)  Bed Mobility Comments 1: pt required depAx2 for bed mobility and maxAx1 for sup to sit t/f and increased time and cues required for safety and proper t/f technique    Transfers  Transfer: Yes  Transfer 1  Transfer From 1: Bed to  Transfer to 1: Stand  Technique 1: Sit to stand  Transfer Device 1:  (bilateral arm in arm assist)  Transfer Level of Assistance 1: +2, Maximum assistance  Trials/Comments 1: pt completed STS t/f maxA+2 with bilateral arm in arm assist and max cues for posiitoning and initiation. Pt demos poor follow through and t/f technique. Pt unable to achieved full upright position at this time.    Sitting Balance:  Dynamic Sitting Balance  Dynamic Sitting-Balance Support: Right upper extremity supported, Left upper extremity supported  Dynamic Sitting-Comments: therapist implemented sitting EOB to improve dynamic sitting balance. Pt sitting balance challenged at EOB with modA for sitting balance support. Pt demos frequent retropulsion and severe tone on L side, challenging sitting balance at this date         Outcome Measures:  [facility] Daily Activity  Putting on and taking off regular lower body clothing: A lot  Bathing (including washing, rinsing, drying): A lot  Putting on and taking off regular upper body clothing: A lot  Toileting, which includes using toilet, bedpan or urinal: Total  Taking care of personal grooming such as brushing teeth: A little  Eating Meals: A little  Daily Activity - Total Score: 13        Education Documentation  Precautions, taught by RIMMA Choi at 1/8/2024  3:13 PM.  Learner: Patient  Readiness: Acceptance  Method: Explanation  Response: Verbalizes Understanding    ADL Training, taught by RIMMA Cohi at 1/8/2024  3:13 PM.  Learner: Patient  Readiness:  Acceptance  Method: Explanation  Response: Verbalizes Understanding    Body Mechanics, taught by RIMMA Choi at 1/8/2024  3:13 PM.  Learner: Patient  Readiness: Acceptance  Method: Explanation  Response: Verbalizes Understanding    Education Comments  No comments found.      Goals:  Encounter Problems       Encounter Problems (Active)       ADLs       Patient will perform UB and LB bathing with minimal assist  level of assistance and DME/AE as needed (Not Progressing)       Start:  01/06/24    Expected End:  01/08/24            Patient with complete upper body dressing with supervision level of assistance donning and doffing all UE clothes with PRN adaptive equipment while edge of bed  (Progressing)       Start:  01/06/24    Expected End:  01/08/24            Patient with complete lower body dressing with moderate assist level of assistance donning and doffing all LE clothes  with PRN adaptive equipment while edge of bed  (Not Progressing)       Start:  01/06/24    Expected End:  01/08/24            Patient will feed self with set-up level of assistance using PRN adaptive equipment. (Not Progressing)       Start:  01/06/24    Expected End:  01/08/24            Patient will complete daily grooming tasks brushing teeth and washing face/hair with stand by assist level of assistance and PRN adaptive equipment while edge of bed . (Progressing)       Start:  01/06/24    Expected End:  01/08/24            Patient will complete toileting including hygiene clothing management/hygiene with moderate assist level of assistance and DME/AE as needed  (Progressing)       Start:  01/06/24    Expected End:  01/08/24               BALANCE       Pt will maintain sitting unsupported balance during ADL task with stand by assist level of assistance in order to demonstrate decreased risk of falling and improved postural control. (Progressing)       Start:  01/06/24    Expected End:  01/08/24

## 2024-01-08 NOTE — PROGRESS NOTES
Saleem Alba is a 78 y.o. male on day 0 of admission presenting with Generalized weakness.    Subjective   Saleem Alba is a 78 y.o. 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 Patient was admitted to [facility] in October for a hip fracture and following surgical repair and PT/OT evaluation, patient was discharged to SNF.  He had a prolonged course at the rehab facility as he was not making progress as quickly as they had originally planned, and only returned to home from facility about a week ago.  Once he originally returned home, he was able to ambulate around their house very slowly with a walker and assistance; however, over the last few days, he has been unable to ambulate entirely.  There have been multiple episodes where he was stumbling and too weak to continue, family noting that he would have fallen if they had not intervened and caught him.     ED Course (Summary):   Vitals on presentation: T98.2, /75, HR 52, RR 18, SpO2 95% RA  CBC: WBC 8.0, Hgb 13.3, platelets 261  CMP: Glucose 82, sodium 138, potassium 3.9, BUN 26, serum creatinine 0.85, mag 1.79  .  High-sensitivity troponin 9.  ECG with sinus rhythm, no acute ST-T changes, known right bundle branch; overall similar to prior in the system.  UA negative for infection  CXR with concern for a very small left pleural effusion but otherwise no obvious abnormalities     Her family at the bedside which included his wife, son, daughter and granddaughter they would prefer that the patient will remain at [facility] and inpatient rehab as they felt he got the best care there.  Family is not amenable to returning to [facility] for skilled facility.  They state  that they may be worked with him 20 minutes a day otherwise he would remain in bed most of the time.  He also had numerous grievances with the nursing staff at the facility.  Patient's son had also experienced numerous grievances with his personal care here at the hospital as well as at other facilities.  Patient's did not express any of these concerns but does feel that he is getting weaker again.  The patient's son had also expressed concerns for recurrent strokelike symptoms given he is getting weaker again but this has been a progressive decline per his description ever since he was sent to [place] for SNF from [facility].    1/6: Patient is without acute complaint. Family's expectation is patient will go to acute rehab. I explained he will not likely be approved by his insurance. I did ask them to pick out some SNFs as well so that if acute rehab is denied we have a back-up plan in place already.      1/7: He is without acute complaint.     1/8: He remains without complaint.            Objective     Constitutional:       General: He is not in acute distress.     Appearance: Normal appearance. He is not ill-appearing or diaphoretic.   HENT:      Head: Normocephalic and atraumatic.      Mouth/Throat:      Mouth: Mucous membranes are moist.      Pharynx: Oropharynx is clear.   Eyes:      Extraocular Movements: Extraocular movements intact.      Conjunctiva/sclera: Conjunctivae normal.      Pupils: Pupils are equal, round, and reactive to light.   Neck:      Vascular: No carotid bruit.      Comments: No JVD  Cardiovascular:      Rate and Rhythm: Normal rate and regular rhythm.      Pulses: Normal pulses.      Heart sounds: No murmur heard.  Pulmonary:      Effort: Pulmonary effort is normal. No respiratory distress.      Breath sounds: Normal breath sounds. No wheezing, rhonchi or rales.   Chest:      Chest wall: No tenderness.   Abdominal:      General: Abdomen is flat. Bowel sounds are normal. There is no  "distension.      Palpations: Abdomen is soft. There is no mass.      Tenderness: There is no abdominal tenderness. There is no guarding or rebound.   Musculoskeletal:         General: Deformity present. No tenderness or signs of injury.      Cervical back: Normal range of motion and neck supple. No rigidity or tenderness.   Lymphadenopathy:      Cervical: No cervical adenopathy.   Skin:     General: Skin is warm and dry.      Capillary Refill: Capillary refill takes less than 2 seconds.      Findings: No bruising, erythema, lesion or rash.   Neurological:      Mental Status: He is alert.      Motor: Weakness present.      Coordination: Coordination abnormal.      Gait: Gait abnormal.      Comments: limited range of motion of the left upper and lower extremity with some contractures due to hemiplegia from prior stroke and superimposed, Patient does have left facial droop, left eyelid drop, and weakness of both left upper and lower extremities motor 2/5 and notable dysarthria making it hard to truly understand what he is saying. If slowly respond it is much easier to understand him.     Psychiatric:         Mood and Affect: Mood normal.         Behavior: Behavior normal.         Thought Content: Thought content normal.         Judgment: Judgment normal.        Last Recorded Vitals  Blood pressure 108/61, pulse 50, temperature 36.4 °C (97.6 °F), temperature source Temporal, resp. rate 18, height 1.727 m (5' 8\"), weight 71.8 kg (158 lb 4.6 oz), SpO2 94 %.  Intake/Output last 3 Shifts:  I/O last 3 completed shifts:  In: 1500 (20.9 mL/kg) [P.O.:1490; I.V.:10 (0.1 mL/kg)]  Out: 1000 (13.9 mL/kg) [Urine:1000 (0.4 mL/kg/hr)]  Weight: 71.8 kg     Relevant Results                       Assessment/Plan   Acute on chronic deconditioning and debility, ambulatory dysfunction, impaired ADLs  -Home PT/OT following discharge to home had recommendations for return to SNF given worsening weakness in the week following discharge  -No " reported new focal and/or lateralizing deficits from family   -No falls (caught by family)   -PT/OT/CM input appreciated   -Continue B12 supplementation      Prior stroke with residual left-sided weakness/dysarthria/truncal weakness  -Continue home therapies, Plavix  -Continue PT/OT support     Hypothyroidism  -Continue home levothyroxine      B12 deficiency   -Continue supplementation      Enlarged Prostate/BPH/OAB   -Continue home therapies      Protein-calorie malnutrition   -Clinical diagnosis; also supported by elevated BUN likely in relation to poor nutritional state   -Nutrition consultation appreciated      Prior left hip fracture s/p CRPP (10/07/2023)  -Outpatient follow-up with orthopedics as scheduled       Aldo Jasso MD PhD

## 2024-01-08 NOTE — CONSULTS
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  Patient reports good/normal appetite, tolerating meals well.  No reported known weight changes, weight appears stable within the past year per chart review.  Will monitor and follow per protocol.      Current Diet: Adult diet Regular  Average meal Intake during admission: %       Nutrition Related Findings:   Oral Symptoms: none Teeth: Missing teeth   GI symptoms: no GI issues at this time.   BM: Last BM Date: 01/07/24  Wound Type: none (nursing/wound notes provide further details)    Food allergies: NKFA. is allergic to hexadrol.  Meds/Labs reviewed.  atorvastatin, 40 mg, oral, Daily  clopidogrel, 75 mg, oral, Daily  cyanocobalamin, 1,000 mcg, oral, Daily  levothyroxine, 125 mcg, oral, Daily before breakfast  melatonin, 3 mg, oral, Daily  oxybutynin, 5 mg, oral, TID  pantoprazole, 40 mg, oral, Daily before breakfast   Or  pantoprazole, 40 mg, intravenous, Daily before breakfast  polyethylene glycol, 17 g, oral, Daily  sertraline, 100 mg, oral, Daily             Nutrition Significant Labs:    Results from last 7 days   Lab Units 01/06/24  0450 01/05/24  1751   GLUCOSE mg/dL 82 82   SODIUM mmol/L 139 138   POTASSIUM mmol/L 3.7 3.9   CHLORIDE mmol/L 108* 104   CO2 mmol/L 26 29   BUN mg/dL 27* 26*   CREATININE mg/dL 0.84 0.85   EGFR mL/min/1.73m*2 88 88   CALCIUM mg/dL 8.5* 9.2   PHOSPHORUS mg/dL  --  2.7   MAGNESIUM mg/dL 1.74 1.79     No results found for: \"HGBA1C\"    " "    Anthropometrics:  Height: 172.7 cm (5' 8\")   Weight: 71.8 kg (158 lb 4.6 oz)   BMI (Calculated): 24.07  IBW/kg (Dietitian Calculated): 70 kg          Weight History:   Wt Readings from Last 10 Encounters:   01/05/24 71.8 kg (158 lb 4.6 oz)   11/24/23 64.9 kg (143 lb)   10/06/23 72.6 kg (160 lb)   06/15/23 70.3 kg (155 lb)   06/15/23 70.8 kg (156 lb)   01/17/23 70.3 kg (155 lb)   12/15/22 70.3 kg (155 lb)   12/14/22 70.3 kg (155 lb)   10/27/22 70.3 kg (155 lb)   09/29/22 70.3 kg (155 lb)        Weight Change %:  Weight History / % Weight Change: Stable weight per chart review.  (1/17/23 70.3 kg, 10/6/23 72.6 kg, 1/5/24 71.8 kg)  Significant Weight Loss: No          Nutrition Focused Physical Exam Findings:  defer: mild losses per visual, likely age related      Estimated Needs:   Total Energy Estimated Needs (kCal):  (0128-3683)  Method for Estimating Needs: 28-30, CBW  Total Protein Estimated Needs (g):  (70-85)  Method for Estimating Needs: 1-1.2, CBW  Total Fluid Estimated Needs (mL):  (1 mL, kcal or per physician)           Nutrition Diagnosis   Nutrition Diagnosis:  Malnutrition Diagnosis  Patient has Malnutrition Diagnosis: No (stable weight, good intake)    Nutrition Diagnosis  Patient has Nutrition Diagnosis: No       Nutrition Interventions/Recommendations   Nutrition Interventions and Recommendations:        Nutrition Prescription:  Individualized Nutrition Prescription Provided for : Continue regular diet        Nutrition Interventions:   Food and/or Nutrient Delivery Interventions  Interventions: Meals and snacks  Meals and Snacks: General healthful diet  Goal: >75% intake of regular diet         Nutrition Education:   Education Documentation  No documentation found.      Nutrition Counseling  Counseling Theoretical Approach: Other (Comment)  Goal: N/A - regular diet       Nutrition Monitoring and Evaluation   Monitoring/Evaluation:   Food/Nutrient Related History Monitoring  Monitoring and Evaluation " Plan: Energy intake  Energy Intake: Estimated energy intake  Criteria: meet >75% of estimated needs from diet    Body Composition/Growth/Weight History  Monitoring and Evaluation Plan: Weight  Weight: Weight change  Criteria: Maintain stable    Biochemical Data, Medical Tests and Procedures  Monitoring and Evaluation Plan: Electrolyte/renal panel  Electrolyte and Renal Panel: BUN, Creatinine, Sodium, Potassium, Phosphorus  Criteria: WNL    Nutrition Focused Physical Findings  Monitoring and Evaluation Plan: Skin  Criteria: Maintain skin integrity            Time Spent/Follow-up Reminder:   Follow Up  Time Spent (min): 45 minutes  Last Date of Nutrition Visit: 01/08/24  Nutrition Follow-Up Needed?: Dietitian to reassess per policy  Follow up Comment: 1/15

## 2024-01-08 NOTE — PROGRESS NOTES
Physical Therapy                 Therapy Communication Note    Patient Name: Saleem Alba  MRN: [id number]  Today's Date: 1/8/2024     Discipline: Physical Therapy    Missed Visit Reason: Patient refused    Missed Time: Attempt    Comment: pt sleeping soundly, arouses to name but declined due to fatigue

## 2024-01-08 NOTE — PROGRESS NOTES
01/08/24 1308   Discharge Planning   Living Arrangements Spouse/significant other   Support Systems Spouse/significant other   Assistance Needed Wheelchair Bound   Type of Residence Private residence   Home or Post Acute Services In home services   Type of Home Care Services Home nursing visits;Home OT;Home PT   Patient expects to be discharged to: [facility]   Does the patient need discharge transport arranged? Yes   RoundTrip coordination needed? Yes   Financial Resource Strain   How hard is it for you to pay for the very basics like food, housing, medical care, and heating? Not hard   Housing Stability   In the last 12 months, was there a time when you were not able to pay the mortgage or rent on time? N   In the last 12 months, was there a time when you did not have a steady place to sleep or slept in a shelter (including now)? N   Transportation Needs   In the past 12 months, has lack of transportation kept you from medical appointments or from getting medications? no   In the past 12 months, has lack of transportation kept you from meetings, work, or from getting things needed for daily living? No     PCP is Cassie Hernandez. Patient is from home with wife. Patient was recently at [facility] for rehab and was discharged once progress had plateaued with therapy. Patient was discharged home from [facility] with [facility]. When patient arrived home his wife states he was I assist with walker but has since declined to being wheelchair bound. Family is wanting patient to go to [facility]. Explained that he is not appropriate for [facility]. Patient is currently observation status and not able to be placed at SNF without private pay, or return home with [facility]. TCC to follow.     1/8 1400 Patient was upgraded to inpatient and will require 3 midnights for SNF placement. Patient has been recommended for Skilled Nursing Facility. Provided skilled nursing list to patient and wife from [facility] directory that includes facilities that are within   [facility], as well as meeting patient's medical needs, and are in-network for patient's insurance; while also in discharge geographic area patient prefers, and identifies each facilities CMS star rating. To review.     1/9 1125 Spoke to patients wife and son regarding SNF choices. They requested referrals be sent to [facility], [facility], and [facility] at [facility]. Unsure of [facility] at this time. Will request [facility] send referrals.      1/9 1523 Notified patients wife and son that all three choices are able to accept the patient. Continues to be unsure regarding [facility] but plans on touring them to determine.     1/10 1055 Spoke to patients son and wife. FOC is [facility]. Patients 3 midnights will be 1/11.     1/10 Discussed discharge planning during interdisciplinary rounds with Dr. Jasso. Patient is awaiting 3rd midnight and then is able to discharge to [facility].     1/11 1020 Notified RN and patients son of transport time of 11:30 to [facility]. Answered all questions and verbalized understanding.  Report number is  [phone]

## 2024-01-08 NOTE — CARE PLAN
The clinical goals for the shift include Patient will remain free from falls and injury for the length of my shift.    Over the shift, the patient did make progress toward the following goals.     Problem: Skin  Goal: Decreased wound size/increased tissue granulation at next dressing change  Outcome: Progressing  Flowsheets (Taken 1/7/2024 2107)  Decreased wound size/increased tissue granulation at next dressing change: Promote sleep for wound healing  Goal: Participates in plan/prevention/treatment measures  Outcome: Progressing  Flowsheets (Taken 1/7/2024 2107)  Participates in plan/prevention/treatment measures:   Discuss with provider PT/OT consult   Elevate heels  Goal: Prevent/manage excess moisture  Outcome: Progressing  Flowsheets (Taken 1/7/2024 2107)  Prevent/manage excess moisture:   Cleanse incontinence/protect with barrier cream   Moisturize dry skin  Goal: Prevent/minimize sheer/friction injuries  Outcome: Progressing  Flowsheets (Taken 1/7/2024 2107)  Prevent/minimize sheer/friction injuries:   Complete micro-shifts as needed if patient unable. Adjust patient position to relieve pressure points, not a full turn   Use pull sheet   HOB 30 degrees or less  Goal: Promote/optimize nutrition  Outcome: Progressing  Flowsheets (Taken 1/7/2024 2107)  Promote/optimize nutrition:   Assist with feeding   Monitor/record intake including meals   Consume > 50% meals/supplements   Offer water/supplements/favorite foods  Goal: Promote skin healing  Outcome: Progressing  Flowsheets (Taken 1/7/2024 2107)  Promote skin healing: Assess skin/pad under line(s)/device(s)     Problem: Fall/Injury  Goal: Not fall by end of shift  Outcome: Progressing  Goal: Be free from injury by end of the shift  Outcome: Progressing  Goal: Verbalize understanding of personal risk factors for fall in the hospital  Outcome: Progressing  Goal: Verbalize understanding of risk factor reduction measures to prevent injury from fall in the  home  Outcome: Progressing  Goal: Use assistive devices by end of the shift  Outcome: Progressing  Goal: Pace activities to prevent fatigue by end of the shift  Outcome: Progressing     Problem: Pain  Goal: My pain/discomfort is manageable  Outcome: Progressing     Problem: Safety  Goal: Patient will be injury free during hospitalization  Outcome: Progressing  Goal: I will remain free of falls  Outcome: Progressing     Problem: Daily Care  Goal: Daily care needs are met  Outcome: Progressing     Problem: Psychosocial Needs  Goal: Demonstrates ability to cope with hospitalization/illness  Outcome: Progressing  Goal: Collaborate with me, my family, and caregiver to identify my specific goals  Outcome: Progressing     Problem: Discharge Barriers  Goal: My discharge needs are met  Outcome: Progressing     Problem: Pain - Adult  Goal: Verbalizes/displays adequate comfort level or baseline comfort level  Outcome: Progressing     Problem: Safety - Adult  Goal: Free from fall injury  Outcome: Progressing     Problem: Discharge Planning  Goal: Discharge to home or other facility with appropriate resources  Outcome: Progressing     Problem: Chronic Conditions and Co-morbidities  Goal: Patient's chronic conditions and co-morbidity symptoms are monitored and maintained or improved  Outcome: Progressing

## 2024-01-09 PROCEDURE — 99233 SBSQ HOSP IP/OBS HIGH 50: CPT | Performed by: INTERNAL MEDICINE

## 2024-01-09 PROCEDURE — 1200000002 HC GENERAL ROOM WITH TELEMETRY DAILY

## 2024-01-09 PROCEDURE — 2500000004 HC RX 250 GENERAL PHARMACY W/ HCPCS (ALT 636 FOR OP/ED): Performed by: STUDENT IN AN ORGANIZED HEALTH CARE EDUCATION/TRAINING PROGRAM

## 2024-01-09 PROCEDURE — 2500000001 HC RX 250 WO HCPCS SELF ADMINISTERED DRUGS (ALT 637 FOR MEDICARE OP): Performed by: STUDENT IN AN ORGANIZED HEALTH CARE EDUCATION/TRAINING PROGRAM

## 2024-01-09 PROCEDURE — 97530 THERAPEUTIC ACTIVITIES: CPT | Mod: CQ,GP

## 2024-01-09 PROCEDURE — 97530 THERAPEUTIC ACTIVITIES: CPT | Mod: GO,CO

## 2024-01-09 RX ADMIN — CLOPIDOGREL 75 MG: 75 TABLET ORAL at 08:37

## 2024-01-09 RX ADMIN — OXYBUTYNIN CHLORIDE 5 MG: 5 TABLET ORAL at 20:08

## 2024-01-09 RX ADMIN — ATORVASTATIN CALCIUM 40 MG: 40 TABLET, FILM COATED ORAL at 08:37

## 2024-01-09 RX ADMIN — CYANOCOBALAMIN TAB 1000 MCG 1000 MCG: 1000 TAB at 08:37

## 2024-01-09 RX ADMIN — OXYBUTYNIN CHLORIDE 5 MG: 5 TABLET ORAL at 15:34

## 2024-01-09 RX ADMIN — SERTRALINE HYDROCHLORIDE 100 MG: 50 TABLET ORAL at 08:37

## 2024-01-09 RX ADMIN — Medication 3 MG: at 20:08

## 2024-01-09 RX ADMIN — PANTOPRAZOLE SODIUM 40 MG: 40 TABLET, DELAYED RELEASE ORAL at 05:44

## 2024-01-09 RX ADMIN — OXYBUTYNIN CHLORIDE 5 MG: 5 TABLET ORAL at 08:37

## 2024-01-09 RX ADMIN — LEVOTHYROXINE SODIUM 125 MCG: 125 TABLET ORAL at 05:44

## 2024-01-09 ASSESSMENT — COGNITIVE AND FUNCTIONAL STATUS - GENERAL
TURNING FROM BACK TO SIDE WHILE IN FLAT BAD: A LOT
HELP NEEDED FOR BATHING: A LOT
MOBILITY SCORE: 10
PERSONAL GROOMING: A LOT
DAILY ACTIVITIY SCORE: 10
MOVING FROM LYING ON BACK TO SITTING ON SIDE OF FLAT BED WITH BEDRAILS: A LOT
MOVING FROM LYING ON BACK TO SITTING ON SIDE OF FLAT BED WITH BEDRAILS: A LOT
HELP NEEDED FOR BATHING: A LOT
DRESSING REGULAR LOWER BODY CLOTHING: TOTAL
DRESSING REGULAR UPPER BODY CLOTHING: A LOT
DRESSING REGULAR LOWER BODY CLOTHING: TOTAL
MOBILITY SCORE: 10
MOVING TO AND FROM BED TO CHAIR: A LOT
TURNING FROM BACK TO SIDE WHILE IN FLAT BAD: A LOT
CLIMB 3 TO 5 STEPS WITH RAILING: TOTAL
CLIMB 3 TO 5 STEPS WITH RAILING: TOTAL
WALKING IN HOSPITAL ROOM: TOTAL
TOILETING: TOTAL
PERSONAL GROOMING: A LOT
EATING MEALS: A LOT
STANDING UP FROM CHAIR USING ARMS: A LOT
STANDING UP FROM CHAIR USING ARMS: A LOT
DAILY ACTIVITIY SCORE: 10
TOILETING: TOTAL
EATING MEALS: A LOT
DRESSING REGULAR UPPER BODY CLOTHING: A LOT
WALKING IN HOSPITAL ROOM: TOTAL
MOVING TO AND FROM BED TO CHAIR: A LOT

## 2024-01-09 ASSESSMENT — PAIN - FUNCTIONAL ASSESSMENT
PAIN_FUNCTIONAL_ASSESSMENT: 0-10

## 2024-01-09 ASSESSMENT — PAIN SCALES - GENERAL
PAINLEVEL_OUTOF10: 0 - NO PAIN

## 2024-01-09 NOTE — CARE PLAN
The patient's goals for the shift include      The clinical goals for the shift include Pt will remain injury or fall free throughout the shift.      Problem: Skin  Goal: Decreased wound size/increased tissue granulation at next dressing change  Outcome: Progressing  Flowsheets (Taken 1/9/2024 1509)  Decreased wound size/increased tissue granulation at next dressing change: Promote sleep for wound healing  Goal: Participates in plan/prevention/treatment measures  Outcome: Progressing  Flowsheets (Taken 1/9/2024 1509)  Participates in plan/prevention/treatment measures: Elevate heels  Goal: Prevent/manage excess moisture  Outcome: Progressing  Flowsheets (Taken 1/9/2024 1509)  Prevent/manage excess moisture: Moisturize dry skin  Goal: Prevent/minimize sheer/friction injuries  Outcome: Progressing  Flowsheets (Taken 1/9/2024 1509)  Prevent/minimize sheer/friction injuries:   Use pull sheet   HOB 30 degrees or less   Turn/reposition every 2 hours/use positioning/transfer devices  Goal: Promote/optimize nutrition  Outcome: Progressing  Flowsheets (Taken 1/9/2024 1509)  Promote/optimize nutrition: Monitor/record intake including meals  Goal: Promote skin healing  Outcome: Progressing  Flowsheets (Taken 1/9/2024 1509)  Promote skin healing: Turn/reposition every 2 hours/use positioning/transfer devices     Problem: Fall/Injury  Goal: Not fall by end of shift  Outcome: Progressing  Goal: Be free from injury by end of the shift  Outcome: Progressing  Goal: Verbalize understanding of personal risk factors for fall in the hospital  Outcome: Progressing  Goal: Verbalize understanding of risk factor reduction measures to prevent injury from fall in the home  Outcome: Progressing  Goal: Use assistive devices by end of the shift  Outcome: Progressing  Goal: Pace activities to prevent fatigue by end of the shift  Outcome: Progressing     Problem: Pain  Goal: My pain/discomfort is manageable  Outcome: Progressing     Problem:  Safety  Goal: Patient will be injury free during hospitalization  Outcome: Progressing  Goal: I will remain free of falls  Outcome: Progressing     Problem: Daily Care  Goal: Daily care needs are met  Outcome: Progressing     Problem: Psychosocial Needs  Goal: Demonstrates ability to cope with hospitalization/illness  Outcome: Progressing  Goal: Collaborate with me, my family, and caregiver to identify my specific goals  Outcome: Progressing     Problem: Discharge Barriers  Goal: My discharge needs are met  Outcome: Progressing     Problem: Pain - Adult  Goal: Verbalizes/displays adequate comfort level or baseline comfort level  Outcome: Progressing     Problem: Safety - Adult  Goal: Free from fall injury  Outcome: Progressing     Problem: Discharge Planning  Goal: Discharge to home or other facility with appropriate resources  Outcome: Progressing     Problem: Chronic Conditions and Co-morbidities  Goal: Patient's chronic conditions and co-morbidity symptoms are monitored and maintained or improved  Outcome: Progressing     Problem: Nutrition  Goal: Oral intake greater 75%  Outcome: Progressing  Goal: Lab values WNL  Outcome: Progressing  Goal: Maintain stable weight  Outcome: Progressing

## 2024-01-09 NOTE — PROGRESS NOTES
Occupational Therapy    Occupational Therapy    OT Treatment    Patient Name: Saleem Alba  MRN: [id number]  Today's Date: 1/9/2024  Time Calculation  Start Time: 0900  Stop Time: 0939  Time Calculation (min): 39 min         Assessment:  OT Assessment: Pt continues to require max A x2 for bed mobility. Pt very motivated to sit up to chair. Pt progressed to participating in functional transfers with max A x2 and max verbal cues for safety, sequencing and transfer technique.               Subjective     Current Problem:  Patient Active Problem List   Diagnosis    Carotid artery disease (CMS/[facility])    Carotid artery occlusion    Cerebral infarction (CMS/[facility])    Cobalamin deficiency    Depressive disorder    Essential hypertension    Hypothyroidism    Lower extremity edema    Mixed hyperlipidemia    Vitamin D deficiency    Closed fracture of left hip, initial encounter (CMS/[facility])    CAD (coronary artery disease)    CVA (cerebral vascular accident) (CMS/[facility])    Anticoagulant long-term use    Acute blood loss anemia    Enlarged prostate    Protein calorie malnutrition (CMS/[facility])    Acute hypoxic respiratory failure (CMS/[facility])    Generalized weakness    Dysarthria due to old stroke    Left-sided weakness    Ambulatory dysfunction    Physical deconditioning       General:  OT Received On: 01/09/24  Prior to Session Communication: Bedside nurse  Patient Position Received: Bed, 3 rail up, Alarm on  General Comment: Pt resting in bed, agreeable and motivated for therapy.        Pain:  Pain Assessment  Pain Assessment: 0-10  Pain Score: 0 - No pain  Objective          Functional Standing Tolerance:  Activity: Pt completed x4 static stands tolerating for 10-25 seconds with max A x2 using arm in arm assist for x2 trials and FWW for x2w trials.    Bed Mobility/Transfers: Bed Mobility  Bed Mobility: Yes  Bed Mobility 1  Bed Mobility 1: Supine to sitting, Sitting to supine  Level of Assistance 1:  (max A x2)  Transfers  Transfer:  Yes  Transfer 1  Transfer From 1: Sit to  Transfer to 1: Stand  Transfer Device 1: Walker  Transfer Level of Assistance 1: Maximum assistance, +2, Maximum verbal cues  Transfers 2  Transfer From 2: Bed to  Transfer to 2: Chair with arms  Technique 2: Sit to stand, Stand to sit  Transfer Device 2: Walker  Transfer Level of Assistance 2: Maximum assistance, +2, Maximum verbal cues                Therapy/Activity:    Therapeutic Activity  Therapeutic Activity Performed: Yes  Therapeutic Activity 1: Pt tolerated sitting EOB for 15 minute with min A x1-2.              Outcome Measures:[facility] Daily Activity  Putting on and taking off regular lower body clothing: Total  Bathing (including washing, rinsing, drying): A lot  Putting on and taking off regular upper body clothing: A lot  Toileting, which includes using toilet, bedpan or urinal: Total  Taking care of personal grooming such as brushing teeth: A lot  Eating Meals: A lot  Daily Activity - Total Score: 10  Education Documentation  Body Mechanics, taught by RIMMA Guzmán at 1/9/2024 11:41 AM.  Learner: Patient  Readiness: Acceptance  Method: Explanation  Response: Needs Reinforcement    Education Comments  No comments found.          Goals:  Encounter Problems       Encounter Problems (Active)       ADLs       Patient will perform UB and LB bathing with minimal assist  level of assistance and DME/AE as needed (Progressing)       Start:  01/06/24    Expected End:  01/08/24            Patient with complete upper body dressing with supervision level of assistance donning and doffing all UE clothes with PRN adaptive equipment while edge of bed  (Progressing)       Start:  01/06/24    Expected End:  01/08/24            Patient with complete lower body dressing with moderate assist level of assistance donning and doffing all LE clothes  with PRN adaptive equipment while edge of bed  (Progressing)       Start:  01/06/24    Expected End:  01/08/24            Patient  will feed self with set-up level of assistance using PRN adaptive equipment. (Progressing)       Start:  01/06/24    Expected End:  01/08/24            Patient will complete daily grooming tasks brushing teeth and washing face/hair with stand by assist level of assistance and PRN adaptive equipment while edge of bed . (Progressing)       Start:  01/06/24    Expected End:  01/08/24            Patient will complete toileting including hygiene clothing management/hygiene with moderate assist level of assistance and DME/AE as needed  (Progressing)       Start:  01/06/24    Expected End:  01/08/24               BALANCE       Pt will maintain sitting unsupported balance during ADL task with stand by assist level of assistance in order to demonstrate decreased risk of falling and improved postural control. (Progressing)       Start:  01/06/24    Expected End:  01/08/24

## 2024-01-09 NOTE — CARE PLAN
The patient's goals for the shift include      The clinical goals for the shift include pt will remain free from injury or falls throughout shift      Problem: Fall/Injury  Goal: Not fall by end of shift  Outcome: Progressing  Goal: Be free from injury by end of the shift  Outcome: Progressing  Goal: Verbalize understanding of personal risk factors for fall in the hospital  Outcome: Progressing  Goal: Verbalize understanding of risk factor reduction measures to prevent injury from fall in the home  Outcome: Progressing  Goal: Use assistive devices by end of the shift  Outcome: Progressing  Goal: Pace activities to prevent fatigue by end of the shift  Outcome: Progressing

## 2024-01-09 NOTE — PROGRESS NOTES
Saleem Alba is a 78 y.o. male on day 1 of admission presenting with Generalized weakness.    Subjective   Saleem Alba is a 78 y.o. 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 Patient was admitted to [place] in October for a hip fracture and following surgical repair and PT/OT evaluation, patient was discharged to SNF.  He had a prolonged course at the rehab facility as he was not making progress as quickly as they had originally planned, and only returned to home from facility about a week ago.  Once he originally returned home, he was able to ambulate around their house very slowly with a walker and assistance; however, over the last few days, he has been unable to ambulate entirely.  There have been multiple episodes where he was stumbling and too weak to continue, family noting that he would have fallen if they had not intervened and caught him.     ED Course (Summary):   Vitals on presentation: T98.2, /75, HR 52, RR 18, SpO2 95% RA  CBC: WBC 8.0, Hgb 13.3, platelets 261  CMP: Glucose 82, sodium 138, potassium 3.9, BUN 26, serum creatinine 0.85, mag 1.79  .  High-sensitivity troponin 9.  ECG with sinus rhythm, no acute ST-T changes, known right bundle branch; overall similar to prior in the system.  UA negative for infection  CXR with concern for a very small left pleural effusion but otherwise no obvious abnormalities     Her family at the bedside which included his wife, son, daughter and granddaughter they would prefer that the patient will remain at [facility] and inpatient rehab as they felt he got the best care there.  Family is not amenable to returning to [place] for skilled facility.  They state  that they may be worked with him 20 minutes a day otherwise he would remain in bed most of the time.  He also had numerous grievances with the nursing staff at the facility.  Patient's son had also experienced numerous grievances with his personal care here at the hospital as well as at other facilities.  Patient's did not express any of these concerns but does feel that he is getting weaker again.  The patient's son had also expressed concerns for recurrent strokelike symptoms given he is getting weaker again but this has been a progressive decline per his description ever since he was sent to [place] for SNF from [facility].    1/6: Patient is without acute complaint. Family's expectation is patient will go to acute rehab. I explained he will not likely be approved by his insurance. I did ask them to pick out some SNFs as well so that if acute rehab is denied we have a back-up plan in place already.      1/7: He is without acute complaint.     1/8: He remains without complaint.     1/9: He remains without complaint.        Objective     Constitutional:       General: He is not in acute distress.     Appearance: Normal appearance. He is not ill-appearing or diaphoretic.   HENT:      Head: Normocephalic and atraumatic.      Mouth/Throat:      Mouth: Mucous membranes are moist.      Pharynx: Oropharynx is clear.   Eyes:      Extraocular Movements: Extraocular movements intact.      Conjunctiva/sclera: Conjunctivae normal.      Pupils: Pupils are equal, round, and reactive to light.   Neck:      Vascular: No carotid bruit.      Comments: No JVD  Cardiovascular:      Rate and Rhythm: Normal rate and regular rhythm.      Pulses: Normal pulses.      Heart sounds: No murmur heard.  Pulmonary:      Effort: Pulmonary effort is normal. No respiratory distress.      Breath sounds: Normal breath sounds. No wheezing, rhonchi or rales.   Chest:      Chest wall: No tenderness.   Abdominal:      General: Abdomen is flat. Bowel sounds  "are normal. There is no distension.      Palpations: Abdomen is soft. There is no mass.      Tenderness: There is no abdominal tenderness. There is no guarding or rebound.   Musculoskeletal:         General: Deformity present. No tenderness or signs of injury.      Cervical back: Normal range of motion and neck supple. No rigidity or tenderness.   Lymphadenopathy:      Cervical: No cervical adenopathy.   Skin:     General: Skin is warm and dry.      Capillary Refill: Capillary refill takes less than 2 seconds.      Findings: No bruising, erythema, lesion or rash.   Neurological:      Mental Status: He is alert.      Motor: Weakness present.      Coordination: Coordination abnormal.      Gait: Gait abnormal.      Comments: limited range of motion of the left upper and lower extremity with some contractures due to hemiplegia from prior stroke and superimposed, Patient does have left facial droop, left eyelid drop, and weakness of both left upper and lower extremities motor 2/5 and notable dysarthria making it hard to truly understand what he is saying. If slowly respond it is much easier to understand him.     Psychiatric:         Mood and Affect: Mood normal.         Behavior: Behavior normal.         Thought Content: Thought content normal.         Judgment: Judgment normal.        Last Recorded Vitals  Blood pressure 115/67, pulse 60, temperature 36.5 °C (97.7 °F), temperature source Temporal, resp. rate 16, height 1.727 m (5' 8\"), weight 71.8 kg (158 lb 4.6 oz), SpO2 95 %.  Intake/Output last 3 Shifts:  I/O last 3 completed shifts:  In: 540 (7.5 mL/kg) [P.O.:540]  Out: 800 (11.1 mL/kg) [Urine:800 (0.3 mL/kg/hr)]  Weight: 71.8 kg     Relevant Results                       Assessment/Plan   Acute on chronic deconditioning and debility, ambulatory dysfunction, impaired ADLs  -Home PT/OT following discharge to home had recommendations for return to SNF given worsening weakness in the week following discharge  -No " reported new focal and/or lateralizing deficits from family   -No falls (caught by family)   -PT/OT/CM input appreciated   -Continue B12 supplementation      Prior stroke with residual left-sided weakness/dysarthria/truncal weakness  -Continue home therapies, Plavix  -Continue PT/OT support     Hypothyroidism  -Continue home levothyroxine      B12 deficiency   -Continue supplementation      Enlarged Prostate/BPH/OAB   -Continue home therapies      Protein-calorie malnutrition   -Clinical diagnosis; also supported by elevated BUN likely in relation to poor nutritional state   -Nutrition consultation appreciated      Prior left hip fracture s/p CRPP (10/07/2023)  -Outpatient follow-up with orthopedics as scheduled       Aldo Jasso MD PhD

## 2024-01-09 NOTE — PROGRESS NOTES
Physical Therapy  Physical Therapy Treatment    Patient Name: Saleem Alba  MRN: [id number]  Today's Date: 1/9/2024  Time Calculation  Start Time: 0900  Stop Time: 0939  Time Calculation (min): 39 min     Assessment/Plan   PT Plan  Treatment/Interventions: Bed mobility, Transfer training, Gait training, Balance training, Stair training, Strengthening, Endurance training, Neuromuscular re-education, Range of motion, Therapeutic exercise, Therapeutic activity, Orthotic fitting/training, Positioning, Wheelchair management  PT Plan: Skilled PT  PT Discharge Recommendations: Moderate intensity level of continued care  Equipment Recommended upon Discharge: Wheeled walker, Wheelchair  PT - OK to Discharge: Yes    General Visit Information:   PT  Visit  PT Received On: 01/09/24  Response to Previous Treatment: Patient with no complaints from previous session.    Reason for Referral: impaired mobility  Prior to Session Communication: Bedside nurse  Room: 2328    Subjective   Precautions:  Falls  WBAT LLE     Objective   Pain:  Pain Assessment  Pain Assessment: 0-10  Pain Score: 0 - No pain    Cognition:  Disoriented to situation    Activity Tolerance:  Activity Tolerance  Endurance: Tolerates 10 - 20 min exercise with multiple rests    Treatments:  Bed Mobility  Supine to sitting: maxA  Scooting: maxA    Transfers  Sit to stand: maxA+2  Stand to sit: maxA+2  Stand pivot: maxA+2    Pt remained sitting in bedside chair following tx. Alarm on and call light in reach.    Outcome Measures:  [facility] Basic Mobility  Turning from your back to your side while in a flat bed without using bedrails: A lot  Moving from lying on your back to sitting on the side of a flat bed without using bedrails: A lot  Moving to and from bed to chair (including a wheelchair): A lot  Standing up from a chair using your arms (e.g. wheelchair or bedside chair): A lot  To walk in hospital room: Total  Climbing 3-5 steps with railing: Total  Basic Mobility -  Total Score: 10    Education Documentation  Precautions, taught by Christian Mireles PTA at 1/9/2024 10:22 AM.  Learner: Patient  Readiness: Acceptance  Method: Explanation, Demonstration  Response: Needs Reinforcement    Education Comments  No comments found.        OP EDUCATION:       Encounter Problems       Encounter Problems (Active)       Balance       STG - Maintains F dynamic sitting balance without upper extremity support (Progressing)       Start:  01/06/24    Expected End:  01/08/24       INTERVENTIONS:  1. Practice sitting on the edge of a bed/mat with minimal support.  2. Educate patient about maintining total hip precautions while maintaining balance.  3. Educate patient about pressure relief.  4. Educate patient about use of assistive device.            Pain - Adult          Transfers       STG - Transfer from bed to chair using LRD with mod/max A x1 (Progressing)       Start:  01/06/24    Expected End:  01/08/24            STG - Patient will perform bed mobility including rolling side to side and supine<> sit with mod/max A x1 (Progressing)       Start:  01/06/24    Expected End:  01/08/24

## 2024-01-10 LAB
ATRIAL RATE: 56 BPM
P AXIS: -73 DEGREES
PR INTERVAL: 119 MS
Q ONSET: 251 MS
QRS COUNT: 9 BEATS
QRS DURATION: 147 MS
QT INTERVAL: 476 MS
QTC CALCULATION(BAZETT): 456 MS
QTC FREDERICIA: 462 MS
R AXIS: -11 DEGREES
T AXIS: 18 DEGREES
T OFFSET: 489 MS
VENTRICULAR RATE: 55 BPM

## 2024-01-10 PROCEDURE — 97530 THERAPEUTIC ACTIVITIES: CPT | Mod: GP,CQ

## 2024-01-10 PROCEDURE — 1200000002 HC GENERAL ROOM WITH TELEMETRY DAILY

## 2024-01-10 PROCEDURE — 97110 THERAPEUTIC EXERCISES: CPT | Mod: GP,CQ

## 2024-01-10 PROCEDURE — 2500000001 HC RX 250 WO HCPCS SELF ADMINISTERED DRUGS (ALT 637 FOR MEDICARE OP): Performed by: STUDENT IN AN ORGANIZED HEALTH CARE EDUCATION/TRAINING PROGRAM

## 2024-01-10 PROCEDURE — 94760 N-INVAS EAR/PLS OXIMETRY 1: CPT

## 2024-01-10 PROCEDURE — 92610 EVALUATE SWALLOWING FUNCTION: CPT | Mod: GN | Performed by: SPEECH-LANGUAGE PATHOLOGIST

## 2024-01-10 PROCEDURE — 2500000004 HC RX 250 GENERAL PHARMACY W/ HCPCS (ALT 636 FOR OP/ED): Performed by: STUDENT IN AN ORGANIZED HEALTH CARE EDUCATION/TRAINING PROGRAM

## 2024-01-10 PROCEDURE — 99233 SBSQ HOSP IP/OBS HIGH 50: CPT | Performed by: INTERNAL MEDICINE

## 2024-01-10 RX ADMIN — SERTRALINE HYDROCHLORIDE 100 MG: 50 TABLET ORAL at 08:36

## 2024-01-10 RX ADMIN — CLOPIDOGREL 75 MG: 75 TABLET ORAL at 08:36

## 2024-01-10 RX ADMIN — LEVOTHYROXINE SODIUM 125 MCG: 125 TABLET ORAL at 05:44

## 2024-01-10 RX ADMIN — ATORVASTATIN CALCIUM 40 MG: 40 TABLET, FILM COATED ORAL at 08:36

## 2024-01-10 RX ADMIN — PANTOPRAZOLE SODIUM 40 MG: 40 TABLET, DELAYED RELEASE ORAL at 05:44

## 2024-01-10 RX ADMIN — OXYBUTYNIN CHLORIDE 5 MG: 5 TABLET ORAL at 08:36

## 2024-01-10 RX ADMIN — OXYBUTYNIN CHLORIDE 5 MG: 5 TABLET ORAL at 21:37

## 2024-01-10 RX ADMIN — CYANOCOBALAMIN TAB 1000 MCG 1000 MCG: 1000 TAB at 08:36

## 2024-01-10 RX ADMIN — Medication 3 MG: at 21:37

## 2024-01-10 RX ADMIN — OXYBUTYNIN CHLORIDE 5 MG: 5 TABLET ORAL at 14:18

## 2024-01-10 ASSESSMENT — PAIN SCALES - GENERAL
PAINLEVEL_OUTOF10: 0 - NO PAIN

## 2024-01-10 ASSESSMENT — COGNITIVE AND FUNCTIONAL STATUS - GENERAL
MOVING TO AND FROM BED TO CHAIR: TOTAL
HELP NEEDED FOR BATHING: A LOT
STANDING UP FROM CHAIR USING ARMS: A LOT
DRESSING REGULAR UPPER BODY CLOTHING: A LOT
MOVING TO AND FROM BED TO CHAIR: TOTAL
MOBILITY SCORE: 9
CLIMB 3 TO 5 STEPS WITH RAILING: TOTAL
CLIMB 3 TO 5 STEPS WITH RAILING: TOTAL
PERSONAL GROOMING: A LOT
WALKING IN HOSPITAL ROOM: TOTAL
DAILY ACTIVITIY SCORE: 10
TURNING FROM BACK TO SIDE WHILE IN FLAT BAD: A LOT
TURNING FROM BACK TO SIDE WHILE IN FLAT BAD: A LOT
TOILETING: TOTAL
EATING MEALS: A LOT
STANDING UP FROM CHAIR USING ARMS: A LOT
DRESSING REGULAR LOWER BODY CLOTHING: TOTAL
MOVING FROM LYING ON BACK TO SITTING ON SIDE OF FLAT BED WITH BEDRAILS: A LOT
MOVING FROM LYING ON BACK TO SITTING ON SIDE OF FLAT BED WITH BEDRAILS: A LOT
WALKING IN HOSPITAL ROOM: TOTAL
MOBILITY SCORE: 9

## 2024-01-10 ASSESSMENT — PAIN - FUNCTIONAL ASSESSMENT
PAIN_FUNCTIONAL_ASSESSMENT: 0-10
PAIN_FUNCTIONAL_ASSESSMENT: 0-10

## 2024-01-10 NOTE — PROGRESS NOTES
Speech-Language Pathology Clinical Swallow Evaluation    Patient Name: Saleem Alba  MRN: [id number]  : 1945  Today's Date: 01/10/24  Start time: Start Time: 1430  Stop time: Stop Time: 1530  Time calculation (min) : Time Calculation (min): 60 min    ASSESSMENT  Impressions:  Pt exhibits an oropharyngeal phase swallow function within functional limits.     No significant deficits noted.  Pt is at his baseline     Prognosis: Good      PLAN  Recommendations:  Solid consistency: Regular (IDDSI level 7)  Liquid consistency: Thin (IDDSI 0)  Medication administration: Whole and in puree  Compensatory swallow strategies: Upright positioning for all PO intake, Remain upright for >30 min after meals, Slow rate of intake, and Alternate bites and sips, small bites/sips, Assist with tray set up and feeding if needed.  Typically pt can feed self when positioned properly and with tray set up.    Recommended frequency/duration:  Skilled SLP services recommended: No  Frequency: N/A  Duration: N/A  Discharge recommendation: SNF for continued rehab with PT/OT    Goals:  N/A      REASON FOR ADMISSION:  CHIEF COMPLAINT: presenting with generalized weakness, multiple near falls at home. Patient was admitted to [place] in October for a hip fracture and following surgical repair and PT/OT evaluation, patient was discharged to SNF.  He had a prolonged course at the rehab facility as he was not making progress as quickly as they had originally planned, and only returned to home from facility about a week ago.  Once he originally returned home, he was able to ambulate around their house very slowly with a walker and assistance; however, over the last few days, he has been unable to ambulate entirely.  There have been multiple episodes where he was stumbling and too weak to continue, family noting that he would have fallen if they had not intervened and caught him.     PMHx relevant to rehab: s/f prior stroke with residual left-sided  weakness/dysarthria/truncal weakness (dependent on family assistance when using a walker, and wheelchair otherwise), hypothyroidism, BPH, protein calorie malnutrition, and prior left hip fracture s/p CRPP (10/07/2023),     Relevant imaging results: Chest x-ray: Suspect small left effusion.  A portion of this may be loculated laterally.  No lobar consolidation.      SUBJECTIVE  SLP Received On: 01/10/24  Patient Class: Inpatient  Living Environment: Live with __ (wife)  Ordering Physician: Dr. Jasso  Reason for Referral: suspect oropharygneal dysphagia (Pt was c/o a sore throat and difficulty swallowing.)  Prior to Session Communication: Bedside nurse    RN cleared pt to participate in session and reported that He had taken his pills in applesauce without diffiuclty and at his breakfast and lunch without issues.  However, the pt c/o to his family that he had a sore throat and was having difficulty swallowing.  This prompted a formal swallow eval.   Per the physician and RN pt's throat look good without any redness.     Pt reported he had a sore throat and it hurt when swallowing.     Wife and son reported he at his soup and mashed potatoes without any trouble.           [employer] Diet: Regular diet and Thin Liquids  Current Diet : Regular diet and Thin Liquids    Pain Assessment  Pain Assessment: 0-10  Pain Score: 0 - No pain  Patient's Stated Pain Goal: No pain    Behavior/Cognition: Alert, Cooperative, Pleasant mood  Orientation: Oriented to self, Oriented to location, and Oriented to situation  Respiratory Status: Room air     Volitional Cough: Strong  Volitional Swallow: Within Functional Limits  Patient positioning: Upright in bed      OBJECTIVE  Clinical swallow evaluation completed and consisted of interview, oral motor assessment, and PO trials (3 ice chips, 5 oz of water via cup sips - single sips, 6 bites applesauce via level tsp, 4 oz of cubed pears in liquid with 3 to 5 cubes per spoonful depending on  size of cubes, 1/2 a liliya cracker).  ORAL MOTOR: Dentition: Edentulous except for 4 teeth (three on top and one on bottom.  Oral Hygiene: Oral mucosa were pink, moist, and free of obvious lesions. Pt's speech is dysarthric and difficult to understand.  Family assisted in translating for pt.      ORAL PHASE: Mastication of regular solids was prolonged, but appeared effective.  A/P transit was slow.  No oral residuals were seen after the final swallow.     PHARYNGEAL PHASE: Laryngeal elevation was visualized or palpated with all trials, however adequacy of hyolaryngeal elevation/excursion cannot be determined at bedside. No immediate or delayed s/sx aspiration/penetration were observed with any consistencies.    Pt reported that his throat was not sore during the swallow eval and that everything I gave him went down easily.    No changes to diet.  Pt at baseline.    Discontinue SLP services at this time.  Family notified to alert RN or physician if pt has any changes in swallow function.  Pt being discharged to SNF tomorrow.  SLP informed family to notify nursing staff at SNF if pt has any further episodes of swallowing difficulty and the SNF facility can have the staff SLP reassess him at that time.  Family very grateful for explanation.       Treatment/Education:  Results and recommendations were relayed to: Patient, Bedside nurse, and Physician  Education provided: Yes   Learner: Patient, Significant other, Family, and Child   Barriers to learning: None, Acuteness of illness barrier, and Cognitive limitations barrier   Method of teaching: Verbal   Topic: Role of ST, results of assessment, risk for aspiration, recommended diet modifications, recommendation for MBSS, recommended safe swallow strategies, and recommendation for dysphagia follow-up   Outcome of teaching: Pt/family demonstrated good understanding  Treatment provided: No    Next Treatment Priority: None

## 2024-01-10 NOTE — PROGRESS NOTES
Physical Therapy    Physical Therapy Treatment    Patient Name: Saleem Alba  MRN: [id number]  Today's Date: 1/10/2024  Time Calculation  Start Time: 1524  Stop Time: 1602  Time Calculation (min): 38 min       Assessment/Plan   PT Assessment  End of Session Communication: Bedside nurse, PCT/NA/CTA  Assessment Comment:  (pt tolerated sitting up in chair for 1 hour before RTB. pt pleasant and motivated)  End of Session Patient Position: Bed, 3 rail up, Alarm on     PT Plan  Treatment/Interventions: Bed mobility, Transfer training, Gait training, Balance training, Stair training, Strengthening, Endurance training, Neuromuscular re-education, Range of motion, Therapeutic exercise, Therapeutic activity, Orthotic fitting/training, Positioning, Wheelchair management  PT Plan: Skilled PT  PT Discharge Recommendations: Moderate intensity level of continued care  Equipment Recommended upon Discharge: Wheeled walker, Wheelchair  PT - OK to Discharge: Yes    General Visit Information:   PT  Visit  PT Received On: 01/10/24  General  Prior to Session Communication: Bedside nurse  Patient Position Received: Bed, 3 rail up, Alarm on    Subjective     Objective   Pain:  Pain Assessment  Pain Assessment: 0-10  Pain Score: 0 - No pain  Cognition:  Cognition  Orientation Level: Disoriented to situation    Treatments:  Therapeutic Exercise  Therapeutic Exercise Performed: Yes  Therapeutic Exercise Activity 1: pt completed supine LE exercises with Mod for LLE: heel slides x 10 reps, hip abduction/adduction x 10 reps, quad sets x 10 reps; pt completed seated RLE exercises: LAQ x 10 reps, hip flexion x 10 reps    Balance/Neuromuscular Re-Education  Balance/Neuromuscular Re-Education Activity Performed: Yes  Balance/Neuromuscular Re-Education Activity 1: pt sat EOB for 14 min with initially requiirng Mod A to maintain but able to progress to CGA; pt completed trunk rotation forward<->backwards x 2 sets of 30 second intervals    Bed  "Mobility  Bed Mobility: Yes  Bed Mobility 1  Bed Mobility 1: Supine to sitting, Sitting to supine  Level of Assistance 1: Maximum assistance, Minimal verbal cues    Transfers  Transfer: Yes  Transfer 1  Transfer From 1: Bed to  Transfer to 1: Chair with arms  Technique 1: Squat pivot  Transfer Device 1:  (arm in arm \"bear hug\")  Transfer Level of Assistance 1: Maximum assistance, Moderate verbal cues, Arm in arm assistance  Transfers 2  Transfer From 2: Chair with arms to  Transfer to 2: Bed  Technique 2: Squat pivot  Transfer Device 2:  (arm in arm \"bear hug\")  Transfer Level of Assistance 2: Maximum assistance, Moderate verbal cues, Arm in arm assistance  Trials/Comments 2: pt demonstrates short shuffling steps and has difficulty maintaining L foot on floor during transfer    Outcome Measures:  [facility] Basic Mob[facility]  Turning from your back to your side while in a flat bed without using bedrails: A lot  Moving from lying on your back to sitting on the side of a flat bed without using bedrails: A lot  Moving to and from bed to chair (including a wheelchair): Total  Standing up from a chair using your arms (e.g. wheelchair or bedside chair): A lot  To walk in hospital room: Total  Climbing 3-5 steps with railing: Total  Basic Mobility - Total Score: 9    Education Documentation  Body Mechanics, taught by Ronel Carrillo PTA at 1/10/2024  5:27 PM.  Learner: Patient  Readiness: Acceptance  Method: Explanation  Response: Needs Reinforcement    Home Exercise Program, taught by Ronel Carrillo PTA at 1/10/2024  5:27 PM.  Learner: Patient  Readiness: Acceptance  Method: Explanation  Response: Needs Reinforcement    Precautions, taught by Ronel Carrillo PTA at 1/10/2024  5:27 PM.  Learner: Patient  Readiness: Acceptance  Method: Explanation  Response: Needs Reinforcement    Mobility Training, taught by Ronel Carrillo PTA at 1/10/2024  5:27 PM.  Learner: Patient  Readiness: Acceptance  Method: Explanation  Response: Needs " Reinforcement    Education Comments  No comments found.        OP EDUCATION:       Encounter Problems       Encounter Problems (Active)       Balance       STG - Maintains F dynamic sitting balance without upper extremity support (Progressing)       Start:  01/06/24    Expected End:  01/08/24       INTERVENTIONS:  1. Practice sitting on the edge of a bed/mat with minimal support.  2. Educate patient about maintining total hip precautions while maintaining balance.  3. Educate patient about pressure relief.  4. Educate patient about use of assistive device.            Pain - Adult          Transfers       STG - Transfer from bed to chair using LRD with mod/max A x1 (Progressing)       Start:  01/06/24    Expected End:  01/08/24            STG - Patient will perform bed mobility including rolling side to side and supine<> sit with mod/max A x1 (Progressing)       Start:  01/06/24    Expected End:  01/08/24

## 2024-01-10 NOTE — PROGRESS NOTES
Saleem Alba is a 78 y.o. male on day 2 of admission presenting with Generalized weakness.    Subjective   Saleem Alba is a 78 y.o. 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 Patient was admitted to [facility] in October for a hip fracture and following surgical repair and PT/OT evaluation, patient was discharged to SNF.  He had a prolonged course at the rehab facility as he was not making progress as quickly as they had originally planned, and only returned to home from facility about a week ago.  Once he originally returned home, he was able to ambulate around their house very slowly with a walker and assistance; however, over the last few days, he has been unable to ambulate entirely.  There have been multiple episodes where he was stumbling and too weak to continue, family noting that he would have fallen if they had not intervened and caught him.     ED Course (Summary):   Vitals on presentation: T98.2, /75, HR 52, RR 18, SpO2 95% RA  CBC: WBC 8.0, Hgb 13.3, platelets 261  CMP: Glucose 82, sodium 138, potassium 3.9, BUN 26, serum creatinine 0.85, mag 1.79  .  High-sensitivity troponin 9.  ECG with sinus rhythm, no acute ST-T changes, known right bundle branch; overall similar to prior in the system.  UA negative for infection  CXR with concern for a very small left pleural effusion but otherwise no obvious abnormalities     Her family at the bedside which included his wife, son, daughter and granddaughter they would prefer that the patient will remain at [facility] and inpatient rehab as they felt he got the best care there.  Family is not amenable to returning to [place] for skilled facility.  They state  that they may be worked with him 20 minutes a day otherwise he would remain in bed most of the time.  He also had numerous grievances with the nursing staff at the facility.  Patient's son had also experienced numerous grievances with his personal care here at the hospital as well as at other facilities.  Patient's did not express any of these concerns but does feel that he is getting weaker again.  The patient's son had also expressed concerns for recurrent strokelike symptoms given he is getting weaker again but this has been a progressive decline per his description ever since he was sent to [facility] for SNF from [facility].    1/6: Patient is without acute complaint. Family's expectation is patient will go to acute rehab. I explained he will not likely be approved by his insurance. I did ask them to pick out some SNFs as well so that if acute rehab is denied we have a back-up plan in place already.      1/7: He is without acute complaint.     1/8: He remains without complaint.     1/9: He remains without complaint.     1/10: He was having some dysphagia today. I asked SLP to see him. He can likely go to SNF in the AM.          Objective     Constitutional:       General: He is not in acute distress.     Appearance: Normal appearance. He is not ill-appearing or diaphoretic.   HENT:      Head: Normocephalic and atraumatic.      Mouth/Throat:      Mouth: Mucous membranes are moist.      Pharynx: Oropharynx is clear.   Eyes:      Extraocular Movements: Extraocular movements intact.      Conjunctiva/sclera: Conjunctivae normal.      Pupils: Pupils are equal, round, and reactive to light.   Neck:      Vascular: No carotid bruit.      Comments: No JVD  Cardiovascular:      Rate and Rhythm: Normal rate and regular rhythm.      Pulses: Normal pulses.      Heart sounds: No murmur heard.  Pulmonary:      Effort: Pulmonary effort is normal. No respiratory distress.      Breath sounds: Normal breath sounds. No wheezing, rhonchi  "or rales.   Chest:      Chest wall: No tenderness.   Abdominal:      General: Abdomen is flat. Bowel sounds are normal. There is no distension.      Palpations: Abdomen is soft. There is no mass.      Tenderness: There is no abdominal tenderness. There is no guarding or rebound.   Musculoskeletal:         General: Deformity present. No tenderness or signs of injury.      Cervical back: Normal range of motion and neck supple. No rigidity or tenderness.   Lymphadenopathy:      Cervical: No cervical adenopathy.   Skin:     General: Skin is warm and dry.      Capillary Refill: Capillary refill takes less than 2 seconds.      Findings: No bruising, erythema, lesion or rash.   Neurological:      Mental Status: He is alert.      Motor: Weakness present.      Coordination: Coordination abnormal.      Gait: Gait abnormal.      Comments: limited range of motion of the left upper and lower extremity with some contractures due to hemiplegia from prior stroke and superimposed, Patient does have left facial droop, left eyelid drop, and weakness of both left upper and lower extremities motor 2/5 and notable dysarthria making it hard to truly understand what he is saying. If slowly respond it is much easier to understand him.     Psychiatric:         Mood and Affect: Mood normal.         Behavior: Behavior normal.         Thought Content: Thought content normal.         Judgment: Judgment normal.        Last Recorded Vitals  Blood pressure 104/60, pulse 52, temperature 36.6 °C (97.8 °F), resp. rate 14, height 1.727 m (5' 8\"), weight 71.8 kg (158 lb 4.6 oz), SpO2 95 %.  Intake/Output last 3 Shifts:  I/O last 3 completed shifts:  In: 1000 (13.9 mL/kg) [P.O.:1000]  Out: 1676 (23.3 mL/kg) [Urine:1675 (0.6 mL/kg/hr); Stool:1]  Weight: 71.8 kg     Relevant Results                       Assessment/Plan   Acute on chronic deconditioning and debility, ambulatory dysfunction, impaired ADLs  -Home PT/OT following discharge to home had " recommendations for return to SNF given worsening weakness in the week following discharge  -No reported new focal and/or lateralizing deficits from family   -No falls (caught by family)   -PT/OT/CM input appreciated   -Continue B12 supplementation      Prior stroke with residual left-sided weakness/dysarthria/truncal weakness  -Continue home therapies, Plavix  -Continue PT/OT support     Hypothyroidism  -Continue home levothyroxine      B12 deficiency   -Continue supplementation      Enlarged Prostate/BPH/OAB   -Continue home therapies      Protein-calorie malnutrition   -Clinical diagnosis; also supported by elevated BUN likely in relation to poor nutritional state   -Nutrition consultation appreciated      Prior left hip fracture s/p CRPP (10/07/2023)  -Outpatient follow-up with orthopedics as scheduled       Aldo Jasso MD PhD

## 2024-01-10 NOTE — CARE PLAN
The patient's goals for the shift include      The clinical goals for the shift include pt will remain free from fall or injury throughout shift      Problem: Fall/Injury  Goal: Not fall by end of shift  Outcome: Progressing  Goal: Be free from injury by end of the shift  Outcome: Progressing  Goal: Verbalize understanding of personal risk factors for fall in the hospital  Outcome: Progressing  Goal: Verbalize understanding of risk factor reduction measures to prevent injury from fall in the home  Outcome: Progressing  Goal: Use assistive devices by end of the shift  Outcome: Progressing  Goal: Pace activities to prevent fatigue by end of the shift  Outcome: Progressing

## 2024-01-10 NOTE — CARE PLAN
The patient's goals for the shift include      The clinical goals for the shift include pt will remain free from fall or injury throughout shift      Problem: Skin  Goal: Decreased wound size/increased tissue granulation at next dressing change  Outcome: Progressing  Flowsheets (Taken 1/10/2024 1804)  Decreased wound size/increased tissue granulation at next dressing change: Promote sleep for wound healing  Goal: Participates in plan/prevention/treatment measures  Outcome: Progressing  Flowsheets (Taken 1/10/2024 1804)  Participates in plan/prevention/treatment measures: Elevate heels  Goal: Prevent/manage excess moisture  Outcome: Progressing  Flowsheets (Taken 1/10/2024 1804)  Prevent/manage excess moisture: Moisturize dry skin  Goal: Prevent/minimize sheer/friction injuries  Outcome: Progressing  Flowsheets (Taken 1/10/2024 1804)  Prevent/minimize sheer/friction injuries:   Use pull sheet   HOB 30 degrees or less   Turn/reposition every 2 hours/use positioning/transfer devices  Goal: Promote/optimize nutrition  Outcome: Progressing  Flowsheets (Taken 1/10/2024 1804)  Promote/optimize nutrition: Monitor/record intake including meals  Goal: Promote skin healing  Outcome: Progressing  Flowsheets (Taken 1/10/2024 1804)  Promote skin healing: Turn/reposition every 2 hours/use positioning/transfer devices     Problem: Fall/Injury  Goal: Not fall by end of shift  Outcome: Progressing  Goal: Be free from injury by end of the shift  Outcome: Progressing  Goal: Verbalize understanding of personal risk factors for fall in the hospital  Outcome: Progressing  Goal: Verbalize understanding of risk factor reduction measures to prevent injury from fall in the home  Outcome: Progressing  Goal: Use assistive devices by end of the shift  Outcome: Progressing  Goal: Pace activities to prevent fatigue by end of the shift  Outcome: Progressing     Problem: Pain  Goal: My pain/discomfort is manageable  Outcome: Progressing     Problem:  Safety  Goal: Patient will be injury free during hospitalization  Outcome: Progressing  Goal: I will remain free of falls  Outcome: Progressing     Problem: Daily Care  Goal: Daily care needs are met  Outcome: Progressing     Problem: Psychosocial Needs  Goal: Demonstrates ability to cope with hospitalization/illness  Outcome: Progressing  Goal: Collaborate with me, my family, and caregiver to identify my specific goals  Outcome: Progressing     Problem: Discharge Barriers  Goal: My discharge needs are met  Outcome: Progressing     Problem: Pain - Adult  Goal: Verbalizes/displays adequate comfort level or baseline comfort level  Outcome: Progressing     Problem: Safety - Adult  Goal: Free from fall injury  Outcome: Progressing     Problem: Discharge Planning  Goal: Discharge to home or other facility with appropriate resources  Outcome: Progressing     Problem: Chronic Conditions and Co-morbidities  Goal: Patient's chronic conditions and co-morbidity symptoms are monitored and maintained or improved  Outcome: Progressing     Problem: Nutrition  Goal: Oral intake greater 75%  Outcome: Progressing  Goal: Lab values WNL  Outcome: Progressing  Goal: Maintain stable weight  Outcome: Progressing

## 2024-01-11 VITALS
WEIGHT: 158.29 LBS | HEIGHT: 68 IN | TEMPERATURE: 98 F | HEART RATE: 49 BPM | BODY MASS INDEX: 23.99 KG/M2 | OXYGEN SATURATION: 94 % | RESPIRATION RATE: 16 BRPM | DIASTOLIC BLOOD PRESSURE: 68 MMHG | SYSTOLIC BLOOD PRESSURE: 122 MMHG

## 2024-01-11 PROCEDURE — 2500000004 HC RX 250 GENERAL PHARMACY W/ HCPCS (ALT 636 FOR OP/ED): Performed by: STUDENT IN AN ORGANIZED HEALTH CARE EDUCATION/TRAINING PROGRAM

## 2024-01-11 PROCEDURE — 97110 THERAPEUTIC EXERCISES: CPT | Mod: CQ,GP

## 2024-01-11 PROCEDURE — 2500000001 HC RX 250 WO HCPCS SELF ADMINISTERED DRUGS (ALT 637 FOR MEDICARE OP): Performed by: STUDENT IN AN ORGANIZED HEALTH CARE EDUCATION/TRAINING PROGRAM

## 2024-01-11 PROCEDURE — 99239 HOSP IP/OBS DSCHRG MGMT >30: CPT | Performed by: INTERNAL MEDICINE

## 2024-01-11 PROCEDURE — 97530 THERAPEUTIC ACTIVITIES: CPT | Mod: CQ,GP

## 2024-01-11 RX ORDER — PANTOPRAZOLE SODIUM 40 MG/1
40 TABLET, DELAYED RELEASE ORAL DAILY
Qty: 14 TABLET | Refills: 0 | Status: SHIPPED | OUTPATIENT
Start: 2024-01-11 | End: 2024-03-15 | Stop reason: WASHOUT

## 2024-01-11 RX ADMIN — CLOPIDOGREL 75 MG: 75 TABLET ORAL at 08:55

## 2024-01-11 RX ADMIN — POLYETHYLENE GLYCOL 3350 17 G: 17 POWDER, FOR SOLUTION ORAL at 08:55

## 2024-01-11 RX ADMIN — OXYBUTYNIN CHLORIDE 5 MG: 5 TABLET ORAL at 08:55

## 2024-01-11 RX ADMIN — SERTRALINE HYDROCHLORIDE 100 MG: 50 TABLET ORAL at 08:55

## 2024-01-11 RX ADMIN — PANTOPRAZOLE SODIUM 40 MG: 40 TABLET, DELAYED RELEASE ORAL at 06:21

## 2024-01-11 RX ADMIN — LEVOTHYROXINE SODIUM 125 MCG: 125 TABLET ORAL at 06:21

## 2024-01-11 RX ADMIN — CYANOCOBALAMIN TAB 1000 MCG 1000 MCG: 1000 TAB at 08:55

## 2024-01-11 ASSESSMENT — COGNITIVE AND FUNCTIONAL STATUS - GENERAL
MOBILITY SCORE: 9
CLIMB 3 TO 5 STEPS WITH RAILING: TOTAL
MOVING TO AND FROM BED TO CHAIR: TOTAL
CLIMB 3 TO 5 STEPS WITH RAILING: TOTAL
TURNING FROM BACK TO SIDE WHILE IN FLAT BAD: A LOT
WALKING IN HOSPITAL ROOM: TOTAL
STANDING UP FROM CHAIR USING ARMS: A LOT
TURNING FROM BACK TO SIDE WHILE IN FLAT BAD: A LOT
WALKING IN HOSPITAL ROOM: TOTAL
EATING MEALS: A LOT
HELP NEEDED FOR BATHING: A LOT
DAILY ACTIVITIY SCORE: 10
STANDING UP FROM CHAIR USING ARMS: A LOT
MOBILITY SCORE: 10
DRESSING REGULAR UPPER BODY CLOTHING: A LOT
MOVING TO AND FROM BED TO CHAIR: A LOT
MOVING FROM LYING ON BACK TO SITTING ON SIDE OF FLAT BED WITH BEDRAILS: A LOT
TOILETING: TOTAL
PERSONAL GROOMING: A LOT
MOVING FROM LYING ON BACK TO SITTING ON SIDE OF FLAT BED WITH BEDRAILS: A LOT
DRESSING REGULAR LOWER BODY CLOTHING: TOTAL

## 2024-01-11 ASSESSMENT — PAIN SCALES - GENERAL
PAINLEVEL_OUTOF10: 0 - NO PAIN
PAINLEVEL_OUTOF10: 0 - NO PAIN

## 2024-01-11 ASSESSMENT — PAIN - FUNCTIONAL ASSESSMENT: PAIN_FUNCTIONAL_ASSESSMENT: 0-10

## 2024-01-11 NOTE — PROGRESS NOTES
Physical Therapy  Physical Therapy Treatment    Patient Name: Saleem Alba  MRN: [id number]  Today's Date: 1/11/2024  Time Calculation  Start Time: 0940  Stop Time: 1022  Time Calculation (min): 42 min     Assessment/Plan   PT Plan  Treatment/Interventions: Bed mobility, Transfer training, Gait training, Balance training, Stair training, Strengthening, Endurance training, Neuromuscular re-education, Range of motion, Therapeutic exercise, Therapeutic activity, Orthotic fitting/training, Positioning, Wheelchair management  PT Plan: Skilled PT  PT Discharge Recommendations: Moderate intensity level of continued care  Equipment Recommended upon Discharge: Wheeled walker, Wheelchair  PT - OK to Discharge: Yes    General Visit Information:   PT  Visit  PT Received On: 01/11/24  Response to Previous Treatment: Patient with no complaints from previous session.  Reason for Referral: impaired mobility  Prior to Session Communication: Bedside nurse  Patient Position Received: Bed, 3 rail up, Alarm on  Room: 2328    Subjective   Precautions:  Falls  WBAT LLE  LUE/LLE contractures     Objective   Pain:  Pain Score: 0 - No pain    Cognition:  Disoriented to situation    Activity Tolerance:  Activity Tolerance  Endurance: Tolerates 10 - 20 min exercise with multiple rests    Treatments:  Therapeutic Exercise  8 minute sit EOB, min/modA. Pt completed various reaching and self righting tasks while sitting EOB.  x4 reps sit<>stand, maxA. Approx 5-10 second stand each rep    Bed Mobility  Supine to sitting: maxA  Scooting: maxA    Transfers  Sit to stand: maxA  Stand to sit: maxA  Stand pivot: maxA    Other Activity  Other Activity Performed:  (chair following tx)    Outcome Measures:  [facility] Basic Mobility  Turning from your back to your side while in a flat bed without using bedrails: A lot  Moving from lying on your back to sitting on the side of a flat bed without using bedrails: A lot  Moving to and from bed to chair (including a  wheelchair): A lot  Standing up from a chair using your arms (e.g. wheelchair or bedside chair): A lot  To walk in hospital room: Total  Climbing 3-5 steps with railing: Total  Basic Mobility - Total Score: 10    Education Documentation  Mobility Training, taught by Christian Mireles PTA at 1/11/2024 11:16 AM.  Learner: Patient  Readiness: Acceptance  Method: Explanation, Demonstration  Response: Needs Reinforcement    Education Comments  No comments found.        OP EDUCATION:       Encounter Problems       Encounter Problems (Active)       Balance       STG - Maintains F dynamic sitting balance without upper extremity support (Progressing)       Start:  01/06/24    Expected End:  01/08/24       INTERVENTIONS:  1. Practice sitting on the edge of a bed/mat with minimal support.  2. Educate patient about maintining total hip precautions while maintaining balance.  3. Educate patient about pressure relief.  4. Educate patient about use of assistive device.            Pain - Adult          Transfers       STG - Transfer from bed to chair using LRD with mod/max A x1 (Progressing)       Start:  01/06/24    Expected End:  01/08/24            STG - Patient will perform bed mobility including rolling side to side and supine<> sit with mod/max A x1 (Progressing)       Start:  01/06/24    Expected End:  01/08/24

## 2024-01-11 NOTE — CARE PLAN
The patient's goals for the shift include      The clinical goals for the shift include Remain free from fall or injury throughout shift.    Over the shift, the patient did not make progress toward the following goals. Barriers to progression include none. Recommendations to address these barriers include none.

## 2024-01-11 NOTE — DISCHARGE SUMMARY
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 Do not crush, chew, or split.              CONTINUE taking these medications        Instructions Last Dose Given Next Dose Due   atorvastatin 40 mg tablet  Commonly known as: Lipitor      Take 1 tablet (40 mg) by mouth once daily.       clopidogrel 75 mg tablet  Commonly known as: Plavix      TAKE 1 TABLET BY MOUTH ONCE  DAILY       cyanocobalamin 1,000 mcg tablet  Commonly known as: Vitamin B-12           folic acid 0.8 mg capsule           levothyroxine 125 mcg tablet  Commonly known as: Synthroid, Levoxyl           levothyroxine 112 mcg tablet  Commonly known as: Synthroid, Levoxyl      TAKE 1 AND 1/2 TABLETS BY MOUTH  ON SUNDAYS AND 1 TABLET BY MOUTH ALL OTHER DAYS       oxybutynin 5 mg tablet  Commonly known as: Ditropan      TAKE 1 TABLET BY MOUTH 3 TIMES  DAILY       sennosides-docusate sodium 8.6-50 mg tablet  Commonly known as: Nora-Colace      Take 1 tablet by mouth 2 times a day.       sertraline 100 mg tablet  Commonly known as: Zoloft      Take 1 tablet (100 mg) by mouth once daily.       trospium 20 mg tablet  Commonly known as: Sanctura                     Where to Get Your Medications        These medications were sent to [facility]  [address] [place] [address]      Hours: 8AM to 6PM Mon-Fri, 8AM to 2PM Sat, 8AM to 12PM Sun Phone: [phone]   pantoprazole 40 mg EC tablet         Test Results Pending At Discharge  Pending Labs       No current pending labs.             Hospital Course  Saleem Alba is a 78 y.o. 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 Patient was admitted to [facility] in October for a hip fracture and following surgical repair and PT/OT evaluation, patient was discharged to SNF.  He had a prolonged course at the rehab facility as he was not making progress as quickly as they had originally planned, and only returned to home from facility about a week ago.  Once he originally returned home, he was able to ambulate around their house very slowly with a walker and assistance; however, over the last few days, he has been unable to ambulate entirely.  There have been multiple episodes where he was stumbling and too weak to continue, family noting that he would have fallen if they had not intervened and caught him.     ED Course (Summary):   Vitals on presentation: T98.2, /75, HR 52, RR 18, SpO2 95% RA  CBC: WBC 8.0, Hgb 13.3, platelets 261  CMP: Glucose 82, sodium 138, potassium 3.9, BUN 26, serum creatinine 0.85, mag 1.79  .  High-sensitivity troponin 9.  ECG with sinus rhythm, no acute ST-T changes, known right bundle branch; overall similar to prior in the system.  UA negative for infection  CXR with concern for a very small left pleural effusion but otherwise no obvious abnormalities     Her family at the bedside which included his wife, son, daughter and granddaughter they would prefer that the patient will remain at [facility] and inpatient rehab as they felt he got the best care there.  Family is not amenable to returning to [facility] skilled facility.  They state that they may be worked with him 20 minutes a day otherwise he would remain in bed  most of the time.  He also had numerous grievances with the nursing staff at the facility.  Patient's son had also experienced numerous grievances with his personal care here at the hospital as well as at other facilities.  Patient's did not express any of these concerns but does feel that he is getting weaker again.  The patient's son had also expressed concerns for recurrent strokelike symptoms given he is getting weaker again but this has been a progressive decline per his description ever since he was sent to [place] for SNF from [facility].     1/6: Patient is without acute complaint. Family's expectation is patient will go to acute rehab. I explained he will not likely be approved by his insurance. I did ask them to pick out some SNFs as well so that if acute rehab is denied we have a back-up plan in place already.       1/7: He is without acute complaint.      1/8: He remains without complaint.      1/9: He remains without complaint.      1/10: He was having some dysphagia today. I asked SLP to see him. He can likely go to SNF in the AM.  1/11: Discharged to skilled nursing facility in stable condition.    Discharge took about 35 minutes    Pertinent Physical Exam At Time of Discharge  Physical Exam  Constitutional:       General: He is not in acute distress.     Appearance: Normal appearance. He is not ill-appearing or diaphoretic.   HENT:      Head: Normocephalic and atraumatic.      Mouth/Throat:      Mouth: Mucous membranes are moist.      Pharynx: Oropharynx is clear.   Eyes:      Extraocular Movements: Extraocular movements intact.      Conjunctiva/sclera: Conjunctivae normal.      Pupils: Pupils are equal, round, and reactive to light.   Neck:      Vascular: No carotid bruit.      Comments: No JVD  Cardiovascular:      Rate and Rhythm: Normal rate and regular rhythm.      Pulses: Normal pulses.      Heart sounds: No murmur heard.  Pulmonary:      Effort: Pulmonary effort is normal. No respiratory  distress.      Breath sounds: Normal breath sounds. No wheezing, rhonchi or rales.   Chest:      Chest wall: No tenderness.   Abdominal:      General: Abdomen is flat. Bowel sounds are normal. There is no distension.      Palpations: Abdomen is soft. There is no mass.      Tenderness: There is no abdominal tenderness. There is no guarding or rebound.   Musculoskeletal:         General: Deformity present. No tenderness or signs of injury.      Cervical back: Normal range of motion and neck supple. No rigidity or tenderness.   Lymphadenopathy:      Cervical: No cervical adenopathy.   Skin:     General: Skin is warm and dry.      Capillary Refill: Capillary refill takes less than 2 seconds.      Findings: No bruising, erythema, lesion or rash.   Neurological:      Mental Status: He is alert.      Motor: Weakness present.      Coordination: Coordination abnormal.      Gait: Gait abnormal.      Comments: limited range of motion of the left upper and lower extremity with some contractures due to hemiplegia from prior stroke and superimposed, Patient does have left facial droop, left eyelid drop, and weakness of both left upper and lower extremities motor 2/5 and notable dysarthria making it hard to truly understand what he is saying. If slowly respond it is much easier to understand him.     Psychiatric:         Mood and Affect: Mood normal.         Behavior: Behavior normal.         Thought Content: Thought content normal.         Judgment: Judgment normal.   Outpatient Follow-Up  Future Appointments   Date Time Provider Department Center   6/17/2024 11:30 AM HIREN Corrales-CNP [id number] MD Angela

## 2024-01-11 NOTE — CARE PLAN
The patient's goals for the shift include      The clinical goals for the shift include Pt. will remain free from falls and injuries throughout the shift.      Problem: Skin  Goal: Decreased wound size/increased tissue granulation at next dressing change  Outcome: Progressing  Flowsheets (Taken 1/11/2024 0957)  Decreased wound size/increased tissue granulation at next dressing change: Promote sleep for wound healing  Goal: Participates in plan/prevention/treatment measures  Outcome: Progressing  Flowsheets (Taken 1/11/2024 0957)  Participates in plan/prevention/treatment measures: Elevate heels  Goal: Prevent/manage excess moisture  Outcome: Progressing  Flowsheets (Taken 1/11/2024 0957)  Prevent/manage excess moisture: Moisturize dry skin  Goal: Prevent/minimize sheer/friction injuries  Outcome: Progressing  Flowsheets (Taken 1/11/2024 0957)  Prevent/minimize sheer/friction injuries:   Use pull sheet   HOB 30 degrees or less   Turn/reposition every 2 hours/use positioning/transfer devices  Goal: Promote/optimize nutrition  Outcome: Progressing  Flowsheets (Taken 1/11/2024 0957)  Promote/optimize nutrition: Monitor/record intake including meals  Goal: Promote skin healing  Outcome: Progressing  Flowsheets (Taken 1/11/2024 0957)  Promote skin healing: Turn/reposition every 2 hours/use positioning/transfer devices     Problem: Fall/Injury  Goal: Not fall by end of shift  Outcome: Progressing  Goal: Be free from injury by end of the shift  Outcome: Progressing  Goal: Verbalize understanding of personal risk factors for fall in the hospital  Outcome: Progressing  Goal: Verbalize understanding of risk factor reduction measures to prevent injury from fall in the home  Outcome: Progressing  Goal: Use assistive devices by end of the shift  Outcome: Progressing  Goal: Pace activities to prevent fatigue by end of the shift  Outcome: Progressing     Problem: Pain  Goal: My pain/discomfort is manageable  Outcome: Progressing      Problem: Safety  Goal: Patient will be injury free during hospitalization  Outcome: Progressing  Goal: I will remain free of falls  Outcome: Progressing     Problem: Daily Care  Goal: Daily care needs are met  Outcome: Progressing     Problem: Psychosocial Needs  Goal: Demonstrates ability to cope with hospitalization/illness  Outcome: Progressing  Goal: Collaborate with me, my family, and caregiver to identify my specific goals  Outcome: Progressing     Problem: Discharge Barriers  Goal: My discharge needs are met  Outcome: Progressing     Problem: Pain - Adult  Goal: Verbalizes/displays adequate comfort level or baseline comfort level  Outcome: Progressing     Problem: Safety - Adult  Goal: Free from fall injury  Outcome: Progressing     Problem: Discharge Planning  Goal: Discharge to home or other facility with appropriate resources  Outcome: Progressing     Problem: Chronic Conditions and Co-morbidities  Goal: Patient's chronic conditions and co-morbidity symptoms are monitored and maintained or improved  Outcome: Progressing     Problem: Nutrition  Goal: Oral intake greater 75%  Outcome: Progressing  Goal: Lab values WNL  Outcome: Progressing  Goal: Maintain stable weight  Outcome: Progressing

## 2024-01-12 ENCOUNTER — APPOINTMENT (OUTPATIENT)
Dept: ORTHOPEDIC SURGERY | Facility: HOSPITAL | Age: 79
End: 2024-01-12
Payer: MEDICARE

## 2024-02-23 ENCOUNTER — DOCUMENTATION (OUTPATIENT)
Dept: HOME HEALTH SERVICES | Facility: HOME HEALTH | Age: 79
End: 2024-02-23
Payer: MEDICARE

## 2024-02-23 ENCOUNTER — HOME HEALTH ADMISSION (OUTPATIENT)
Dept: HOME HEALTH SERVICES | Facility: HOME HEALTH | Age: 79
End: 2024-02-23
Payer: MEDICARE

## 2024-02-23 ENCOUNTER — HOSPITAL ENCOUNTER (OUTPATIENT)
Dept: RADIOLOGY | Facility: HOSPITAL | Age: 79
End: 2024-02-23
Payer: MEDICARE

## 2024-02-23 NOTE — HH CARE COORDINATION
Home Care received a Referral for Physical Therapy and Occupational Therapy. We have processed the referral for a Start of Care on 02.24.24.     If you have any questions or concerns, please feel free to contact us at [phone]. Follow the prompts, enter your five digit zip code, and you will be directed to your care team on [facility].

## 2024-02-27 ENCOUNTER — HOME CARE VISIT (OUTPATIENT)
Dept: HOME HEALTH SERVICES | Facility: HOME HEALTH | Age: 79
End: 2024-02-27
Payer: MEDICARE

## 2024-02-27 ENCOUNTER — APPOINTMENT (OUTPATIENT)
Dept: PRIMARY CARE | Facility: CLINIC | Age: 79
End: 2024-02-27
Payer: MEDICARE

## 2024-02-27 VITALS — RESPIRATION RATE: 14 BRPM

## 2024-02-27 PROCEDURE — 1090000001 HH PPS REVENUE CREDIT

## 2024-02-27 PROCEDURE — 1090000002 HH PPS REVENUE DEBIT

## 2024-02-27 PROCEDURE — 0023 HH SOC

## 2024-02-27 PROCEDURE — 169592 NO-PAY CLAIM PROCEDURE

## 2024-02-27 PROCEDURE — G0151 HHCP-SERV OF PT,EA 15 MIN: HCPCS | Mod: HHH

## 2024-02-27 SDOH — HEALTH STABILITY: PHYSICAL HEALTH: EXERCISE ACTIVITY: B LES, TRUNK

## 2024-02-27 SDOH — HEALTH STABILITY: PHYSICAL HEALTH: EXERCISE ACTIVITIES SETS: 1

## 2024-02-27 SDOH — HEALTH STABILITY: PHYSICAL HEALTH: PHYSICAL EXERCISE: SIT, RECLINED

## 2024-02-27 SDOH — HEALTH STABILITY: PHYSICAL HEALTH: PHYSICAL EXERCISE: 5

## 2024-02-27 ASSESSMENT — ACTIVITIES OF DAILY LIVING (ADL)
ENTERING_EXITING_HOME: MAXIMUM ASSIST
OASIS_M1830: 06
PHYSICAL TRANSFERS ASSESSED: 1
CURRENT_FUNCTION: MAXIMUM ASSIST
AMBULATION_DISTANCE/DURATION_TOLERATED: NON AMBULATORY

## 2024-02-27 ASSESSMENT — ENCOUNTER SYMPTOMS
PERSON REPORTING PAIN: PATIENT
DENIES PAIN: 1
HIGHEST PAIN SEVERITY IN PAST 24 HOURS: 0/10
LIMITED RANGE OF MOTION: 1
MUSCLE WEAKNESS: 1

## 2024-02-28 PROCEDURE — 1090000001 HH PPS REVENUE CREDIT

## 2024-02-28 PROCEDURE — 1090000002 HH PPS REVENUE DEBIT

## 2024-02-29 ENCOUNTER — HOME CARE VISIT (OUTPATIENT)
Dept: HOME HEALTH SERVICES | Facility: HOME HEALTH | Age: 79
End: 2024-02-29
Payer: MEDICARE

## 2024-02-29 ENCOUNTER — TELEPHONE (OUTPATIENT)
Dept: ORTHOPEDIC SURGERY | Facility: HOSPITAL | Age: 79
End: 2024-02-29
Payer: MEDICARE

## 2024-02-29 PROCEDURE — 1090000001 HH PPS REVENUE CREDIT

## 2024-02-29 PROCEDURE — 1090000002 HH PPS REVENUE DEBIT

## 2024-02-29 PROCEDURE — G0157 HHC PT ASSISTANT EA 15: HCPCS | Mod: HHH

## 2024-02-29 NOTE — TELEPHONE ENCOUNTER
Talked to Mrs. Alba and she is unable to get Mr. Alba out of the house at this time so we have set up a telephone visit for 3/5/24 @ 11:20a.

## 2024-03-01 VITALS
OXYGEN SATURATION: 98 % | RESPIRATION RATE: 18 BRPM | DIASTOLIC BLOOD PRESSURE: 78 MMHG | SYSTOLIC BLOOD PRESSURE: 126 MMHG | HEART RATE: 88 BPM

## 2024-03-01 PROCEDURE — 1090000001 HH PPS REVENUE CREDIT

## 2024-03-01 PROCEDURE — 1090000002 HH PPS REVENUE DEBIT

## 2024-03-01 SDOH — HEALTH STABILITY: PHYSICAL HEALTH
EXERCISE COMMENTS: INITIATED PROGRAM SITTING LAQ SINGLE AND DOUBLE, MARCHING, ANKLE PUMPS,  SUPINE BRIDGING , HIP ADD, ABD, HEEL SLIDES 3 X 10 .  WRITTEN HEP . EDUCATION ON POSITIONING AND QUALITY FOR MAX BENEFIT.

## 2024-03-01 SDOH — HEALTH STABILITY: PHYSICAL HEALTH: EXERCISE TYPE: ROM, AROM

## 2024-03-01 ASSESSMENT — ENCOUNTER SYMPTOMS: DENIES PAIN: 1

## 2024-03-02 PROCEDURE — 1090000002 HH PPS REVENUE DEBIT

## 2024-03-02 PROCEDURE — 1090000001 HH PPS REVENUE CREDIT

## 2024-03-03 PROCEDURE — 1090000001 HH PPS REVENUE CREDIT

## 2024-03-03 PROCEDURE — 1090000002 HH PPS REVENUE DEBIT

## 2024-03-04 ENCOUNTER — HOME CARE VISIT (OUTPATIENT)
Dept: HOME HEALTH SERVICES | Facility: HOME HEALTH | Age: 79
End: 2024-03-04
Payer: MEDICARE

## 2024-03-04 PROCEDURE — 1090000001 HH PPS REVENUE CREDIT

## 2024-03-04 PROCEDURE — G0157 HHC PT ASSISTANT EA 15: HCPCS | Mod: HHH

## 2024-03-04 PROCEDURE — G0152 HHCP-SERV OF OT,EA 15 MIN: HCPCS | Mod: HHH

## 2024-03-04 PROCEDURE — 1090000002 HH PPS REVENUE DEBIT

## 2024-03-04 ASSESSMENT — ENCOUNTER SYMPTOMS
AGGRESSION WITHIN DEFINED LIMITS: 1
DESCRIPTION OF MEMORY LOSS: IMMEDIATE
DOUBLE VISION: 0
DENIES PAIN: 1
PERSON REPORTING PAIN: PATIENT
ANGER WITHIN DEFINED LIMITS: 1
NYSTAGMUS: 0
DESCRIPTION OF MEMORY LOSS: SHORT TERM

## 2024-03-05 ENCOUNTER — APPOINTMENT (OUTPATIENT)
Dept: ORTHOPEDIC SURGERY | Facility: CLINIC | Age: 79
End: 2024-03-05
Payer: MEDICARE

## 2024-03-05 PROCEDURE — 1090000001 HH PPS REVENUE CREDIT

## 2024-03-05 PROCEDURE — 1090000002 HH PPS REVENUE DEBIT

## 2024-03-05 ASSESSMENT — ENCOUNTER SYMPTOMS: DENIES PAIN: 1

## 2024-03-06 ENCOUNTER — HOME CARE VISIT (OUTPATIENT)
Dept: HOME HEALTH SERVICES | Facility: HOME HEALTH | Age: 79
End: 2024-03-06
Payer: MEDICARE

## 2024-03-06 PROCEDURE — G0180 MD CERTIFICATION HHA PATIENT: HCPCS | Performed by: FAMILY MEDICINE

## 2024-03-06 PROCEDURE — G0157 HHC PT ASSISTANT EA 15: HCPCS | Mod: HHH

## 2024-03-06 PROCEDURE — 1090000002 HH PPS REVENUE DEBIT

## 2024-03-06 PROCEDURE — 1090000001 HH PPS REVENUE CREDIT

## 2024-03-07 ENCOUNTER — TELEMEDICINE (OUTPATIENT)
Dept: ORTHOPEDIC SURGERY | Facility: CLINIC | Age: 79
End: 2024-03-07
Payer: MEDICARE

## 2024-03-07 ENCOUNTER — HOME CARE VISIT (OUTPATIENT)
Dept: HOME HEALTH SERVICES | Facility: HOME HEALTH | Age: 79
End: 2024-03-07
Payer: MEDICARE

## 2024-03-07 VITALS — OXYGEN SATURATION: 95 % | HEART RATE: 60 BPM | TEMPERATURE: 97.7 F

## 2024-03-07 PROCEDURE — 1036F TOBACCO NON-USER: CPT | Performed by: STUDENT IN AN ORGANIZED HEALTH CARE EDUCATION/TRAINING PROGRAM

## 2024-03-07 PROCEDURE — 99443 PR PHYS/QHP TELEPHONE EVALUATION 21-30 MIN: CPT | Performed by: STUDENT IN AN ORGANIZED HEALTH CARE EDUCATION/TRAINING PROGRAM

## 2024-03-07 PROCEDURE — 1090000001 HH PPS REVENUE CREDIT

## 2024-03-07 PROCEDURE — 1090000002 HH PPS REVENUE DEBIT

## 2024-03-07 PROCEDURE — G0152 HHCP-SERV OF OT,EA 15 MIN: HCPCS | Mod: HHH

## 2024-03-07 PROCEDURE — 1159F MED LIST DOCD IN RCRD: CPT | Performed by: STUDENT IN AN ORGANIZED HEALTH CARE EDUCATION/TRAINING PROGRAM

## 2024-03-07 SDOH — HEALTH STABILITY: PHYSICAL HEALTH
EXERCISE COMMENTS: SEATED EOB, CORE STRENGTHENING AND BALANCE, LAQ, MARCHING, HIP ADD,ABD, LATERAL STEPPING SITTTING. STANDING BUE SUPPORT MIN ASSIST, LATERAL SHIFTING, STDANDING BALANCE, MINISQUATS 2 X 5 MIN ASSIST WITH GT. BELT.

## 2024-03-07 ASSESSMENT — ENCOUNTER SYMPTOMS
DENIES PAIN: 1
PAIN LOCATION - PAIN QUALITY: SHARP
PERSON REPORTING PAIN: PATIENT
PAIN LOCATION - PAIN SEVERITY: 3/10
PAIN: 1
PAIN LOCATION: LEFT HAND
LOWEST PAIN SEVERITY IN PAST 24 HOURS: 0/10
PAIN LOCATION - PAIN DURATION: DALY
HIGHEST PAIN SEVERITY IN PAST 24 HOURS: 3/10
PAIN LOCATION - RELIEVING FACTORS: REST
PAIN LOCATION - PAIN FREQUENCY: INTERMITTENT

## 2024-03-08 ENCOUNTER — TELEPHONE (OUTPATIENT)
Dept: PRIMARY CARE | Facility: CLINIC | Age: 79
End: 2024-03-08
Payer: MEDICARE

## 2024-03-08 PROCEDURE — 1090000002 HH PPS REVENUE DEBIT

## 2024-03-08 PROCEDURE — 1090000001 HH PPS REVENUE CREDIT

## 2024-03-08 NOTE — TELEPHONE ENCOUNTER
----- Message from Marni Anderson sent at 3/8/2024  1:44 PM EST -----  Regarding: pt due for yearly 4/2024

## 2024-03-08 NOTE — TELEPHONE ENCOUNTER
Spoke with wife-pt is currently wheelchair bound and she is unable to move him by herself. Pt is doing physical therapy. Pt's wife said she will cb when ready to schedule.

## 2024-03-09 PROCEDURE — 1090000001 HH PPS REVENUE CREDIT

## 2024-03-09 PROCEDURE — 1090000002 HH PPS REVENUE DEBIT

## 2024-03-10 PROCEDURE — 1090000001 HH PPS REVENUE CREDIT

## 2024-03-10 PROCEDURE — 1090000002 HH PPS REVENUE DEBIT

## 2024-03-11 ENCOUNTER — HOME CARE VISIT (OUTPATIENT)
Dept: HOME HEALTH SERVICES | Facility: HOME HEALTH | Age: 79
End: 2024-03-11
Payer: MEDICARE

## 2024-03-11 VITALS — HEART RATE: 61 BPM | TEMPERATURE: 98.1 F | OXYGEN SATURATION: 94 %

## 2024-03-11 PROCEDURE — G0152 HHCP-SERV OF OT,EA 15 MIN: HCPCS | Mod: HHH

## 2024-03-11 PROCEDURE — G0157 HHC PT ASSISTANT EA 15: HCPCS | Mod: HHH

## 2024-03-11 PROCEDURE — 1090000002 HH PPS REVENUE DEBIT

## 2024-03-11 PROCEDURE — 1090000001 HH PPS REVENUE CREDIT

## 2024-03-11 ASSESSMENT — ENCOUNTER SYMPTOMS
DENIES PAIN: 1
PERSON REPORTING PAIN: PATIENT

## 2024-03-12 PROCEDURE — 1090000001 HH PPS REVENUE CREDIT

## 2024-03-12 PROCEDURE — 1090000002 HH PPS REVENUE DEBIT

## 2024-03-12 ASSESSMENT — ENCOUNTER SYMPTOMS: DENIES PAIN: 1

## 2024-03-13 ENCOUNTER — HOME CARE VISIT (OUTPATIENT)
Dept: HOME HEALTH SERVICES | Facility: HOME HEALTH | Age: 79
End: 2024-03-13
Payer: MEDICARE

## 2024-03-13 ENCOUNTER — TELEPHONE (OUTPATIENT)
Dept: PRIMARY CARE | Facility: CLINIC | Age: 79
End: 2024-03-13

## 2024-03-13 VITALS — SYSTOLIC BLOOD PRESSURE: 138 MMHG | OXYGEN SATURATION: 98 % | DIASTOLIC BLOOD PRESSURE: 84 MMHG | HEART RATE: 78 BPM

## 2024-03-13 PROCEDURE — 1090000001 HH PPS REVENUE CREDIT

## 2024-03-13 PROCEDURE — G0157 HHC PT ASSISTANT EA 15: HCPCS | Mod: HHH

## 2024-03-13 PROCEDURE — 1090000002 HH PPS REVENUE DEBIT

## 2024-03-13 SDOH — ECONOMIC STABILITY: HOUSING INSECURITY: HOME SAFETY: NO RAMP FOR EGRESS.

## 2024-03-13 ASSESSMENT — ENCOUNTER SYMPTOMS: DENIES PAIN: 1

## 2024-03-13 NOTE — TELEPHONE ENCOUNTER
Saeid mcclendon from  Homecare called the back line wanting to remind [facility] that there is an outstanding order for pt and didn't want it sitting in inbasket too long

## 2024-03-14 ENCOUNTER — HOME CARE VISIT (OUTPATIENT)
Dept: HOME HEALTH SERVICES | Facility: HOME HEALTH | Age: 79
End: 2024-03-14
Payer: MEDICARE

## 2024-03-14 VITALS — TEMPERATURE: 97.4 F

## 2024-03-14 PROCEDURE — G0152 HHCP-SERV OF OT,EA 15 MIN: HCPCS | Mod: HHH

## 2024-03-14 PROCEDURE — 1090000001 HH PPS REVENUE CREDIT

## 2024-03-14 PROCEDURE — 1090000002 HH PPS REVENUE DEBIT

## 2024-03-14 ASSESSMENT — ENCOUNTER SYMPTOMS
PAIN LOCATION - PAIN SEVERITY: 6/10
HIGHEST PAIN SEVERITY IN PAST 24 HOURS: 6/10
PAIN LOCATION: LEFT HAND
PAIN LOCATION - PAIN DURATION: DAILY
LOWEST PAIN SEVERITY IN PAST 24 HOURS: 0/10
PAIN LOCATION - PAIN QUALITY: SHARP
PAIN LOCATION - RELIEVING FACTORS: REST
PAIN LOCATION - PAIN FREQUENCY: INTERMITTENT
PERSON REPORTING PAIN: PATIENT
PAIN: 1
SUBJECTIVE PAIN PROGRESSION: WAXING AND WANING

## 2024-03-14 ASSESSMENT — ACTIVITIES OF DAILY LIVING (ADL)
GROOMING_CURRENT_FUNCTION: MODERATE ASSIST
GROOMING ASSESSED: 1

## 2024-03-15 ENCOUNTER — HOME CARE VISIT (OUTPATIENT)
Dept: HOME HEALTH SERVICES | Facility: HOME HEALTH | Age: 79
End: 2024-03-15
Payer: MEDICARE

## 2024-03-15 PROCEDURE — G0321 HH/HSPC MSW PHONE VISIT: HCPCS | Mod: HHH

## 2024-03-15 PROCEDURE — 1090000002 HH PPS REVENUE DEBIT

## 2024-03-15 PROCEDURE — 1090000001 HH PPS REVENUE CREDIT

## 2024-03-16 PROCEDURE — 1090000002 HH PPS REVENUE DEBIT

## 2024-03-16 PROCEDURE — 1090000001 HH PPS REVENUE CREDIT

## 2024-03-17 PROCEDURE — 1090000001 HH PPS REVENUE CREDIT

## 2024-03-17 PROCEDURE — 1090000002 HH PPS REVENUE DEBIT

## 2024-03-18 ENCOUNTER — HOME CARE VISIT (OUTPATIENT)
Dept: HOME HEALTH SERVICES | Facility: HOME HEALTH | Age: 79
End: 2024-03-18
Payer: MEDICARE

## 2024-03-18 VITALS — TEMPERATURE: 97.5 F

## 2024-03-18 PROCEDURE — 1090000001 HH PPS REVENUE CREDIT

## 2024-03-18 PROCEDURE — G0157 HHC PT ASSISTANT EA 15: HCPCS | Mod: HHH

## 2024-03-18 PROCEDURE — 1090000002 HH PPS REVENUE DEBIT

## 2024-03-18 PROCEDURE — G0152 HHCP-SERV OF OT,EA 15 MIN: HCPCS | Mod: HHH

## 2024-03-18 ASSESSMENT — ENCOUNTER SYMPTOMS
DENIES PAIN: 1
PERSON REPORTING PAIN: PATIENT

## 2024-03-18 ASSESSMENT — ACTIVITIES OF DAILY LIVING (ADL)
AMBULATION ASSISTANCE ON FLAT SURFACES: 1
CURRENT_FUNCTION: MINIMUM ASSIST
PHYSICAL TRANSFERS ASSESSED: 1

## 2024-03-19 PROCEDURE — 1090000001 HH PPS REVENUE CREDIT

## 2024-03-19 PROCEDURE — 1090000002 HH PPS REVENUE DEBIT

## 2024-03-20 ENCOUNTER — HOME CARE VISIT (OUTPATIENT)
Dept: HOME HEALTH SERVICES | Facility: HOME HEALTH | Age: 79
End: 2024-03-20
Payer: MEDICARE

## 2024-03-20 VITALS — TEMPERATURE: 97.8 F

## 2024-03-20 PROCEDURE — G0152 HHCP-SERV OF OT,EA 15 MIN: HCPCS | Mod: HHH

## 2024-03-20 PROCEDURE — 1090000001 HH PPS REVENUE CREDIT

## 2024-03-20 PROCEDURE — G0157 HHC PT ASSISTANT EA 15: HCPCS | Mod: HHH

## 2024-03-20 PROCEDURE — 1090000002 HH PPS REVENUE DEBIT

## 2024-03-20 ASSESSMENT — ACTIVITIES OF DAILY LIVING (ADL)
AMBULATION ASSISTANCE: MINIMUM ASSIST
AMBULATION ASSISTANCE: 1

## 2024-03-20 ASSESSMENT — ENCOUNTER SYMPTOMS
DENIES PAIN: 1
DENIES PAIN: 1
PERSON REPORTING PAIN: PATIENT

## 2024-03-20 NOTE — PROGRESS NOTES
This is a telephone conversation held with the patient on 3/7/2024.  The patient is roughly 5-month status post left hip CRPP for a nondisplaced femoral neck fracture.  The patient was unable to present to clinic today due to inability to obtain transportation.  I spoke to him and his wife who report that his pain is controlled to the left hip and is no longer present.  He is able to ambulate at the level of his baseline status prior to his injury.    I have asked the patient to follow-up in roughly 6 months for a 1 year follow-up visit.  At that time I would like to obtain x-rays of the AP pelvis and left hip.  They are in understanding agreement with this plan.  All questions were answered.    Donnie Meek MD, MPH

## 2024-03-21 PROCEDURE — 1090000002 HH PPS REVENUE DEBIT

## 2024-03-21 PROCEDURE — 1090000001 HH PPS REVENUE CREDIT

## 2024-03-22 PROCEDURE — 1090000001 HH PPS REVENUE CREDIT

## 2024-03-22 PROCEDURE — 1090000002 HH PPS REVENUE DEBIT

## 2024-03-23 PROCEDURE — 1090000002 HH PPS REVENUE DEBIT

## 2024-03-23 PROCEDURE — 1090000001 HH PPS REVENUE CREDIT

## 2024-03-24 PROCEDURE — 1090000001 HH PPS REVENUE CREDIT

## 2024-03-24 PROCEDURE — 1090000002 HH PPS REVENUE DEBIT

## 2024-03-25 ENCOUNTER — HOME CARE VISIT (OUTPATIENT)
Dept: HOME HEALTH SERVICES | Facility: HOME HEALTH | Age: 79
End: 2024-03-25
Payer: MEDICARE

## 2024-03-25 VITALS — TEMPERATURE: 98.4 F

## 2024-03-25 VITALS
RESPIRATION RATE: 14 BRPM | TEMPERATURE: 97.9 F | OXYGEN SATURATION: 98 % | DIASTOLIC BLOOD PRESSURE: 88 MMHG | HEART RATE: 50 BPM | SYSTOLIC BLOOD PRESSURE: 144 MMHG

## 2024-03-25 PROCEDURE — G0152 HHCP-SERV OF OT,EA 15 MIN: HCPCS | Mod: HHH

## 2024-03-25 PROCEDURE — G0151 HHCP-SERV OF PT,EA 15 MIN: HCPCS | Mod: HHH

## 2024-03-25 PROCEDURE — 1090000002 HH PPS REVENUE DEBIT

## 2024-03-25 PROCEDURE — 1090000001 HH PPS REVENUE CREDIT

## 2024-03-25 SDOH — HEALTH STABILITY: PHYSICAL HEALTH: EXERCISE ACTIVITIES SETS: 1

## 2024-03-25 SDOH — HEALTH STABILITY: PHYSICAL HEALTH: PHYSICAL EXERCISE: 10

## 2024-03-25 SDOH — HEALTH STABILITY: PHYSICAL HEALTH: PHYSICAL EXERCISE: SIT, RECLINED

## 2024-03-25 SDOH — HEALTH STABILITY: PHYSICAL HEALTH: EXERCISE ACTIVITY: B LES, TRUNK

## 2024-03-25 ASSESSMENT — ENCOUNTER SYMPTOMS
DENIES PAIN: 1
MUSCLE WEAKNESS: 1

## 2024-03-25 ASSESSMENT — ACTIVITIES OF DAILY LIVING (ADL)
AMBULATION ASSISTANCE: ONE PERSON
CURRENT_FUNCTION: MAXIMUM ASSIST
AMBULATION ASSISTANCE: 1
PHYSICAL TRANSFERS ASSESSED: 1
AMBULATION ASSISTANCE ON FLAT SURFACES: 1
AMBULATION ASSISTANCE: MAXIMUM ASSIST
CURRENT_FUNCTION: ONE PERSON

## 2024-03-26 PROCEDURE — 1090000002 HH PPS REVENUE DEBIT

## 2024-03-26 PROCEDURE — 1090000001 HH PPS REVENUE CREDIT

## 2024-03-26 NOTE — CASE COMMUNICATION
Scott Hernandez,    Could you please fax a prescription for a new custom AFO for left ankle for this patient. They will come to his home since he cannot get out of his house. The one that he has is very old and does not fit properly.  DX: CVA with left hemiparesis. [facility] Fax: [phone] and phone is [phone];    Thank you very much,   Ramos De Oliveira, Physical Therapist  [facility]

## 2024-03-27 ENCOUNTER — HOME CARE VISIT (OUTPATIENT)
Dept: HOME HEALTH SERVICES | Facility: HOME HEALTH | Age: 79
End: 2024-03-27
Payer: MEDICARE

## 2024-03-27 VITALS — TEMPERATURE: 97.7 F

## 2024-03-27 DIAGNOSIS — I63.9 CEREBROVASCULAR ACCIDENT (CVA), UNSPECIFIED MECHANISM (MULTI): Primary | ICD-10-CM

## 2024-03-27 PROCEDURE — 1090000001 HH PPS REVENUE CREDIT

## 2024-03-27 PROCEDURE — G0152 HHCP-SERV OF OT,EA 15 MIN: HCPCS | Mod: HHH

## 2024-03-27 PROCEDURE — 1090000003 HH PPS REVENUE ADJ

## 2024-03-27 PROCEDURE — 1090000002 HH PPS REVENUE DEBIT

## 2024-03-27 ASSESSMENT — ENCOUNTER SYMPTOMS
DENIES PAIN: 1
PERSON REPORTING PAIN: PATIENT

## 2024-03-27 ASSESSMENT — ACTIVITIES OF DAILY LIVING (ADL)
PHYSICAL TRANSFERS ASSESSED: 1
CURRENT_FUNCTION: MINIMUM ASSIST

## 2024-03-27 NOTE — CASE COMMUNICATION
Thanks so much!    ----- Message -----  From: Mohan Hernandez DO  Sent: 3/27/2024   7:50 AM EDT  To: David De Oliveira PT      No problem   ----- Message -----  From: David De Oliveira PT  Sent: 3/26/2024   1:45 PM EDT  To: Mohan Hernandez DO    Hi Dr. Hernandez,    Could you please fax a prescription for a new custom AFO for left ankle for this patient. They will come to his home since he cannot get out of his house. The one kera t he has is very old and does not fit properly.  DX: CVA with left hemiparesis. [facility] Fax: [phone] and phone is [phone];    Thank you very much,   Ramos De Oliveira, Physical Therapist  [facility]

## 2024-03-28 PROCEDURE — 1090000002 HH PPS REVENUE DEBIT

## 2024-03-28 PROCEDURE — 1090000001 HH PPS REVENUE CREDIT

## 2024-03-29 PROCEDURE — 1090000001 HH PPS REVENUE CREDIT

## 2024-03-29 PROCEDURE — 1090000002 HH PPS REVENUE DEBIT

## 2024-03-30 ENCOUNTER — HOME CARE VISIT (OUTPATIENT)
Dept: HOME HEALTH SERVICES | Facility: HOME HEALTH | Age: 79
End: 2024-03-30
Payer: MEDICARE

## 2024-03-30 PROCEDURE — 1090000001 HH PPS REVENUE CREDIT

## 2024-03-30 PROCEDURE — 1090000002 HH PPS REVENUE DEBIT

## 2024-03-31 PROCEDURE — 1090000002 HH PPS REVENUE DEBIT

## 2024-03-31 PROCEDURE — 1090000001 HH PPS REVENUE CREDIT

## 2024-04-01 ENCOUNTER — HOME CARE VISIT (OUTPATIENT)
Dept: HOME HEALTH SERVICES | Facility: HOME HEALTH | Age: 79
End: 2024-04-01
Payer: MEDICARE

## 2024-04-01 PROCEDURE — 1090000002 HH PPS REVENUE DEBIT

## 2024-04-01 PROCEDURE — G0157 HHC PT ASSISTANT EA 15: HCPCS | Mod: HHH

## 2024-04-01 PROCEDURE — 1090000001 HH PPS REVENUE CREDIT

## 2024-04-01 PROCEDURE — 0023 HH SOC

## 2024-04-02 ENCOUNTER — HOME CARE VISIT (OUTPATIENT)
Dept: HOME HEALTH SERVICES | Facility: HOME HEALTH | Age: 79
End: 2024-04-02
Payer: MEDICARE

## 2024-04-02 VITALS — TEMPERATURE: 98.2 F | OXYGEN SATURATION: 97 % | HEART RATE: 60 BPM

## 2024-04-02 PROCEDURE — 1090000002 HH PPS REVENUE DEBIT

## 2024-04-02 PROCEDURE — 0023 HH SOC

## 2024-04-02 PROCEDURE — G0152 HHCP-SERV OF OT,EA 15 MIN: HCPCS | Mod: HHH

## 2024-04-02 PROCEDURE — 1090000001 HH PPS REVENUE CREDIT

## 2024-04-02 ASSESSMENT — ACTIVITIES OF DAILY LIVING (ADL)
GROOMING ASSESSED: 1
GROOMING_CURRENT_FUNCTION: MODERATE ASSIST
CURRENT_FUNCTION: MAXIMUM ASSIST
PHYSICAL TRANSFERS ASSESSED: 1

## 2024-04-03 ENCOUNTER — HOME CARE VISIT (OUTPATIENT)
Dept: HOME HEALTH SERVICES | Facility: HOME HEALTH | Age: 79
End: 2024-04-03
Payer: MEDICARE

## 2024-04-03 PROCEDURE — G0157 HHC PT ASSISTANT EA 15: HCPCS | Mod: HHH

## 2024-04-03 PROCEDURE — 1090000002 HH PPS REVENUE DEBIT

## 2024-04-03 PROCEDURE — 1090000001 HH PPS REVENUE CREDIT

## 2024-04-04 ENCOUNTER — HOME CARE VISIT (OUTPATIENT)
Dept: HOME HEALTH SERVICES | Facility: HOME HEALTH | Age: 79
End: 2024-04-04
Payer: MEDICARE

## 2024-04-04 VITALS — TEMPERATURE: 97.9 F

## 2024-04-04 PROCEDURE — 1090000001 HH PPS REVENUE CREDIT

## 2024-04-04 PROCEDURE — 1090000002 HH PPS REVENUE DEBIT

## 2024-04-04 PROCEDURE — G0152 HHCP-SERV OF OT,EA 15 MIN: HCPCS | Mod: HHH

## 2024-04-04 ASSESSMENT — ACTIVITIES OF DAILY LIVING (ADL)
AMBULATION ASSISTANCE: 1
CURRENT_FUNCTION: ONE PERSON
AMBULATION ASSISTANCE: ONE PERSON
CURRENT_FUNCTION: CONTACT GUARD ASSIST
AMBULATION ASSISTANCE: CONTACT GUARD ASSIST
PHYSICAL TRANSFERS ASSESSED: 1

## 2024-04-04 ASSESSMENT — ENCOUNTER SYMPTOMS
DENIES PAIN: 1
PERSON REPORTING PAIN: PATIENT
DENIES PAIN: 1

## 2024-04-05 PROCEDURE — 1090000002 HH PPS REVENUE DEBIT

## 2024-04-05 PROCEDURE — 1090000001 HH PPS REVENUE CREDIT

## 2024-04-06 PROCEDURE — 1090000002 HH PPS REVENUE DEBIT

## 2024-04-06 PROCEDURE — 1090000001 HH PPS REVENUE CREDIT

## 2024-04-07 PROCEDURE — 1090000002 HH PPS REVENUE DEBIT

## 2024-04-07 PROCEDURE — 1090000001 HH PPS REVENUE CREDIT

## 2024-04-08 ENCOUNTER — HOME CARE VISIT (OUTPATIENT)
Dept: HOME HEALTH SERVICES | Facility: HOME HEALTH | Age: 79
End: 2024-04-08
Payer: MEDICARE

## 2024-04-08 PROCEDURE — 1090000002 HH PPS REVENUE DEBIT

## 2024-04-08 PROCEDURE — G0157 HHC PT ASSISTANT EA 15: HCPCS | Mod: HHH

## 2024-04-08 PROCEDURE — 1090000001 HH PPS REVENUE CREDIT

## 2024-04-09 ENCOUNTER — HOME CARE VISIT (OUTPATIENT)
Dept: HOME HEALTH SERVICES | Facility: HOME HEALTH | Age: 79
End: 2024-04-09
Payer: MEDICARE

## 2024-04-09 PROCEDURE — 1090000002 HH PPS REVENUE DEBIT

## 2024-04-09 PROCEDURE — G0152 HHCP-SERV OF OT,EA 15 MIN: HCPCS | Mod: HHH

## 2024-04-09 PROCEDURE — 1090000001 HH PPS REVENUE CREDIT

## 2024-04-09 ASSESSMENT — ACTIVITIES OF DAILY LIVING (ADL)
CURRENT_FUNCTION: MINIMUM ASSIST
PHYSICAL TRANSFERS ASSESSED: 1

## 2024-04-09 ASSESSMENT — ENCOUNTER SYMPTOMS
DENIES PAIN: 1
DENIES PAIN: 1
PERSON REPORTING PAIN: PATIENT
PERSON REPORTING PAIN: PATIENT

## 2024-04-10 ENCOUNTER — HOME CARE VISIT (OUTPATIENT)
Dept: HOME HEALTH SERVICES | Facility: HOME HEALTH | Age: 79
End: 2024-04-10
Payer: MEDICARE

## 2024-04-10 PROCEDURE — 1090000001 HH PPS REVENUE CREDIT

## 2024-04-10 PROCEDURE — 1090000002 HH PPS REVENUE DEBIT

## 2024-04-10 PROCEDURE — G0157 HHC PT ASSISTANT EA 15: HCPCS | Mod: CQ,HHH

## 2024-04-11 ENCOUNTER — HOME CARE VISIT (OUTPATIENT)
Dept: HOME HEALTH SERVICES | Facility: HOME HEALTH | Age: 79
End: 2024-04-11
Payer: MEDICARE

## 2024-04-11 VITALS — HEART RATE: 60 BPM | OXYGEN SATURATION: 98 % | TEMPERATURE: 97.4 F

## 2024-04-11 PROCEDURE — 1090000002 HH PPS REVENUE DEBIT

## 2024-04-11 PROCEDURE — 1090000001 HH PPS REVENUE CREDIT

## 2024-04-11 PROCEDURE — G0152 HHCP-SERV OF OT,EA 15 MIN: HCPCS | Mod: HHH

## 2024-04-11 ASSESSMENT — ENCOUNTER SYMPTOMS
PERSON REPORTING PAIN: PATIENT
DENIES PAIN: 1

## 2024-04-11 ASSESSMENT — ACTIVITIES OF DAILY LIVING (ADL)
PHYSICAL TRANSFERS ASSESSED: 1
AMBULATION ASSISTANCE: 1
CURRENT_FUNCTION: CONTACT GUARD ASSIST
AMBULATION ASSISTANCE: CONTACT GUARD ASSIST

## 2024-04-12 PROCEDURE — 1090000002 HH PPS REVENUE DEBIT

## 2024-04-12 PROCEDURE — 1090000001 HH PPS REVENUE CREDIT

## 2024-04-13 PROCEDURE — 1090000001 HH PPS REVENUE CREDIT

## 2024-04-13 PROCEDURE — 1090000002 HH PPS REVENUE DEBIT

## 2024-04-14 PROCEDURE — 1090000002 HH PPS REVENUE DEBIT

## 2024-04-14 PROCEDURE — 1090000001 HH PPS REVENUE CREDIT

## 2024-04-15 ENCOUNTER — HOME CARE VISIT (OUTPATIENT)
Dept: HOME HEALTH SERVICES | Facility: HOME HEALTH | Age: 79
End: 2024-04-15
Payer: MEDICARE

## 2024-04-15 PROCEDURE — 1090000001 HH PPS REVENUE CREDIT

## 2024-04-15 PROCEDURE — 1090000002 HH PPS REVENUE DEBIT

## 2024-04-16 PROCEDURE — 1090000001 HH PPS REVENUE CREDIT

## 2024-04-16 PROCEDURE — 1090000002 HH PPS REVENUE DEBIT

## 2024-04-17 ENCOUNTER — HOME CARE VISIT (OUTPATIENT)
Dept: HOME HEALTH SERVICES | Facility: HOME HEALTH | Age: 79
End: 2024-04-17
Payer: MEDICARE

## 2024-04-17 PROCEDURE — 1090000002 HH PPS REVENUE DEBIT

## 2024-04-17 PROCEDURE — 1090000001 HH PPS REVENUE CREDIT

## 2024-04-18 ENCOUNTER — HOME CARE VISIT (OUTPATIENT)
Dept: HOME HEALTH SERVICES | Facility: HOME HEALTH | Age: 79
End: 2024-04-18
Payer: MEDICARE

## 2024-04-18 PROCEDURE — 1090000001 HH PPS REVENUE CREDIT

## 2024-04-18 PROCEDURE — 1090000002 HH PPS REVENUE DEBIT

## 2024-04-19 PROCEDURE — 1090000002 HH PPS REVENUE DEBIT

## 2024-04-19 PROCEDURE — 1090000001 HH PPS REVENUE CREDIT

## 2024-04-20 PROCEDURE — 1090000002 HH PPS REVENUE DEBIT

## 2024-04-20 PROCEDURE — 1090000001 HH PPS REVENUE CREDIT

## 2024-04-21 PROCEDURE — 1090000001 HH PPS REVENUE CREDIT

## 2024-04-21 PROCEDURE — 1090000002 HH PPS REVENUE DEBIT

## 2024-04-22 ENCOUNTER — HOME CARE VISIT (OUTPATIENT)
Dept: HOME HEALTH SERVICES | Facility: HOME HEALTH | Age: 79
End: 2024-04-22
Payer: MEDICARE

## 2024-04-22 PROCEDURE — 1090000002 HH PPS REVENUE DEBIT

## 2024-04-22 PROCEDURE — G0157 HHC PT ASSISTANT EA 15: HCPCS | Mod: CQ,HHH

## 2024-04-22 PROCEDURE — 1090000001 HH PPS REVENUE CREDIT

## 2024-04-22 ASSESSMENT — ENCOUNTER SYMPTOMS: DENIES PAIN: 1

## 2024-04-23 ENCOUNTER — HOME CARE VISIT (OUTPATIENT)
Dept: HOME HEALTH SERVICES | Facility: HOME HEALTH | Age: 79
End: 2024-04-23
Payer: MEDICARE

## 2024-04-23 VITALS — TEMPERATURE: 97.1 F

## 2024-04-23 PROCEDURE — 1090000002 HH PPS REVENUE DEBIT

## 2024-04-23 PROCEDURE — G0152 HHCP-SERV OF OT,EA 15 MIN: HCPCS | Mod: HHH

## 2024-04-23 PROCEDURE — 1090000001 HH PPS REVENUE CREDIT

## 2024-04-23 ASSESSMENT — ENCOUNTER SYMPTOMS
PERSON REPORTING PAIN: PATIENT
DENIES PAIN: 1

## 2024-04-24 ENCOUNTER — HOME CARE VISIT (OUTPATIENT)
Dept: HOME HEALTH SERVICES | Facility: HOME HEALTH | Age: 79
End: 2024-04-24
Payer: MEDICARE

## 2024-04-24 VITALS — RESPIRATION RATE: 14 BRPM

## 2024-04-24 PROCEDURE — 1090000001 HH PPS REVENUE CREDIT

## 2024-04-24 PROCEDURE — G0151 HHCP-SERV OF PT,EA 15 MIN: HCPCS | Mod: HHH

## 2024-04-24 PROCEDURE — 1090000002 HH PPS REVENUE DEBIT

## 2024-04-24 SDOH — HEALTH STABILITY: PHYSICAL HEALTH: EXERCISE ACTIVITIES SETS: 2

## 2024-04-24 SDOH — HEALTH STABILITY: PHYSICAL HEALTH: EXERCISE ACTIVITY: B LES

## 2024-04-24 SDOH — HEALTH STABILITY: PHYSICAL HEALTH: PHYSICAL EXERCISE: SIT, RECLINED

## 2024-04-24 SDOH — HEALTH STABILITY: PHYSICAL HEALTH: PHYSICAL EXERCISE: 10

## 2024-04-24 ASSESSMENT — ENCOUNTER SYMPTOMS
LIMITED RANGE OF MOTION: 1
PERSON REPORTING PAIN: PATIENT
LOWEST PAIN SEVERITY IN PAST 24 HOURS: 0/10
PAIN: 1
HIGHEST PAIN SEVERITY IN PAST 24 HOURS: 3/10
PAIN SEVERITY GOAL: 0/10
SUBJECTIVE PAIN PROGRESSION: WAXING AND WANING
PAIN LOCATION - PAIN QUALITY: CONTRACTURE
PAIN LOCATION: LEFT HAND
MUSCLE WEAKNESS: 1

## 2024-04-24 ASSESSMENT — ACTIVITIES OF DAILY LIVING (ADL)
AMBULATION ASSISTANCE: 1
CURRENT_FUNCTION: MAXIMUM ASSIST
AMBULATION ASSISTANCE ON FLAT SURFACES: 1
ENTERING_EXITING_HOME: MAXIMUM ASSIST
AMBULATION ASSISTANCE: MAXIMUM ASSIST
OASIS_M1830: 04
AMBULATION ASSISTANCE: ONE PERSON
PHYSICAL TRANSFERS ASSESSED: 1
CURRENT_FUNCTION: ONE PERSON

## 2024-04-25 ENCOUNTER — HOME CARE VISIT (OUTPATIENT)
Dept: HOME HEALTH SERVICES | Facility: HOME HEALTH | Age: 79
End: 2024-04-25
Payer: MEDICARE

## 2024-04-25 PROCEDURE — G0152 HHCP-SERV OF OT,EA 15 MIN: HCPCS | Mod: HHH

## 2024-04-25 PROCEDURE — 1090000001 HH PPS REVENUE CREDIT

## 2024-04-25 PROCEDURE — 1090000002 HH PPS REVENUE DEBIT

## 2024-04-25 ASSESSMENT — ENCOUNTER SYMPTOMS
DENIES PAIN: 1
PERSON REPORTING PAIN: PATIENT

## 2024-04-26 PROCEDURE — 1090000002 HH PPS REVENUE DEBIT

## 2024-04-26 PROCEDURE — 1090000001 HH PPS REVENUE CREDIT

## 2024-04-27 PROCEDURE — 1090000001 HH PPS REVENUE CREDIT

## 2024-04-27 PROCEDURE — 1090000002 HH PPS REVENUE DEBIT

## 2024-04-28 PROCEDURE — 1090000002 HH PPS REVENUE DEBIT

## 2024-04-28 PROCEDURE — 1090000001 HH PPS REVENUE CREDIT

## 2024-04-29 ENCOUNTER — HOME CARE VISIT (OUTPATIENT)
Dept: HOME HEALTH SERVICES | Facility: HOME HEALTH | Age: 79
End: 2024-04-29
Payer: MEDICARE

## 2024-04-29 PROCEDURE — 1090000001 HH PPS REVENUE CREDIT

## 2024-04-29 PROCEDURE — G0157 HHC PT ASSISTANT EA 15: HCPCS | Mod: CQ,HHH

## 2024-04-29 PROCEDURE — 400014 HH F/U

## 2024-04-29 PROCEDURE — 1090000002 HH PPS REVENUE DEBIT

## 2024-04-29 ASSESSMENT — ENCOUNTER SYMPTOMS
PERSON REPORTING PAIN: PATIENT
DENIES PAIN: 1

## 2024-04-30 ENCOUNTER — HOME CARE VISIT (OUTPATIENT)
Dept: HOME HEALTH SERVICES | Facility: HOME HEALTH | Age: 79
End: 2024-04-30
Payer: MEDICARE

## 2024-04-30 VITALS — OXYGEN SATURATION: 99 % | HEART RATE: 60 BPM | TEMPERATURE: 98.1 F

## 2024-04-30 PROCEDURE — G0152 HHCP-SERV OF OT,EA 15 MIN: HCPCS | Mod: HHH

## 2024-04-30 PROCEDURE — 1090000002 HH PPS REVENUE DEBIT

## 2024-04-30 PROCEDURE — 1090000001 HH PPS REVENUE CREDIT

## 2024-04-30 PROCEDURE — G0179 MD RECERTIFICATION HHA PT: HCPCS | Performed by: FAMILY MEDICINE

## 2024-04-30 ASSESSMENT — ENCOUNTER SYMPTOMS
DENIES PAIN: 1
PERSON REPORTING PAIN: PATIENT

## 2024-05-01 ENCOUNTER — HOME CARE VISIT (OUTPATIENT)
Dept: HOME HEALTH SERVICES | Facility: HOME HEALTH | Age: 79
End: 2024-05-01
Payer: MEDICARE

## 2024-05-01 PROCEDURE — G0157 HHC PT ASSISTANT EA 15: HCPCS | Mod: CQ,HHH

## 2024-05-01 PROCEDURE — 1090000001 HH PPS REVENUE CREDIT

## 2024-05-01 PROCEDURE — 1090000002 HH PPS REVENUE DEBIT

## 2024-05-02 ENCOUNTER — HOME CARE VISIT (OUTPATIENT)
Dept: HOME HEALTH SERVICES | Facility: HOME HEALTH | Age: 79
End: 2024-05-02
Payer: MEDICARE

## 2024-05-02 VITALS — TEMPERATURE: 96.6 F

## 2024-05-02 PROCEDURE — G0152 HHCP-SERV OF OT,EA 15 MIN: HCPCS | Mod: HHH

## 2024-05-02 PROCEDURE — 1090000002 HH PPS REVENUE DEBIT

## 2024-05-02 PROCEDURE — 1090000001 HH PPS REVENUE CREDIT

## 2024-05-02 ASSESSMENT — ENCOUNTER SYMPTOMS
DENIES PAIN: 1
PERSON REPORTING PAIN: PATIENT

## 2024-05-03 PROCEDURE — 1090000002 HH PPS REVENUE DEBIT

## 2024-05-03 PROCEDURE — 1090000001 HH PPS REVENUE CREDIT

## 2024-05-04 PROCEDURE — 1090000002 HH PPS REVENUE DEBIT

## 2024-05-04 PROCEDURE — 1090000001 HH PPS REVENUE CREDIT

## 2024-05-05 PROCEDURE — 1090000002 HH PPS REVENUE DEBIT

## 2024-05-05 PROCEDURE — 1090000001 HH PPS REVENUE CREDIT

## 2024-05-06 ENCOUNTER — HOME CARE VISIT (OUTPATIENT)
Dept: HOME HEALTH SERVICES | Facility: HOME HEALTH | Age: 79
End: 2024-05-06
Payer: MEDICARE

## 2024-05-06 PROCEDURE — G0157 HHC PT ASSISTANT EA 15: HCPCS | Mod: CQ,HHH

## 2024-05-06 PROCEDURE — 1090000002 HH PPS REVENUE DEBIT

## 2024-05-06 PROCEDURE — 1090000001 HH PPS REVENUE CREDIT

## 2024-05-07 ENCOUNTER — HOME CARE VISIT (OUTPATIENT)
Dept: HOME HEALTH SERVICES | Facility: HOME HEALTH | Age: 79
End: 2024-05-07
Payer: MEDICARE

## 2024-05-07 VITALS — OXYGEN SATURATION: 95 % | HEART RATE: 59 BPM | TEMPERATURE: 97.9 F

## 2024-05-07 PROCEDURE — 1090000002 HH PPS REVENUE DEBIT

## 2024-05-07 PROCEDURE — G0152 HHCP-SERV OF OT,EA 15 MIN: HCPCS | Mod: HHH

## 2024-05-07 PROCEDURE — 1090000001 HH PPS REVENUE CREDIT

## 2024-05-07 ASSESSMENT — ENCOUNTER SYMPTOMS
DENIES PAIN: 1
DENIES PAIN: 1
PERSON REPORTING PAIN: PATIENT

## 2024-05-07 ASSESSMENT — ACTIVITIES OF DAILY LIVING (ADL)
CURRENT_FUNCTION: STAND BY ASSIST
DRESSING_UB_CURRENT_FUNCTION: INDEPENDENT
PHYSICAL TRANSFERS ASSESSED: 1

## 2024-05-08 ENCOUNTER — HOME CARE VISIT (OUTPATIENT)
Dept: HOME HEALTH SERVICES | Facility: HOME HEALTH | Age: 79
End: 2024-05-08
Payer: MEDICARE

## 2024-05-08 PROCEDURE — 1090000001 HH PPS REVENUE CREDIT

## 2024-05-08 PROCEDURE — 1090000002 HH PPS REVENUE DEBIT

## 2024-05-08 PROCEDURE — G0157 HHC PT ASSISTANT EA 15: HCPCS | Mod: CQ,HHH

## 2024-05-09 PROCEDURE — 1090000002 HH PPS REVENUE DEBIT

## 2024-05-09 PROCEDURE — 1090000001 HH PPS REVENUE CREDIT

## 2024-05-09 ASSESSMENT — ENCOUNTER SYMPTOMS: DENIES PAIN: 1

## 2024-05-10 ENCOUNTER — HOME CARE VISIT (OUTPATIENT)
Dept: HOME HEALTH SERVICES | Facility: HOME HEALTH | Age: 79
End: 2024-05-10
Payer: MEDICARE

## 2024-05-10 VITALS — TEMPERATURE: 98.1 F

## 2024-05-10 PROCEDURE — 1090000001 HH PPS REVENUE CREDIT

## 2024-05-10 PROCEDURE — G0152 HHCP-SERV OF OT,EA 15 MIN: HCPCS | Mod: HHH

## 2024-05-10 PROCEDURE — 1090000002 HH PPS REVENUE DEBIT

## 2024-05-10 ASSESSMENT — ENCOUNTER SYMPTOMS
DENIES PAIN: 1
PERSON REPORTING PAIN: PATIENT

## 2024-05-11 PROCEDURE — 1090000002 HH PPS REVENUE DEBIT

## 2024-05-11 PROCEDURE — 1090000001 HH PPS REVENUE CREDIT

## 2024-05-12 PROCEDURE — 1090000002 HH PPS REVENUE DEBIT

## 2024-05-12 PROCEDURE — 1090000001 HH PPS REVENUE CREDIT

## 2024-05-13 ENCOUNTER — HOME CARE VISIT (OUTPATIENT)
Dept: HOME HEALTH SERVICES | Facility: HOME HEALTH | Age: 79
End: 2024-05-13
Payer: MEDICARE

## 2024-05-13 PROCEDURE — 1090000001 HH PPS REVENUE CREDIT

## 2024-05-13 PROCEDURE — G0157 HHC PT ASSISTANT EA 15: HCPCS | Mod: CQ,HHH

## 2024-05-13 PROCEDURE — 1090000002 HH PPS REVENUE DEBIT

## 2024-05-13 ASSESSMENT — ENCOUNTER SYMPTOMS
PERSON REPORTING PAIN: PATIENT
DENIES PAIN: 1

## 2024-05-14 ENCOUNTER — HOME CARE VISIT (OUTPATIENT)
Dept: HOME HEALTH SERVICES | Facility: HOME HEALTH | Age: 79
End: 2024-05-14
Payer: MEDICARE

## 2024-05-14 VITALS — TEMPERATURE: 98.1 F

## 2024-05-14 PROCEDURE — 1090000002 HH PPS REVENUE DEBIT

## 2024-05-14 PROCEDURE — G0152 HHCP-SERV OF OT,EA 15 MIN: HCPCS | Mod: HHH

## 2024-05-14 PROCEDURE — 1090000001 HH PPS REVENUE CREDIT

## 2024-05-14 ASSESSMENT — ACTIVITIES OF DAILY LIVING (ADL)
PHYSICAL TRANSFERS ASSESSED: 1
CURRENT_FUNCTION: MINIMUM ASSIST
CURRENT_FUNCTION: CONTACT GUARD ASSIST

## 2024-05-14 ASSESSMENT — ENCOUNTER SYMPTOMS
PERSON REPORTING PAIN: PATIENT
DENIES PAIN: 1

## 2024-05-15 ENCOUNTER — HOME CARE VISIT (OUTPATIENT)
Dept: HOME HEALTH SERVICES | Facility: HOME HEALTH | Age: 79
End: 2024-05-15
Payer: MEDICARE

## 2024-05-15 PROCEDURE — 1090000001 HH PPS REVENUE CREDIT

## 2024-05-15 PROCEDURE — 1090000002 HH PPS REVENUE DEBIT

## 2024-05-15 PROCEDURE — G0157 HHC PT ASSISTANT EA 15: HCPCS | Mod: CQ,HHH

## 2024-05-15 ASSESSMENT — ENCOUNTER SYMPTOMS: DENIES PAIN: 1

## 2024-05-16 ENCOUNTER — HOME CARE VISIT (OUTPATIENT)
Dept: HOME HEALTH SERVICES | Facility: HOME HEALTH | Age: 79
End: 2024-05-16
Payer: MEDICARE

## 2024-05-16 VITALS — TEMPERATURE: 97.5 F

## 2024-05-16 PROCEDURE — 1090000002 HH PPS REVENUE DEBIT

## 2024-05-16 PROCEDURE — 1090000001 HH PPS REVENUE CREDIT

## 2024-05-16 PROCEDURE — G0152 HHCP-SERV OF OT,EA 15 MIN: HCPCS | Mod: HHH

## 2024-05-16 ASSESSMENT — ACTIVITIES OF DAILY LIVING (ADL)
PHYSICAL TRANSFERS ASSESSED: 1
GROOMING_CURRENT_FUNCTION: MINIMUM ASSIST
CURRENT_FUNCTION: MINIMUM ASSIST
GROOMING ASSESSED: 1

## 2024-05-16 ASSESSMENT — ENCOUNTER SYMPTOMS
PERSON REPORTING PAIN: PATIENT
DENIES PAIN: 1

## 2024-05-17 PROCEDURE — 1090000002 HH PPS REVENUE DEBIT

## 2024-05-17 PROCEDURE — 1090000001 HH PPS REVENUE CREDIT

## 2024-05-18 PROCEDURE — 1090000002 HH PPS REVENUE DEBIT

## 2024-05-18 PROCEDURE — 1090000001 HH PPS REVENUE CREDIT

## 2024-05-19 PROCEDURE — 1090000002 HH PPS REVENUE DEBIT

## 2024-05-19 PROCEDURE — 1090000001 HH PPS REVENUE CREDIT

## 2024-05-20 ENCOUNTER — HOME CARE VISIT (OUTPATIENT)
Dept: HOME HEALTH SERVICES | Facility: HOME HEALTH | Age: 79
End: 2024-05-20
Payer: MEDICARE

## 2024-05-20 VITALS — HEART RATE: 60 BPM | TEMPERATURE: 97.9 F

## 2024-05-20 PROCEDURE — 1090000002 HH PPS REVENUE DEBIT

## 2024-05-20 PROCEDURE — 1090000001 HH PPS REVENUE CREDIT

## 2024-05-20 PROCEDURE — G0152 HHCP-SERV OF OT,EA 15 MIN: HCPCS | Mod: HHH

## 2024-05-20 ASSESSMENT — ACTIVITIES OF DAILY LIVING (ADL)
CURRENT_FUNCTION: CONTACT GUARD ASSIST
AMBULATION ASSISTANCE: 1
PHYSICAL TRANSFERS ASSESSED: 1
AMBULATION ASSISTANCE: CONTACT GUARD ASSIST

## 2024-05-20 ASSESSMENT — ENCOUNTER SYMPTOMS
DENIES PAIN: 1
PERSON REPORTING PAIN: PATIENT

## 2024-05-21 PROCEDURE — 1090000002 HH PPS REVENUE DEBIT

## 2024-05-21 PROCEDURE — 1090000001 HH PPS REVENUE CREDIT

## 2024-05-22 ENCOUNTER — HOME CARE VISIT (OUTPATIENT)
Dept: HOME HEALTH SERVICES | Facility: HOME HEALTH | Age: 79
End: 2024-05-22
Payer: MEDICARE

## 2024-05-22 VITALS — RESPIRATION RATE: 14 BRPM

## 2024-05-22 PROCEDURE — 1090000001 HH PPS REVENUE CREDIT

## 2024-05-22 PROCEDURE — G0151 HHCP-SERV OF PT,EA 15 MIN: HCPCS | Mod: HHH

## 2024-05-22 PROCEDURE — 1090000002 HH PPS REVENUE DEBIT

## 2024-05-22 SDOH — HEALTH STABILITY: PHYSICAL HEALTH: PHYSICAL EXERCISE: 15

## 2024-05-22 SDOH — HEALTH STABILITY: PHYSICAL HEALTH: EXERCISE ACTIVITY: B LES AND TRUNK

## 2024-05-22 SDOH — HEALTH STABILITY: PHYSICAL HEALTH: EXERCISE ACTIVITIES SETS: 1

## 2024-05-22 SDOH — HEALTH STABILITY: PHYSICAL HEALTH: PHYSICAL EXERCISE: SIT, SUPINE

## 2024-05-22 SDOH — HEALTH STABILITY: PHYSICAL HEALTH: EXERCISE TYPE: B LE AND TRUNK

## 2024-05-22 ASSESSMENT — ACTIVITIES OF DAILY LIVING (ADL)
AMBULATION ASSISTANCE: MODERATE ASSIST
AMBULATION ASSISTANCE: MAXIMUM ASSIST
CURRENT_FUNCTION: ONE PERSON
CURRENT_FUNCTION: MODERATE ASSIST
AMBULATION ASSISTANCE ON FLAT SURFACES: 1
AMBULATION ASSISTANCE: 1
CURRENT_FUNCTION: MAXIMUM ASSIST
PHYSICAL TRANSFERS ASSESSED: 1
AMBULATION ASSISTANCE: ONE PERSON

## 2024-05-22 ASSESSMENT — ENCOUNTER SYMPTOMS
LIMITED RANGE OF MOTION: 1
MUSCLE WEAKNESS: 1
DENIES PAIN: 1

## 2024-05-23 ENCOUNTER — HOME CARE VISIT (OUTPATIENT)
Dept: HOME HEALTH SERVICES | Facility: HOME HEALTH | Age: 79
End: 2024-05-23
Payer: MEDICARE

## 2024-05-23 VITALS — OXYGEN SATURATION: 96 % | TEMPERATURE: 98.6 F | HEART RATE: 60 BPM

## 2024-05-23 PROCEDURE — 1090000001 HH PPS REVENUE CREDIT

## 2024-05-23 PROCEDURE — 1090000002 HH PPS REVENUE DEBIT

## 2024-05-23 PROCEDURE — G0152 HHCP-SERV OF OT,EA 15 MIN: HCPCS | Mod: HHH

## 2024-05-23 ASSESSMENT — ACTIVITIES OF DAILY LIVING (ADL)
TOILETING: MODERATE ASSIST
CURRENT_FUNCTION: CONTACT GUARD ASSIST
TOILETING: 1
PHYSICAL TRANSFERS ASSESSED: 1

## 2024-05-23 ASSESSMENT — ENCOUNTER SYMPTOMS
PERSON REPORTING PAIN: PATIENT
DENIES PAIN: 1

## 2024-05-24 ENCOUNTER — HOME CARE VISIT (OUTPATIENT)
Dept: HOME HEALTH SERVICES | Facility: HOME HEALTH | Age: 79
End: 2024-05-24
Payer: MEDICARE

## 2024-05-24 PROCEDURE — 1090000002 HH PPS REVENUE DEBIT

## 2024-05-24 PROCEDURE — 1090000001 HH PPS REVENUE CREDIT

## 2024-05-25 PROCEDURE — 1090000002 HH PPS REVENUE DEBIT

## 2024-05-25 PROCEDURE — 1090000001 HH PPS REVENUE CREDIT

## 2024-05-26 PROCEDURE — 1090000003 HH PPS REVENUE ADJ

## 2024-05-26 PROCEDURE — 1090000002 HH PPS REVENUE DEBIT

## 2024-05-26 PROCEDURE — 1090000001 HH PPS REVENUE CREDIT

## 2024-05-27 ENCOUNTER — HOME CARE VISIT (OUTPATIENT)
Dept: HOME HEALTH SERVICES | Facility: HOME HEALTH | Age: 79
End: 2024-05-27
Payer: MEDICARE

## 2024-05-27 VITALS — TEMPERATURE: 97.5 F

## 2024-05-27 PROCEDURE — 400014 HH F/U

## 2024-05-27 PROCEDURE — 1090000002 HH PPS REVENUE DEBIT

## 2024-05-27 PROCEDURE — 1090000001 HH PPS REVENUE CREDIT

## 2024-05-27 PROCEDURE — G0152 HHCP-SERV OF OT,EA 15 MIN: HCPCS | Mod: HHH

## 2024-05-27 ASSESSMENT — ACTIVITIES OF DAILY LIVING (ADL)
TOILETING: 1
CURRENT_FUNCTION: CONTACT GUARD ASSIST
PHYSICAL TRANSFERS ASSESSED: 1

## 2024-05-27 ASSESSMENT — ENCOUNTER SYMPTOMS
PERSON REPORTING PAIN: PATIENT
DENIES PAIN: 1

## 2024-05-28 PROCEDURE — 1090000001 HH PPS REVENUE CREDIT

## 2024-05-28 PROCEDURE — 1090000002 HH PPS REVENUE DEBIT

## 2024-05-29 ENCOUNTER — HOME CARE VISIT (OUTPATIENT)
Dept: HOME HEALTH SERVICES | Facility: HOME HEALTH | Age: 79
End: 2024-05-29
Payer: MEDICARE

## 2024-05-29 PROCEDURE — 1090000002 HH PPS REVENUE DEBIT

## 2024-05-29 PROCEDURE — 1090000001 HH PPS REVENUE CREDIT

## 2024-05-29 PROCEDURE — G0157 HHC PT ASSISTANT EA 15: HCPCS | Mod: CQ,HHH

## 2024-05-29 SDOH — ECONOMIC STABILITY: HOUSING INSECURITY: HOME SAFETY: PT REMAINS HOMEBOUND.

## 2024-05-29 ASSESSMENT — ENCOUNTER SYMPTOMS
DENIES PAIN: 1
PERSON REPORTING PAIN: PATIENT

## 2024-05-30 ENCOUNTER — HOME CARE VISIT (OUTPATIENT)
Dept: HOME HEALTH SERVICES | Facility: HOME HEALTH | Age: 79
End: 2024-05-30
Payer: MEDICARE

## 2024-05-30 VITALS — TEMPERATURE: 97.2 F

## 2024-05-30 PROCEDURE — G0152 HHCP-SERV OF OT,EA 15 MIN: HCPCS | Mod: HHH

## 2024-05-30 PROCEDURE — 1090000002 HH PPS REVENUE DEBIT

## 2024-05-30 PROCEDURE — 1090000001 HH PPS REVENUE CREDIT

## 2024-05-30 ASSESSMENT — ENCOUNTER SYMPTOMS
PERSON REPORTING PAIN: PATIENT
DENIES PAIN: 1

## 2024-05-31 ENCOUNTER — HOME CARE VISIT (OUTPATIENT)
Dept: HOME HEALTH SERVICES | Facility: HOME HEALTH | Age: 79
End: 2024-05-31
Payer: MEDICARE

## 2024-05-31 PROCEDURE — 1090000001 HH PPS REVENUE CREDIT

## 2024-05-31 PROCEDURE — G0157 HHC PT ASSISTANT EA 15: HCPCS | Mod: CQ,HHH

## 2024-05-31 PROCEDURE — 1090000002 HH PPS REVENUE DEBIT

## 2024-05-31 ASSESSMENT — ENCOUNTER SYMPTOMS
DENIES PAIN: 1
PERSON REPORTING PAIN: PATIENT

## 2024-05-31 ASSESSMENT — ACTIVITIES OF DAILY LIVING (ADL): AMBULATION ASSISTANCE ON FLAT SURFACES: 1

## 2024-06-01 PROCEDURE — 1090000002 HH PPS REVENUE DEBIT

## 2024-06-01 PROCEDURE — 1090000001 HH PPS REVENUE CREDIT

## 2024-06-02 PROCEDURE — 1090000001 HH PPS REVENUE CREDIT

## 2024-06-02 PROCEDURE — 1090000002 HH PPS REVENUE DEBIT

## 2024-06-03 ENCOUNTER — HOME CARE VISIT (OUTPATIENT)
Dept: HOME HEALTH SERVICES | Facility: HOME HEALTH | Age: 79
End: 2024-06-03
Payer: MEDICARE

## 2024-06-03 VITALS — OXYGEN SATURATION: 98 % | HEART RATE: 60 BPM | TEMPERATURE: 97.5 F

## 2024-06-03 PROCEDURE — 1090000002 HH PPS REVENUE DEBIT

## 2024-06-03 PROCEDURE — G0152 HHCP-SERV OF OT,EA 15 MIN: HCPCS | Mod: HHH

## 2024-06-03 PROCEDURE — 1090000001 HH PPS REVENUE CREDIT

## 2024-06-04 ENCOUNTER — HOME CARE VISIT (OUTPATIENT)
Dept: HOME HEALTH SERVICES | Facility: HOME HEALTH | Age: 79
End: 2024-06-04
Payer: MEDICARE

## 2024-06-04 PROCEDURE — 1090000001 HH PPS REVENUE CREDIT

## 2024-06-04 PROCEDURE — 1090000002 HH PPS REVENUE DEBIT

## 2024-06-04 PROCEDURE — G0157 HHC PT ASSISTANT EA 15: HCPCS | Mod: CQ,HHH

## 2024-06-05 ENCOUNTER — HOME CARE VISIT (OUTPATIENT)
Dept: HOME HEALTH SERVICES | Facility: HOME HEALTH | Age: 79
End: 2024-06-05
Payer: MEDICARE

## 2024-06-05 VITALS — OXYGEN SATURATION: 96 % | HEART RATE: 59 BPM | TEMPERATURE: 97.3 F

## 2024-06-05 PROCEDURE — 1090000001 HH PPS REVENUE CREDIT

## 2024-06-05 PROCEDURE — G0152 HHCP-SERV OF OT,EA 15 MIN: HCPCS | Mod: HHH

## 2024-06-05 PROCEDURE — 1090000002 HH PPS REVENUE DEBIT

## 2024-06-05 ASSESSMENT — ENCOUNTER SYMPTOMS
PERSON REPORTING PAIN: PATIENT
DENIES PAIN: 1
PERSON REPORTING PAIN: PATIENT
DENIES PAIN: 1

## 2024-06-05 ASSESSMENT — ACTIVITIES OF DAILY LIVING (ADL)
DRESSING_UB_CURRENT_FUNCTION: SUPERVISION
PHYSICAL TRANSFERS ASSESSED: 1
CURRENT_FUNCTION: SUPERVISION
AMBULATION ASSISTANCE ON FLAT SURFACES: 1
AMBULATION_DISTANCE/DURATION_TOLERATED: 40

## 2024-06-06 ENCOUNTER — HOME CARE VISIT (OUTPATIENT)
Dept: HOME HEALTH SERVICES | Facility: HOME HEALTH | Age: 79
End: 2024-06-06
Payer: MEDICARE

## 2024-06-06 PROCEDURE — 1090000001 HH PPS REVENUE CREDIT

## 2024-06-06 PROCEDURE — 1090000002 HH PPS REVENUE DEBIT

## 2024-06-06 PROCEDURE — G0157 HHC PT ASSISTANT EA 15: HCPCS | Mod: CQ,HHH

## 2024-06-07 PROCEDURE — 1090000002 HH PPS REVENUE DEBIT

## 2024-06-07 PROCEDURE — 1090000001 HH PPS REVENUE CREDIT

## 2024-06-07 SDOH — HEALTH STABILITY: PHYSICAL HEALTH: EXERCISE TYPE: ROM, STRENGTHENING

## 2024-06-07 SDOH — HEALTH STABILITY: PHYSICAL HEALTH
EXERCISE COMMENTS: COMPLETED AND INSTRUCTED IN HEP SITTING ANKLE PUMPS, LAQ, MARCHING, HIP ABDUCTION, ADD 2 X 10. WRITTEN PROGRAM GIVEN

## 2024-06-07 ASSESSMENT — ACTIVITIES OF DAILY LIVING (ADL)
AMBULATION_DISTANCE/DURATION_TOLERATED: 40
AMBULATION ASSISTANCE ON FLAT SURFACES: 1

## 2024-06-07 ASSESSMENT — ENCOUNTER SYMPTOMS
DENIES PAIN: 1
PERSON REPORTING PAIN: PATIENT

## 2024-06-08 PROCEDURE — 1090000002 HH PPS REVENUE DEBIT

## 2024-06-08 PROCEDURE — 1090000001 HH PPS REVENUE CREDIT

## 2024-06-09 PROCEDURE — 1090000001 HH PPS REVENUE CREDIT

## 2024-06-09 PROCEDURE — 1090000002 HH PPS REVENUE DEBIT

## 2024-06-10 ENCOUNTER — HOME CARE VISIT (OUTPATIENT)
Dept: HOME HEALTH SERVICES | Facility: HOME HEALTH | Age: 79
End: 2024-06-10
Payer: MEDICARE

## 2024-06-10 PROCEDURE — G0157 HHC PT ASSISTANT EA 15: HCPCS | Mod: CQ,HHH

## 2024-06-10 PROCEDURE — 1090000001 HH PPS REVENUE CREDIT

## 2024-06-10 PROCEDURE — 1090000002 HH PPS REVENUE DEBIT

## 2024-06-11 ENCOUNTER — HOME CARE VISIT (OUTPATIENT)
Dept: HOME HEALTH SERVICES | Facility: HOME HEALTH | Age: 79
End: 2024-06-11
Payer: MEDICARE

## 2024-06-11 VITALS — TEMPERATURE: 96.4 F

## 2024-06-11 PROCEDURE — G0152 HHCP-SERV OF OT,EA 15 MIN: HCPCS | Mod: HHH

## 2024-06-11 ASSESSMENT — ENCOUNTER SYMPTOMS
PERSON REPORTING PAIN: PATIENT
DENIES PAIN: 1
DENIES PAIN: 1

## 2024-06-11 ASSESSMENT — ACTIVITIES OF DAILY LIVING (ADL)
PHYSICAL TRANSFERS ASSESSED: 1
CURRENT_FUNCTION: CONTACT GUARD ASSIST
AMBULATION ASSISTANCE ON FLAT SURFACES: 1
GROOMING_CURRENT_FUNCTION: MINIMUM ASSIST
GROOMING ASSESSED: 1
AMBULATION_DISTANCE/DURATION_TOLERATED: 40

## 2024-06-12 ENCOUNTER — HOME CARE VISIT (OUTPATIENT)
Dept: HOME HEALTH SERVICES | Facility: HOME HEALTH | Age: 79
End: 2024-06-12
Payer: MEDICARE

## 2024-06-12 PROCEDURE — G0157 HHC PT ASSISTANT EA 15: HCPCS | Mod: CQ,HHH

## 2024-06-13 ASSESSMENT — ENCOUNTER SYMPTOMS
DENIES PAIN: 1
PERSON REPORTING PAIN: PATIENT

## 2024-06-15 ENCOUNTER — HOME CARE VISIT (OUTPATIENT)
Dept: HOME HEALTH SERVICES | Facility: HOME HEALTH | Age: 79
End: 2024-06-15
Payer: MEDICARE

## 2024-06-15 VITALS — HEART RATE: 60 BPM | TEMPERATURE: 97.8 F | OXYGEN SATURATION: 100 %

## 2024-06-15 PROCEDURE — G0152 HHCP-SERV OF OT,EA 15 MIN: HCPCS | Mod: HHH

## 2024-06-15 ASSESSMENT — ENCOUNTER SYMPTOMS
DENIES PAIN: 1
PERSON REPORTING PAIN: PATIENT

## 2024-06-17 ENCOUNTER — HOME CARE VISIT (OUTPATIENT)
Dept: HOME HEALTH SERVICES | Facility: HOME HEALTH | Age: 79
End: 2024-06-17
Payer: MEDICARE

## 2024-06-17 PROCEDURE — G0157 HHC PT ASSISTANT EA 15: HCPCS | Mod: CQ,HHH

## 2024-06-18 ENCOUNTER — HOME CARE VISIT (OUTPATIENT)
Dept: HOME HEALTH SERVICES | Facility: HOME HEALTH | Age: 79
End: 2024-06-18
Payer: MEDICARE

## 2024-06-18 VITALS — TEMPERATURE: 98.2 F

## 2024-06-18 PROCEDURE — G0152 HHCP-SERV OF OT,EA 15 MIN: HCPCS | Mod: HHH

## 2024-06-18 ASSESSMENT — ACTIVITIES OF DAILY LIVING (ADL)
DRESSING_UB_CURRENT_FUNCTION: SUPERVISION
DRESSING_LB_CURRENT_FUNCTION: MINIMUM ASSIST

## 2024-06-18 ASSESSMENT — ENCOUNTER SYMPTOMS
PERSON REPORTING PAIN: PATIENT
DENIES PAIN: 1

## 2024-06-20 ENCOUNTER — HOME CARE VISIT (OUTPATIENT)
Dept: HOME HEALTH SERVICES | Facility: HOME HEALTH | Age: 79
End: 2024-06-20
Payer: MEDICARE

## 2024-06-20 SDOH — HEALTH STABILITY: PHYSICAL HEALTH: PHYSICAL EXERCISE: SIT, RECLINED, STANDING

## 2024-06-20 SDOH — HEALTH STABILITY: PHYSICAL HEALTH: EXERCISE ACTIVITY: LE, TRUNK

## 2024-06-20 SDOH — HEALTH STABILITY: PHYSICAL HEALTH: EXERCISE ACTIVITIES SETS: 2

## 2024-06-20 SDOH — HEALTH STABILITY: PHYSICAL HEALTH: PHYSICAL EXERCISE: 5

## 2024-06-20 ASSESSMENT — ACTIVITIES OF DAILY LIVING (ADL)
CURRENT_FUNCTION: ONE PERSON
CURRENT_FUNCTION: MODERATE ASSIST
AMBULATION ASSISTANCE: 1
AMBULATION ASSISTANCE: ONE PERSON
PHYSICAL TRANSFERS ASSESSED: 1
AMBULATION ASSISTANCE ON FLAT SURFACES: 1
AMBULATION ASSISTANCE: MODERATE ASSIST

## 2024-06-20 ASSESSMENT — ENCOUNTER SYMPTOMS
LIMITED RANGE OF MOTION: 1
PAIN: 1
PAIN LOCATION - PAIN QUALITY: OA
HIGHEST PAIN SEVERITY IN PAST 24 HOURS: 3/10
MUSCLE WEAKNESS: 1
PERSON REPORTING PAIN: CAREGIVER
SUBJECTIVE PAIN PROGRESSION: WAXING AND WANING
LOWEST PAIN SEVERITY IN PAST 24 HOURS: 0/10
PAIN SEVERITY GOAL: 0/10
PAIN LOCATION: GENERALIZED

## 2024-06-21 ENCOUNTER — HOME CARE VISIT (OUTPATIENT)
Dept: HOME HEALTH SERVICES | Facility: HOME HEALTH | Age: 79
End: 2024-06-21
Payer: MEDICARE

## 2024-06-21 VITALS — HEART RATE: 54 BPM | TEMPERATURE: 96.8 F | OXYGEN SATURATION: 96 %

## 2024-06-21 PROCEDURE — G0152 HHCP-SERV OF OT,EA 15 MIN: HCPCS | Mod: HHH

## 2024-06-21 ASSESSMENT — ENCOUNTER SYMPTOMS
PERSON REPORTING PAIN: PATIENT
DENIES PAIN: 1

## 2024-06-21 ASSESSMENT — ACTIVITIES OF DAILY LIVING (ADL)
OASIS_M1830: 05
HOME_HEALTH_OASIS: 01

## 2024-07-12 ENCOUNTER — DOCUMENTATION (OUTPATIENT)
Dept: HOME HEALTH SERVICES | Facility: HOME HEALTH | Age: 79
End: 2024-07-12
Payer: MEDICARE

## 2024-07-12 ENCOUNTER — HOME HEALTH ADMISSION (OUTPATIENT)
Dept: HOME HEALTH SERVICES | Facility: HOME HEALTH | Age: 79
End: 2024-07-12
Payer: MEDICARE

## 2024-07-18 ENCOUNTER — HOME CARE VISIT (OUTPATIENT)
Dept: HOME HEALTH SERVICES | Facility: HOME HEALTH | Age: 79
End: 2024-07-18
Payer: MEDICARE

## 2024-07-18 VITALS
DIASTOLIC BLOOD PRESSURE: 80 MMHG | OXYGEN SATURATION: 96 % | TEMPERATURE: 97 F | SYSTOLIC BLOOD PRESSURE: 120 MMHG | HEART RATE: 71 BPM

## 2024-07-18 PROCEDURE — 169592 NO-PAY CLAIM PROCEDURE

## 2024-07-18 PROCEDURE — G0152 HHCP-SERV OF OT,EA 15 MIN: HCPCS | Mod: HHH

## 2024-07-18 ASSESSMENT — ACTIVITIES OF DAILY LIVING (ADL)
USING_UTENSILS_SUPERVISION: 1
ORAL_CARE_MINIMUM_ASSIST: 1
ENTERING_EXITING_HOME: TOTAL DEPENDENCE
FEEDING ASSESSED: 1
DRINKING_FROM_CUP_SUPERVISION: 1
FEEDING: STAND BY ASSIST
OASIS_M1830: 06

## 2024-07-18 ASSESSMENT — ENCOUNTER SYMPTOMS
DENIES PAIN: 1
PERSON REPORTING PAIN: PATIENT

## 2024-07-19 ENCOUNTER — HOME CARE VISIT (OUTPATIENT)
Dept: HOME HEALTH SERVICES | Facility: HOME HEALTH | Age: 79
End: 2024-07-19
Payer: MEDICARE

## 2024-07-19 VITALS
HEART RATE: 75 BPM | DIASTOLIC BLOOD PRESSURE: 99 MMHG | RESPIRATION RATE: 14 BRPM | OXYGEN SATURATION: 95 % | SYSTOLIC BLOOD PRESSURE: 154 MMHG | TEMPERATURE: 97.3 F

## 2024-07-19 PROCEDURE — G0151 HHCP-SERV OF PT,EA 15 MIN: HCPCS | Mod: HHH

## 2024-07-19 SDOH — HEALTH STABILITY: PHYSICAL HEALTH: EXERCISE ACTIVITY: B LES, TRUNK

## 2024-07-19 SDOH — HEALTH STABILITY: PHYSICAL HEALTH: PHYSICAL EXERCISE: 5

## 2024-07-19 SDOH — HEALTH STABILITY: PHYSICAL HEALTH: PHYSICAL EXERCISE: SIT, RECLINED

## 2024-07-19 SDOH — HEALTH STABILITY: PHYSICAL HEALTH: EXERCISE ACTIVITIES SETS: 2

## 2024-07-19 ASSESSMENT — ENCOUNTER SYMPTOMS
LIMITED RANGE OF MOTION: 1
PAIN SEVERITY GOAL: 0/10
MUSCLE WEAKNESS: 1
HIGHEST PAIN SEVERITY IN PAST 24 HOURS: 3/10
PAIN LOCATION: GENERALIZED
LOWEST PAIN SEVERITY IN PAST 24 HOURS: 0/10
PAIN: 1
PERSON REPORTING PAIN: PATIENT
SUBJECTIVE PAIN PROGRESSION: WAXING AND WANING

## 2024-07-19 ASSESSMENT — ACTIVITIES OF DAILY LIVING (ADL)
CURRENT_FUNCTION: MAXIMUM ASSIST
PHYSICAL TRANSFERS ASSESSED: 1
AMBULATION_DISTANCE/DURATION_TOLERATED: NON AMBULATORY
AMBULATION ASSISTANCE: 1
CURRENT_FUNCTION: ONE PERSON

## 2024-07-20 NOTE — CASE COMMUNICATION
Scott Jerome,    Patient and Caregiver  Primary Reason for Home Care: Improve strength and mobility to make it easier for wife to care for the patient.  Skilled Needs: PT/OT/ST  Instruction provided: HEP, safety with bed mobility and transfers.  Patient and Caregiver response to instruction: Appreciative of our services.   Patient and Caregiver instructed on plan of care and visit frequency.   Patient and Caregiver in agreement with plan of  care and visit frequency.    Discipline Communication: PTA   Plan for next visit: We are looking for ways to help the wife improve the care and make easier her care for her . I recommended a lift chair, since he is having trouble sitting up at the edge of the bed independently. This would make it easier to stand and transfer as well.

## 2024-07-25 ENCOUNTER — HOME CARE VISIT (OUTPATIENT)
Dept: HOME HEALTH SERVICES | Facility: HOME HEALTH | Age: 79
End: 2024-07-25
Payer: MEDICARE

## 2024-07-25 ENCOUNTER — TELEPHONE (OUTPATIENT)
Dept: PRIMARY CARE | Facility: CLINIC | Age: 79
End: 2024-07-25
Payer: MEDICARE

## 2024-07-25 VITALS
RESPIRATION RATE: 14 BRPM | BODY MASS INDEX: 18.52 KG/M2 | OXYGEN SATURATION: 90 % | DIASTOLIC BLOOD PRESSURE: 30 MMHG | TEMPERATURE: 98.2 F | SYSTOLIC BLOOD PRESSURE: 66 MMHG | HEIGHT: 71 IN | HEART RATE: 53 BPM | WEIGHT: 132.28 LBS

## 2024-07-25 NOTE — TELEPHONE ENCOUNTER
Received call from ER nurse that patient  in ER. Requesting PCP Dr. MARTINO to complete death certificate. Per nurse, patient came in complaining of shortness of breath. Developed respiratory distress. Was intubated then coded. I stated that I could not speak for whether or not Dr. MARTINO would complete the death certificate but that I would forward her the information. It looks like patient has not been seen in our office since 2023.

## 2024-07-25 NOTE — ED TRIAGE NOTES
Pt found on the floor by wife, unresponsive and frothy secretions noted. Pt was in 60s O2 by squad on RA, placed on NRB on arrival, had been given 1 duoneb and 125 mg of IM solumedrol by EMS. Pt not responding on ED arrival, Dr Llanes called to bedside.

## 2024-07-25 NOTE — ED NOTES
Upon arrival to ED, pt was unresponsive upon arrival to ED, despite being on nonrebreather and field interventions by EMS. Dr Llanes began bag venting patient at 2148 until respiratory came to bedside. Unable to father pulse ox or blood pressure.  2154 Pt intubated with an 8 ET Tube with positive end tidal and color change.   2200 20g IV placed in LAC, 22g in R Hand   2156 100 mg of Ketamine given by Taryn JAIME RN   2201 Levophed started at 0.05 mcg/kg in LAC IV.  2210 Levophed increased to 0.06 mcg/kg for a blood pressure of 65/33.  2211 Levophed increased to 0.07 mcg/kg for a pressure of 50/26.  2213 18 Icelandic OG tube placed by Taryn JAIME RN.   2217 Atropine 1 mg given, patient also externally paced at 60 BPM.   2218 Pt pulse lost, CPR started, see code documentation.      Stella Nance RN  07/24/24 2218       Stella Nance RN  07/24/24 2308

## 2024-07-25 NOTE — ED PROVIDER NOTES
HPI   Chief Complaint   Patient presents with    Respiratory Distress     60% on RA at home, not responding per wife.        Patient presents emergency department for respiratory distress.  Patient with history of stroke greater than 15 years ago as well as a hip fracture with recent stenting to a nursing home.  Insurance ran out of the nursing home stay so family brought the patient home.  Since coming home the patient is lost a lot of weight and is continue to decline.  Patient has a hospital bed in the home as well.  This evening patient was given some dinner and approximately an hour later wife came back to find the patient in respiratory distress gasping for air.  EMS was called.  EMS reports patient had foaming secretions and was found to be hypoxic into 60%.  Patient placed on nonrebreather, given Solu-Medrol and DuoNebs and was brought here to the emergency department.              Patient History   Past Medical History:   Diagnosis Date    Personal history of other diseases of the digestive system     History of gastroesophageal reflux (GERD)    Personal history of other endocrine, nutritional and metabolic disease     History of hypercholesterolemia    Personal history of other mental and behavioral disorders     History of depression    Personal history of transient ischemic attack (TIA), and cerebral infarction without residual deficits     History of cerebrovascular accident     Past Surgical History:   Procedure Laterality Date    MR HEAD ANGIO WO IV CONTRAST  3/13/2023    MR HEAD ANGIO WO IV CONTRAST POR MRI    MR NECK ANGIO WO IV CONTRAST  6/7/2022    MR NECK ANGIO WO IV CONTRAST 6/7/2022 [facility]    MR NECK ANGIO WO IV CONTRAST  3/13/2023    MR NECK ANGIO WO IV CONTRAST POR MRI    OTHER SURGICAL HISTORY  04/13/2022    Inguinal hernia repair     Family History   Problem Relation Name Age of Onset    Heart attack Mother      Coronary artery disease Mother      Coronary artery disease  Brother      Heart attack Brother      Diabetes Other       Social History     Tobacco Use    Smoking status: Former     Current packs/day: 0.00     Types: Cigarettes     Quit date:      Years since quittin.5    Smokeless tobacco: Never   Substance Use Topics    Alcohol use: Never    Drug use: Never       Physical Exam   ED Triage Vitals   Temperature Heart Rate Respirations BP   24 --   36.8 °C (98.2 °F) 72 16       Pulse Ox Temp src Heart Rate Source Patient Position   24 -- 24 --   (!) 84 %  Monitor       BP Location FiO2 (%)     -- 24      100 %       Physical Exam  Vitals and nursing note reviewed.   Constitutional:       General: He is in acute distress.      Appearance: He is well-developed. He is toxic-appearing.   HENT:      Head: Normocephalic and atraumatic.      Right Ear: External ear normal.      Left Ear: External ear normal.      Mouth/Throat:      Comments: Foaming secretions at the mouth  Neck:      Trachea: Trachea normal.   Cardiovascular:      Rate and Rhythm: Normal rate.   Pulmonary:      Effort: Respiratory distress present.      Comments: Tachypnea, shallow breaths  Abdominal:      General: Bowel sounds are normal.      Palpations: Abdomen is soft.      Tenderness: There is no abdominal tenderness.   Musculoskeletal:         General: No swelling or tenderness.      Cervical back: No tenderness.   Skin:     General: Skin is warm and dry.      Capillary Refill: Capillary refill takes more than 3 seconds.   Psychiatric:         Thought Content: Thought content does not include homicidal or suicidal ideation.           ED Course & MDM   ED Course as of 24 06 Time of death []      ED Course User Index  [] Donnie Llanes MD         Diagnoses as of 24 0658   Cardiopulmonary arrest (Multi)   Acute pulmonary edema (Multi)                       Brittany Coma Scale Score: 3                         Medical Decision Making  Patient presents with respiratory distress. On initial assessment the patient was found toxic, acute respiratory failure, tachypneic and afebrile. Initial concern for flash pulm edema, MI, ACS, pneumonia, pneumothorax, aspiration.  On my arrival patient was on a nonrebreather.  Patient quickly transition to BVM and supported breathing.  Patient was emergently intubated.  Placement confirmed with direct visualization, color change as well as capnography.  Patient is found to be hypotensive.  Patient placed in Trendelenburg with slight movement of his blood pressure and patient started on Levophed.  While placing an NG tube and attempted to get a chest x-ray patient went into cardiac arrest.  CPR was started manually and quickly transitioned to Panda device.  Patient given multiple rounds of epinephrine, bicarb, calcium.  Initial rhythm was bradycardia PEA.  Patient did have an episode of V. tach on recheck and was defibrillated.  Patient had 2 runs of V. tach with defibrillation x 2.  Patient given 300 mg of amiodarone followed by another 150 mg amiodarone.  Family was brought back to witness resuscitation efforts.  They wish to cease resuscitation.  Once stopping resuscitation patient here to be in third-degree heart block.  I briefly paced the patient as well.  EKG was obtained without pacing and found to be in heart block.  After he started pacing patient does not have any pulses.  Confirmed with family not to work continue resuscitation.  Associations were stopped and time of death was called at 2248.    Amount and/or Complexity of Data Reviewed  ECG/medicine tests: independent interpretation performed.     Details: EKG obtained at 2156 interpreted by myself shows sinus rhythm, rate 66, QRS is 111, QTc is 401, ST segment depressions in V2 through V6  EKG obtained at 2240 [facility] shows sinus bradycardia with third-degree heart block, rate 36, , QTc 383, T wave  inversion in aVL, ST's elevation and 3 and depressions and V1 through V3        Procedure  Critical Care    Performed by: Donnie Llanes MD  Authorized by: Donnie Llanes MD    Critical care provider statement:     Critical care time (minutes):  30    Critical care was necessary to treat or prevent imminent or life-threatening deterioration of the following conditions:  Cardiac failure and circulatory failure    Critical care was time spent personally by me on the following activities:  Ordering and performing treatments and interventions, ordering and review of laboratory studies, pulse oximetry, re-evaluation of patient's condition, transcutaneous pacing, ventilator management, obtaining history from patient or surrogate, interpretation of cardiac output measurements, examination of patient, evaluation of patient's response to treatment and development of treatment plan with patient or surrogate  Intubation    Performed by: Donnie Llanes MD  Authorized by: Donnie Llanes MD    Consent:     Consent obtained:  Emergent situation  Pre-procedure details:     Indications: respiratory failure      Patient status:  Unresponsive    Look externally: facial hair      Mallampati score:  I    Obstruction: edema      Neck mobility: normal      Pharmacologic strategy: none      Induction agents:  None  Procedure details:     Preoxygenation:  Bag valve mask    CPR in progress: no      Number of attempts:  1  Successful intubation attempt details:     Intubation method:  Oral    Intubation technique: video assisted      Laryngoscope blade:  Hypercurved    Bougie used: no      Grade view: I      Tube size (mm):  8.0    Tube type:  Cuffed    Tube visualized through cords: yes    Placement assessment:     Tube secured with:  ETT gant    Breath sounds:  Equal    Placement verification: chest rise, colorimetric ETCO2, direct visualization and numeric ETCO2    Post-procedure details:     Procedure completion:  Tolerated  Comments:       Performed by Medic Student Ady Llanes MD  07/24/24 7860       Donnie Llanes MD  07/25/24 9141

## 2024-07-25 NOTE — ED NOTES
CPR and life saving measures stopped per family following explanation by Dr Llanes during final pulse check, despite pt having a faint femoral pulse and limited cardiac activity on Ultrasound, pt family agreed to not restart CPR/Life sustaining measures if pt were to lose pulse again. All staff in agreement with this, along with family. Code stopped, pt monitored until pulse stopped, confirmed with auscultation by Dr Llanes at 2248 with family present at bedside.      Stella Nance RN  07/25/24 005

## 2024-07-29 ENCOUNTER — TELEPHONE (OUTPATIENT)
Dept: PRIMARY CARE | Facility: CLINIC | Age: 79
End: 2024-07-29
Payer: MEDICARE

## 2024-07-29 NOTE — TELEPHONE ENCOUNTER
Buddy Cooper  home called asking if [facility] is willing to sign Death certificate?  Pt passed on  @10:48 at [facility].  Phone number is [phone]

## 2024-07-31 ENCOUNTER — TELEPHONE (OUTPATIENT)
Dept: PRIMARY CARE | Facility: CLINIC | Age: 79
End: 2024-07-31
Payer: MEDICARE

## (undated) DEVICE — SUTURE, VICRYL, 2-0, 27 IN, FS-1, UNDYED

## (undated) DEVICE — GLOVE, PROTEXIS PI CLASSIC, SZ-8.0, PF, PF, LF

## (undated) DEVICE — SUTURE, VICRYL, 0, 36 IN, CT-1, UNDYED

## (undated) DEVICE — KIT, COMPRESSION HIP, CUSTOM, PORTAGE

## (undated) DEVICE — COVER HANDLE LIGHT, STERIS, BLUE, STERILE

## (undated) DEVICE — SOLUTION, IRRIGATION, SODIUM CHLORIDE 0.9%, 1000 ML, POUR BOTTLE

## (undated) DEVICE — BIT, DRILL, CANNULATED, QUICK CONNECT, 5.0 X 200MM

## (undated) DEVICE — Device

## (undated) DEVICE — APPLICATOR, CHLORAPREP, W/ORANGE TINT, 26ML

## (undated) DEVICE — GLOVE, SURGICAL, PROTEXIS PI BLUE W/NEUTHERA, 8.0, PF, LF

## (undated) DEVICE — STAPLER, SKIN, MULTIFIRE, PREMIUM, WIDE, 35 W

## (undated) DEVICE — TOWEL PACK, STERILE, 4/PACK, BLUE

## (undated) DEVICE — SYRINGE, 30 CC, LUER LOCK

## (undated) DEVICE — IMPLANTABLE DEVICE: Type: IMPLANTABLE DEVICE | Site: HIP | Status: NON-FUNCTIONAL